# Patient Record
Sex: FEMALE | Race: WHITE | NOT HISPANIC OR LATINO | Employment: OTHER | URBAN - METROPOLITAN AREA
[De-identification: names, ages, dates, MRNs, and addresses within clinical notes are randomized per-mention and may not be internally consistent; named-entity substitution may affect disease eponyms.]

---

## 2017-02-23 ENCOUNTER — TELEPHONE (OUTPATIENT)
Dept: OPHTHALMOLOGY | Facility: CLINIC | Age: 62
End: 2017-02-23

## 2017-02-23 NOTE — TELEPHONE ENCOUNTER
----- Message from Roxanna Stacy sent at 2/23/2017  8:10 AM CST -----  Contact: Patient  Patient states she has conjunctivitis, and wants to know if she can use her contacts and if its time for her to make another appointment, please call her back at [phone]. Thank you

## 2017-03-21 ENCOUNTER — OFFICE VISIT (OUTPATIENT)
Dept: OPHTHALMOLOGY | Facility: CLINIC | Age: 62
End: 2017-03-21
Payer: COMMERCIAL

## 2017-03-21 DIAGNOSIS — H52.03 HYPEROPIA, BILATERAL: ICD-10-CM

## 2017-03-21 DIAGNOSIS — H10.89 GPC (GIANT PAPILLARY CONJUNCTIVITIS): Primary | ICD-10-CM

## 2017-03-21 DIAGNOSIS — Z46.0 ENCOUNTER FOR FITTING OR ADJUSTMENT OF SPECTACLES OR CONTACT LENSES: ICD-10-CM

## 2017-03-21 PROCEDURE — 99999 PR PBB SHADOW E&M-EST. PATIENT-LVL II: CPT | Mod: PBBFAC,,, | Performed by: OPTOMETRIST

## 2017-03-21 PROCEDURE — 92310 CONTACT LENS FITTING OU: CPT | Mod: S$GLB,,, | Performed by: OPTOMETRIST

## 2017-03-21 PROCEDURE — 92015 DETERMINE REFRACTIVE STATE: CPT | Mod: S$GLB,,, | Performed by: OPTOMETRIST

## 2017-03-21 PROCEDURE — 92014 COMPRE OPH EXAM EST PT 1/>: CPT | Mod: S$GLB,,, | Performed by: OPTOMETRIST

## 2017-03-21 RX ORDER — CETIRIZINE HYDROCHLORIDE 10 MG/1
10 TABLET ORAL
COMMUNITY
End: 2023-04-04

## 2017-03-21 RX ORDER — FLUOROMETHOLONE 1 MG/ML
1 SUSPENSION/ DROPS OPHTHALMIC 2 TIMES DAILY PRN
Qty: 10 ML | Refills: 3 | Status: SHIPPED | OUTPATIENT
Start: 2017-03-21 | End: 2017-03-22

## 2017-03-21 NOTE — PROGRESS NOTES
HPI     No visual complaints. Last eye exam 07/11/2014. Update glasses and   contact lenses RX.  No dilation today. Last dilation 02/21/2011. Discuss   fee to update contact lenses.       Last edited by Geneva Damico on 3/21/2017  8:19 AM.         Assessment /Plan     For exam results, see Encounter Report.    GPC (giant papillary conjunctivitis)  -     fluorometholone 0.1% (FML) 0.1 % DrpS; Place 1 drop into both eyes 2 (two) times daily as needed.  Dispense: 10 mL; Refill: 3    Encounter for fitting or adjustment of spectacles or contact lenses    Hyperopia, bilateral      Temporary switch to AV Oasys 1 Day OU  FML bid OU x 10 days then try Air Optix again to see if discomfort returns. May need to extend daily disposables for several months.    Discussed CL charges.  Dispense Final Rx for glasses  Note changes CL Rx  RTC 1 year

## 2017-03-22 RX ORDER — PREDNISOLONE SODIUM PHOSPHATE 10 MG/ML
1 SOLUTION/ DROPS OPHTHALMIC 2 TIMES DAILY
Qty: 10 ML | Refills: 0 | Status: SHIPPED | OUTPATIENT
Start: 2017-03-22 | End: 2017-04-01

## 2017-05-23 DIAGNOSIS — M79.671 RIGHT FOOT PAIN: Primary | ICD-10-CM

## 2017-05-24 ENCOUNTER — HOSPITAL ENCOUNTER (OUTPATIENT)
Dept: RADIOLOGY | Facility: HOSPITAL | Age: 62
Discharge: HOME | End: 2017-05-24
Attending: PODIATRIST
Payer: COMMERCIAL

## 2017-05-24 ENCOUNTER — OFFICE VISIT (OUTPATIENT)
Dept: PODIATRY | Facility: CLINIC | Age: 62
End: 2017-05-24
Payer: COMMERCIAL

## 2017-05-24 VITALS
HEIGHT: 60 IN | DIASTOLIC BLOOD PRESSURE: 70 MMHG | BODY MASS INDEX: 25.79 KG/M2 | HEART RATE: 69 BPM | WEIGHT: 131.38 LBS | SYSTOLIC BLOOD PRESSURE: 106 MMHG

## 2017-05-24 DIAGNOSIS — M19.079 ARTHRITIS, MIDFOOT: ICD-10-CM

## 2017-05-24 DIAGNOSIS — M79.671 RIGHT FOOT PAIN: ICD-10-CM

## 2017-05-24 DIAGNOSIS — S96.911A RIGHT FOOT STRAIN, INITIAL ENCOUNTER: Primary | ICD-10-CM

## 2017-05-24 PROCEDURE — 73630 X-RAY EXAM OF FOOT: CPT | Mod: TC,PO,RT

## 2017-05-24 PROCEDURE — 99214 OFFICE O/P EST MOD 30 MIN: CPT | Mod: S$GLB,,, | Performed by: PODIATRIST

## 2017-05-24 PROCEDURE — 99999 PR PBB SHADOW E&M-EST. PATIENT-LVL III: CPT | Mod: PBBFAC,,, | Performed by: PODIATRIST

## 2017-05-24 PROCEDURE — 1160F RVW MEDS BY RX/DR IN RCRD: CPT | Mod: S$GLB,,, | Performed by: PODIATRIST

## 2017-05-24 PROCEDURE — 73630 X-RAY EXAM OF FOOT: CPT | Mod: 26,RT,, | Performed by: RADIOLOGY

## 2017-05-24 RX ORDER — MELOXICAM 7.5 MG/1
TABLET ORAL
Qty: 30 TABLET | Refills: 0 | Status: SHIPPED | OUTPATIENT
Start: 2017-05-24 | End: 2017-08-24 | Stop reason: ALTCHOICE

## 2017-05-24 NOTE — PROGRESS NOTES
[facility] -   PODIATRIC MEDICINE AND SURGERY  PROGRESS NOTE  5/24/2017    PODIATRY NOTE  PCP: Dr. Humberto Colon MD    CHIEF COMPLAINT   Chief Complaint   Patient presents with    Foot Pain     right top of foot pain radiating to ankle worse when walking, xrays prior,        HPI  Jaylyn May is a 61 y.o. female who has a past medical history of Abnormal Pap smear and Supraventricular tachycardia.     Jaylyn presents to clinic today complaining of right foot pain .    Patient describes pain as:   Location: dorsum of right foot across midfoot   Quality: throbbing, sharp   Severity:6/10  Duration: 1 month   Modifying Factors (Aggravating): daily activities   Modifying Factors (Alleviating): no treatment    Pt states her pain has diminished over past week. Pain sudden onset over past 4 weeks. No trauma. No changes in shoewear. Pain is dull pain diffusely across midfoot which radiates proximally.     Patient denies other pedal complaints at this time.      PMH  Past Medical History:   Diagnosis Date    Abnormal Pap smear     over 15 years     Supraventricular tachycardia        PROBLEM LIST  Patient Active Problem List    Diagnosis Date Noted    Hyperlipidemia 09/20/2016    CFS (chronic fatigue syndrome) 09/16/2016    Anxiety state 05/25/2015    OP (osteoporosis) 05/25/2015    Paroxysmal supraventricular tachycardia 03/10/2015    Seasonal allergies 09/19/2013    Migraine without status migrainosus, not intractable 09/19/2013       MEDS  Current Outpatient Prescriptions on File Prior to Visit   Medication Sig Dispense Refill    aspirin (ECOTRIN) 81 MG EC tablet Take 81 mg by mouth once daily.      cetirizine (ZYRTEC) 10 MG tablet Take 10 mg by mouth.      GARLIC EXTRACT ORAL Take 1 capsule by mouth once daily.      lorazepam (ATIVAN) 1 MG tablet Take 0.5 mg by mouth every 6 (six) hours as needed for Anxiety.       melatonin 10 mg Tab Take 20 mg by mouth every evening.      metoprolol  succinate (TOPROL-XL) 25 MG 24 hr tablet Take 0.5 tablets (12.5 mg total) by mouth once daily. 45 tablet 3    MULTIVIT,THER IRON,CA,FA & MIN (MULTIVITAMIN) Tab Take 1 tablet by mouth once daily.      simvastatin (ZOCOR) 20 MG tablet Take 1 tablet by mouth once daily.      venlafaxine (EFFEXOR-XR) 75 MG 24 hr capsule Take 75 mg by mouth 2 (two) times daily.       pseudoephedrine (SUDAFED) 30 MG tablet Take 30 mg by mouth every 4 (four) hours as needed for Congestion.      rizatriptan (MAXALT) 10 MG tablet Take 10 mg by mouth as needed for Migraine.      [DISCONTINUED] tramadol (ULTRAM) 50 mg tablet Take 1 tablet (50 mg total) by mouth every 6 (six) hours as needed for Pain. 20 tablet 0     No current facility-administered medications on file prior to visit.        Medication List with Changes/Refills   New Medications    MELOXICAM (MOBIC) 7.5 MG TABLET    Take 1 pill by mouth daily for 14 days and then only take as needed for pain   Current Medications    ASPIRIN (ECOTRIN) 81 MG EC TABLET    Take 81 mg by mouth once daily.    CETIRIZINE (ZYRTEC) 10 MG TABLET    Take 10 mg by mouth.    GARLIC EXTRACT ORAL    Take 1 capsule by mouth once daily.    LORAZEPAM (ATIVAN) 1 MG TABLET    Take 0.5 mg by mouth every 6 (six) hours as needed for Anxiety.     MELATONIN 10 MG TAB    Take 20 mg by mouth every evening.    METOPROLOL SUCCINATE (TOPROL-XL) 25 MG 24 HR TABLET    Take 0.5 tablets (12.5 mg total) by mouth once daily.    MULTIVIT,THER IRON,CA,FA & MIN (MULTIVITAMIN) TAB    Take 1 tablet by mouth once daily.    PSEUDOEPHEDRINE (SUDAFED) 30 MG TABLET    Take 30 mg by mouth every 4 (four) hours as needed for Congestion.    RIZATRIPTAN (MAXALT) 10 MG TABLET    Take 10 mg by mouth as needed for Migraine.    SIMVASTATIN (ZOCOR) 20 MG TABLET    Take 1 tablet by mouth once daily.    VENLAFAXINE (EFFEXOR-XR) 75 MG 24 HR CAPSULE    Take 75 mg by mouth 2 (two) times daily.    Discontinued Medications    TRAMADOL (ULTRAM) 50 MG  TABLET    Take 1 tablet (50 mg total) by mouth every 6 (six) hours as needed for Pain.       PSH     Past Surgical History:   Procedure Laterality Date     SECTION      MANDIBLE FRACTURE SURGERY          ALL  Review of patient's allergies indicates:   Allergen Reactions    Penicillins      rash       SOC     Social History   Substance Use Topics    Smoking status: Never Smoker    Smokeless tobacco: Never Used    Alcohol use 4.2 oz/week     7 Glasses of wine per week         FAMILY HX    Family History   Problem Relation Age of Onset    Hypertension Mother     Cataracts Mother     Blindness Maternal Aunt     Hypertension Maternal Aunt     Cataracts Maternal Aunt     Cancer Maternal Grandmother     Leukemia Paternal Aunt             REVIEW OF SYSTEMS  General: Denies any fever or chills  Chest: Denies shortness of breath, wheezing, coughing, or sputum production  Heart: Denies chest pain, cold extremities, orthopenia, or reduced exercise tolerance  As noted above and per history of current illness above, otherwise negative in the remainder of the 14 systems.     PHYSICAL EXAM  Vitals:    17 0801   BP: 106/70   Pulse: 69   Weight: 59.6 kg (131 lb 6.3 oz)   Height: 5' (1.524 m)   PainSc:   6   PainLoc: Foot       General: This patient is well-developed, well-nourished and appears stated age, well-oriented to person, place and time, and cooperative and pleasant on today's visit      LOWER EXTREMITY  Vascular exam:   · Dorsalis pedis and posterior tibial pulses palpable 2/4 bilaterally.   · Capillary refill time immediate to the toes.   · Feet are warm to the touch. Skin temperature warm to warm from proximally to distally   · There are no varicosities, telangiectasias noted to bilateral foot and ankle regions.   · There are no ecchymoses noted to bilateral foot and ankle regions.   · There is no gross lower extremity edema.    Dermatologic exam:   · Skin moist with healthy texture and  turgor.  · There are no open ulcerations, lacerations, or fissures to bilateral foot and ankle regions. There are no signs of infection as there is no erythema, no proximal-extending lymphangiitis, no fluctuance, or crepitus noted on palpation to bilateral foot and ankle regions.   · There is no interdigital maceration.   · There are no hyperkeratotic lesions noted to feet. Nails are well-trimmed.    Neurologic exam:  · Epicritic sensation is intact as the patient is able to sense light touch to bilateral foot and ankle regions.   · Achilles and patellar deep tendon reflexes intact  · Babinski reflex absent  · There is no point tenderness with tuning fork across metatarsals 1-5 right    Musculoskeletal/Orthopedic exam:   · No symptomatic structural abnormalities noted  · Muscle strength AT/EHL/EDL/PT: 5/5; Achilles/Gastroc/Soleus: 5/5; PB/PL: 5/5 Muscle tone is normal.  · Ankle joint ROM  B/L supple DF/PF, non-crepitus  · STJ ROM supple inv/ev, non crepitus   · MTPJ b/l supple DF/PF, non crepitus    IMAGING   Reviewed by me and I agree with radiologist findings, 3 views of foot/ankle, reveal:  Results for orders placed during the hospital encounter of 06/10/14   X-Ray Foot Complete Bilateral    Narrative Findings: There is early degenerative change at both first MTP joints.  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 No periarticular erosions.  No acute fracture or dislocation.    Impression  As above.      Electronically signed by: NAGI GUAMAN MD  Date:     06/10/14  Time:    14:36            Results for orders placed during the hospital encounter of 06/10/14   X-Ray Foot Complete Bilateral    Narrative Findings: There is early degenerative change at both first MTP joints.  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 No periarticular erosions.  No acute fracture or dislocation.    Impression  As above.      Electronically signed by: NAGI GUAMAN  MD  Date:     06/10/14  Time:    14:36          ASSESSMENT  Right foot strain, initial encounter    Arthritis, midfoot    Other orders  -     meloxicam (MOBIC) 7.5 MG tablet; Take 1 pill by mouth daily for 14 days and then only take as needed for pain  Dispense: 30 tablet; Refill: 0        PLAN    1. Patient was educated about clinical and imaging findings, and verbalizes understanding of above.  2. Treatment plan: clinically foot symptoms have improved; pt should continue in corrective inserts and new balance tennis shoes; Rx mobic/PRICE therapy as needed if symptoms return   3. RTC  for follow up/evaluation as scheduled       Future Appointments  Date Time Provider Department Center   6/23/2017 3:00 PM Car Danielle MD [facility]       Report Electronically Signed By:  Geraldine Welch DPM   Podiatric Medicine & Surgery  [facility]  5/24/2017

## 2017-06-06 ENCOUNTER — CLINICAL SUPPORT (OUTPATIENT)
Dept: INTERNAL MEDICINE | Facility: CLINIC | Age: 62
End: 2017-06-06
Payer: COMMERCIAL

## 2017-06-06 DIAGNOSIS — Z23 NEED FOR ZOSTER VACCINE: Primary | ICD-10-CM

## 2017-06-06 PROCEDURE — 90471 IMMUNIZATION ADMIN: CPT | Mod: S$GLB,,, | Performed by: FAMILY MEDICINE

## 2017-06-06 PROCEDURE — 99999 PR PBB SHADOW E&M-EST. PATIENT-LVL II: CPT | Mod: PBBFAC,,,

## 2017-06-06 PROCEDURE — 90736 HZV VACCINE LIVE SUBQ: CPT | Mod: S$GLB,,, | Performed by: FAMILY MEDICINE

## 2017-07-06 ENCOUNTER — OFFICE VISIT (OUTPATIENT)
Dept: CARDIOLOGY | Facility: CLINIC | Age: 62
End: 2017-07-06
Payer: COMMERCIAL

## 2017-07-06 VITALS
HEIGHT: 60 IN | HEART RATE: 72 BPM | BODY MASS INDEX: 26.1 KG/M2 | DIASTOLIC BLOOD PRESSURE: 64 MMHG | SYSTOLIC BLOOD PRESSURE: 102 MMHG | WEIGHT: 132.94 LBS

## 2017-07-06 DIAGNOSIS — I47.10 PAROXYSMAL SUPRAVENTRICULAR TACHYCARDIA: Primary | ICD-10-CM

## 2017-07-06 DIAGNOSIS — E78.00 PURE HYPERCHOLESTEROLEMIA: ICD-10-CM

## 2017-07-06 PROCEDURE — 99214 OFFICE O/P EST MOD 30 MIN: CPT | Mod: S$GLB,,, | Performed by: NUCLEAR MEDICINE

## 2017-07-06 PROCEDURE — 99999 PR PBB SHADOW E&M-EST. PATIENT-LVL III: CPT | Mod: PBBFAC,,, | Performed by: NUCLEAR MEDICINE

## 2017-07-06 RX ORDER — DIPHENHYDRAMINE HCL 50 MG
50 CAPSULE ORAL
COMMUNITY
End: 2018-07-12

## 2017-07-06 NOTE — PROGRESS NOTES
Subjective:   Patient ID:  Jaylyn May is a 61 y.o. female who presents for follow-up of Tachycardia and Palpitations      HPI 1- PALPITATIONS- PSVT   2- DYSLIPIDEMIA  DOING WELL. HAS HAD ONLY  2-3 EPISODES, OF PALPITATIONS SINCE LAST VISIT. LASTING LESS THAN ONE MINUTE, RELATED TO EMOTIONAL STRESS. NO DURING PHYSICAL ACTIVITIES. CONTROLLED WITH VAGAL MANEUVERS.  NO HX OF NEAR SYNCOPE OR SYNCOPE  NO HX OF ANY TYPE OF CHEST DISCOMFORT. NO UNUSUAL CASTELLON. NO ORTHOPNEA OR PND  NO FOCAL CNS SYMPTOMS OR SIGNS TO SUGGEST TIA OR STROKE  NO EDEMA. NO INTERMITTENT CLAUDICATION  CARD MED WELL TOLERATED    Review of Systems   Constitution: Negative for chills, fever, weakness, night sweats, weight gain and weight loss.   HENT: Negative for headaches and nosebleeds.    Eyes: Negative for blurred vision, double vision and visual disturbance.   Cardiovascular: Negative for chest pain, dyspnea on exertion, irregular heartbeat, leg swelling, orthopnea, palpitations, paroxysmal nocturnal dyspnea and syncope.   Respiratory: Negative for cough, hemoptysis and wheezing.    Endocrine: Negative for polydipsia and polyuria.   Hematologic/Lymphatic: Does not bruise/bleed easily.   Skin: Negative for rash.   Musculoskeletal: Negative for joint pain, joint swelling, muscle weakness and myalgias.   Gastrointestinal: Negative for abdominal pain, hematemesis, jaundice and melena.   Genitourinary: Negative for dysuria, hematuria and nocturia.   Neurological: Negative for dizziness, focal weakness and sensory change.   Psychiatric/Behavioral: Negative for depression. The patient does not have insomnia and is not nervous/anxious.      Family History   Problem Relation Age of Onset    Hypertension Mother     Cataracts Mother     Blindness Maternal Aunt     Hypertension Maternal Aunt     Cataracts Maternal Aunt     Cancer Maternal Grandmother     Leukemia Paternal Aunt      Past Medical History:   Diagnosis Date    Abnormal Pap smear      over 15 years     Supraventricular tachycardia      Current Outpatient Prescriptions on File Prior to Visit   Medication Sig Dispense Refill    aspirin (ECOTRIN) 81 MG EC tablet Take 81 mg by mouth once daily.      GARLIC EXTRACT ORAL Take 1 capsule by mouth once daily.      melatonin 10 mg Tab Take 20 mg by mouth every evening.      metoprolol succinate (TOPROL-XL) 25 MG 24 hr tablet Take 0.5 tablets (12.5 mg total) by mouth once daily. 45 tablet 3    MULTIVIT,THER IRON,CA,FA & MIN (MULTIVITAMIN) Tab Take 1 tablet by mouth once daily.      simvastatin (ZOCOR) 20 MG tablet Take 1 tablet by mouth once daily.      venlafaxine (EFFEXOR-XR) 75 MG 24 hr capsule Take 75 mg by mouth 2 (two) times daily.       cetirizine (ZYRTEC) 10 MG tablet Take 10 mg by mouth.      lorazepam (ATIVAN) 1 MG tablet Take 0.5 mg by mouth every 6 (six) hours as needed for Anxiety.       meloxicam (MOBIC) 7.5 MG tablet Take 1 pill by mouth daily for 14 days and then only take as needed for pain 30 tablet 0    pseudoephedrine (SUDAFED) 30 MG tablet Take 30 mg by mouth every 4 (four) hours as needed for Congestion.      rizatriptan (MAXALT) 10 MG tablet Take 10 mg by mouth as needed for Migraine.       No current facility-administered medications on file prior to visit.      Review of patient's allergies indicates:   Allergen Reactions    Penicillins      rash       Objective:     Physical Exam   Constitutional: She is oriented to person, place, and time. She appears well-developed. No distress.   HENT:   Head: Normocephalic.   Eyes: Conjunctivae are normal. Pupils are equal, round, and reactive to light. No scleral icterus.   Neck: Normal range of motion. Neck supple. Normal carotid pulses, no hepatojugular reflux and no JVD present. Carotid bruit is not present. No edema present. No thyroid mass and no thyromegaly present.   Cardiovascular: Normal rate, regular rhythm, S1 normal, S2 normal, normal heart sounds and intact distal  pulses.  PMI is not displaced.  Exam reveals no gallop and no friction rub.    No murmur heard.  Pulses:       Carotid pulses are 2+ on the right side, and 2+ on the left side.       Radial pulses are 2+ on the right side, and 2+ on the left side.        Femoral pulses are 2+ on the right side, and 2+ on the left side.       Popliteal pulses are 2+ on the right side, and 2+ on the left side.        Dorsalis pedis pulses are 2+ on the right side, and 2+ on the left side.        Posterior tibial pulses are 2+ on the right side, and 2+ on the left side.   Pulmonary/Chest: Effort normal and breath sounds normal. She has no wheezes. She has no rales. She exhibits no tenderness.   Abdominal: Soft. Bowel sounds are normal. She exhibits no pulsatile midline mass and no mass. There is no hepatosplenomegaly. There is no tenderness.   Musculoskeletal: Normal range of motion. She exhibits no edema or tenderness.        Cervical back: Normal.        Thoracic back: Normal.        Lumbar back: Normal.   Lymphadenopathy:     She has no cervical adenopathy.     She has no axillary adenopathy.        Right: No supraclavicular adenopathy present.        Left: No supraclavicular adenopathy present.   Neurological: She is alert and oriented to person, place, and time. She has normal strength and normal reflexes. No sensory deficit. Gait normal.   Skin: Skin is warm. No rash noted. No cyanosis. No pallor. Nails show no clubbing.   Psychiatric: She has a normal mood and affect. Her speech is normal and behavior is normal. Cognition and memory are normal.       Assessment:     1. Paroxysmal supraventricular tachycardia    2. Pure hypercholesterolemia      CARD ARRHYTHMIAS WELL CONTROLLED  NO CLINICAL EVIDENCE OF ACTIVE MYOCARDIAL ISCHEMIA. NO ADHF  CNS STATUS STABLE  BP STABLE  CARD MED WELL TOLERATED  Plan:     Paroxysmal supraventricular tachycardia    Pure hypercholesterolemia    1- CONTINUE PRESENT CARD MANAGEMENT    2- RETURN IN ONE  YEAR

## 2017-07-24 ENCOUNTER — NURSE TRIAGE (OUTPATIENT)
Dept: ADMINISTRATIVE | Facility: CLINIC | Age: 62
End: 2017-07-24

## 2017-07-25 NOTE — TELEPHONE ENCOUNTER
Reason for Disposition   [1] Heart beating very rapidly (e.g., > 140 / minute) AND [2] not present now  (Exception: during exercise)    Answer Assessment - Initial Assessment Questions  Pt reports hx of SVT's. Tonight had sudden onset where she stated she could hear her heart and racing at 200. She was told she could take an extra 1/2 of a 25 mg metoprolol xl which she did. About 20 min later she decided to go to ER when sx's resolved. HR now < 100 and reported she feels fine. She is concerned about how long she should wait before going to ER if it occurs again and if there is anything else needed this pm.    Protocols used: ST HEART RATE AND HEARTBEAT [id number]

## 2017-07-25 NOTE — TELEPHONE ENCOUNTER
Caller: pt (Today,  9:29 AM)             She's calling in regards to a missed call, pt states she is doing fine today the issues from last night has been resolved, and thanks for calling,  [phone] ([phone])

## 2017-08-24 ENCOUNTER — OFFICE VISIT (OUTPATIENT)
Dept: INTERNAL MEDICINE | Facility: CLINIC | Age: 62
End: 2017-08-24
Payer: COMMERCIAL

## 2017-08-24 VITALS
BODY MASS INDEX: 25.84 KG/M2 | SYSTOLIC BLOOD PRESSURE: 116 MMHG | HEART RATE: 68 BPM | DIASTOLIC BLOOD PRESSURE: 64 MMHG | RESPIRATION RATE: 16 BRPM | OXYGEN SATURATION: 97 % | TEMPERATURE: 98 F | HEIGHT: 60 IN | WEIGHT: 131.63 LBS

## 2017-08-24 DIAGNOSIS — R05.3 COUGH, PERSISTENT: ICD-10-CM

## 2017-08-24 PROCEDURE — 3008F BODY MASS INDEX DOCD: CPT | Mod: S$GLB,,, | Performed by: PHYSICIAN ASSISTANT

## 2017-08-24 PROCEDURE — 99999 PR PBB SHADOW E&M-EST. PATIENT-LVL IV: CPT | Mod: PBBFAC,,, | Performed by: PHYSICIAN ASSISTANT

## 2017-08-24 PROCEDURE — 96372 THER/PROPH/DIAG INJ SC/IM: CPT | Mod: S$GLB,,, | Performed by: PHYSICIAN ASSISTANT

## 2017-08-24 PROCEDURE — 99213 OFFICE O/P EST LOW 20 MIN: CPT | Mod: 25,S$GLB,, | Performed by: PHYSICIAN ASSISTANT

## 2017-08-24 RX ORDER — BENZONATATE 200 MG/1
CAPSULE ORAL
Qty: 30 CAPSULE | Refills: 1 | Status: SHIPPED | OUTPATIENT
Start: 2017-08-24 | End: 2018-07-12

## 2017-08-24 RX ORDER — METHYLPREDNISOLONE ACETATE 80 MG/ML
80 INJECTION, SUSPENSION INTRA-ARTICULAR; INTRALESIONAL; INTRAMUSCULAR; SOFT TISSUE ONCE
Status: COMPLETED | OUTPATIENT
Start: 2017-08-24 | End: 2017-08-24

## 2017-08-24 RX ADMIN — METHYLPREDNISOLONE ACETATE 80 MG: 80 INJECTION, SUSPENSION INTRA-ARTICULAR; INTRALESIONAL; INTRAMUSCULAR; SOFT TISSUE at 04:08

## 2017-08-24 NOTE — PROGRESS NOTES
Subjective:       Patient ID: Jaylyn May is a 61 y.o.W/ female.    Chief Complaint: Cough    HPI         She comes in today by herself and has the above problem.  She started with a dry hacking cough about 5 days ago and it seems to be getting progressively worse.  It almost sounds a little on the croupy side like a seal barking.  She really has not been taking any OTC medicines.  She did take a few little cough drops.  She's had no problem with nose congestion, rhinorrhea, earache, fever, chills, rigors, sweats, diaphoresis, dyspnea of any modality, night sweats, CP, pleurisy or near syncope.  Her appetite is remain normal.    Review of Systems    Otherwise negative concerning the ENT, RESPIRATORY, PULMONARY, and CV system review.    Objective:      Physical Exam    ENT: All essentially WNL.  There is no redness inside the ears or the nose.  The pharynx and soft palate are normal in color without any swelling or exudates.  She has no halitosis.  Submandibular glands are nontender and there is no lymphadenopathy present.  CHEST: Clear BS anterior to posterior.  There is no wheeze, rhonchi, or rales.  She did have a rather disturbing dry sounding [place] cough here in the office that was somewhat hard to control.    Assessment:       1. Cough, persistent        Plan:     1.  She needs to go on Mucinex  mg every 12 hours to help loosen and promote drainage from her chest.  She also needs to take in 4 ounces of liquid at least 4 times a day to help the Mucinex do its job better.  2.  Also gave her Tessalon Perles to take daily at bedtime to suppress cough is necessary.  3.  A Depo-Medrol 80 mg IM injection was offered and she accepted that today.  She really does not need an antibiotic at this time unless he starts developing.  The mucus or fever.  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

## 2017-09-01 ENCOUNTER — TELEPHONE (OUTPATIENT)
Dept: INTERNAL MEDICINE | Facility: CLINIC | Age: 62
End: 2017-09-01

## 2017-09-01 NOTE — TELEPHONE ENCOUNTER
Pt c/o persistent productive cough, HA, fatigue, and chest discomfort from coughing. Pt denies fever, denies sore throat. Pt is requesting different prescription for cough syrup, stating syrup with codeine is NOT addressing symptoms.

## 2017-09-01 NOTE — TELEPHONE ENCOUNTER
Per Martin [place], [place] - pt has exhibited productive cough x 2 wks, exhibited failure on tessalon pearls and Cheratussin, recommends Tussionex syrup for effective cough suppression.

## 2017-10-23 DIAGNOSIS — I47.10 PSVT (PAROXYSMAL SUPRAVENTRICULAR TACHYCARDIA): ICD-10-CM

## 2017-10-23 RX ORDER — METOPROLOL SUCCINATE 25 MG/1
12.5 TABLET, EXTENDED RELEASE ORAL DAILY
Qty: 45 TABLET | Refills: 3 | Status: SHIPPED | OUTPATIENT
Start: 2017-10-23 | End: 2018-10-07 | Stop reason: SDUPTHER

## 2018-03-02 ENCOUNTER — HOSPITAL ENCOUNTER (OUTPATIENT)
Dept: RADIOLOGY | Facility: HOSPITAL | Age: 63
Discharge: HOME | End: 2018-03-02
Attending: FAMILY MEDICINE
Payer: COMMERCIAL

## 2018-03-02 VITALS — BODY MASS INDEX: 25.72 KG/M2 | HEIGHT: 60 IN | WEIGHT: 131 LBS

## 2018-03-02 DIAGNOSIS — Z12.31 VISIT FOR SCREENING MAMMOGRAM: ICD-10-CM

## 2018-03-02 PROCEDURE — 77063 BREAST TOMOSYNTHESIS BI: CPT | Mod: 26,,, | Performed by: RADIOLOGY

## 2018-03-02 PROCEDURE — 77067 SCR MAMMO BI INCL CAD: CPT | Mod: TC,PO

## 2018-03-02 PROCEDURE — 77067 SCR MAMMO BI INCL CAD: CPT | Mod: 26,,, | Performed by: RADIOLOGY

## 2018-03-06 ENCOUNTER — APPOINTMENT (OUTPATIENT)
Dept: RADIOLOGY | Facility: CLINIC | Age: 63
End: 2018-03-06
Attending: FAMILY MEDICINE
Payer: COMMERCIAL

## 2018-03-06 DIAGNOSIS — Z13.820 ENCOUNTER FOR SCREENING FOR OSTEOPOROSIS: ICD-10-CM

## 2018-03-06 DIAGNOSIS — Z12.31 VISIT FOR SCREENING MAMMOGRAM: ICD-10-CM

## 2018-03-06 PROCEDURE — 77080 DXA BONE DENSITY AXIAL: CPT | Mod: 26,,, | Performed by: RADIOLOGY

## 2018-03-06 PROCEDURE — 77080 DXA BONE DENSITY AXIAL: CPT | Mod: TC,PO

## 2018-03-07 ENCOUNTER — OFFICE VISIT (OUTPATIENT)
Dept: OBSTETRICS AND GYNECOLOGY | Facility: CLINIC | Age: 63
End: 2018-03-07
Payer: COMMERCIAL

## 2018-03-07 VITALS
DIASTOLIC BLOOD PRESSURE: 60 MMHG | SYSTOLIC BLOOD PRESSURE: 113 MMHG | HEIGHT: 60 IN | BODY MASS INDEX: 25.99 KG/M2 | WEIGHT: 132.38 LBS

## 2018-03-07 DIAGNOSIS — Z01.419 ENCOUNTER FOR GYNECOLOGICAL EXAMINATION WITHOUT ABNORMAL FINDING: Primary | ICD-10-CM

## 2018-03-07 PROCEDURE — 99999 PR PBB SHADOW E&M-EST. PATIENT-LVL IV: CPT | Mod: PBBFAC,,, | Performed by: NURSE PRACTITIONER

## 2018-03-07 PROCEDURE — 99386 PREV VISIT NEW AGE 40-64: CPT | Mod: SA,S$GLB,, | Performed by: NURSE PRACTITIONER

## 2018-03-07 PROCEDURE — 88175 CYTOPATH C/V AUTO FLUID REDO: CPT

## 2018-03-07 RX ORDER — IBANDRONATE SODIUM 150 MG/1
150 TABLET, FILM COATED ORAL
COMMUNITY
Start: 2013-08-07 | End: 2018-03-07

## 2018-03-07 NOTE — PROGRESS NOTES
CC: Well woman exam    Jaylyn May is a 62 y.o. female  presents for well woman exam.  LMP: No LMP recorded. Patient is postmenopausal..  No issues, problems, or complaints.      Past Medical History:   Diagnosis Date    Abnormal Pap smear     over 15 years     Supraventricular tachycardia      Past Surgical History:   Procedure Laterality Date     SECTION      MANDIBLE FRACTURE SURGERY       Social History     Social History    Marital status:      Spouse name: N/A    Number of children: N/A    Years of education: N/A     Occupational History    Not on file.     Social History Main Topics    Smoking status: Never Smoker    Smokeless tobacco: Never Used    Alcohol use 4.2 oz/week     7 Glasses of wine per week      Comment: socally     Drug use: No    Sexual activity: Yes     Partners: Male     Birth control/ protection: None     Other Topics Concern    Not on file     Social History Narrative    No narrative on file     Family History   Problem Relation Age of Onset    Hypertension Mother     Cataracts Mother     Blindness Maternal Aunt     Hypertension Maternal Aunt     Cataracts Maternal Aunt     Breast cancer Sister     Cancer Maternal Grandmother     Breast cancer Maternal Grandmother     Leukemia Paternal Aunt      OB History      Para Term  AB Living    2         2    SAB TAB Ectopic Multiple Live Births                       BP (!) 113/59   Ht 5' (1.524 m)   Wt 60.1 kg (132 lb 6.2 oz)   BMI 25.85 kg/m²       ROS:  GENERAL: Denies weight gain or weight loss. Feeling well overall.   SKIN: Denies rash or lesions.   HEAD: Denies head injury or headache.   NODES: Denies enlarged lymph nodes.   CHEST: Denies chest pain or shortness of breath.   CARDIOVASCULAR: Denies palpitations or left sided chest pain.   ABDOMEN: No abdominal pain, constipation, diarrhea, nausea, vomiting or rectal bleeding.   URINARY: No frequency, dysuria, hematuria, or burning  on urination.  REPRODUCTIVE: See HPI.   BREASTS: The patient performs breast self-examination and denies pain, lumps, or nipple discharge.   HEMATOLOGIC: No easy bruisability or excessive bleeding.   MUSCULOSKELETAL: Denies joint pain or swelling.   NEUROLOGIC: Denies syncope or weakness.   PSYCHIATRIC: Denies depression, anxiety or mood swings.    PHYSICAL EXAM:  APPEARANCE: Well nourished, well developed, in no acute distress.  AFFECT: WNL, alert and oriented x 3  SKIN: No acne or hirsutism  NECK: Neck symmetric without masses or thyromegaly  NODES: No inguinal, cervical, axillary, or femoral lymph node enlargement  CHEST: Good respiratory effect  ABDOMEN: Soft.  No tenderness or masses.  No hepatosplenomegaly.  No hernias.  BREASTS: Symmetrical, no skin changes or visible lesions.  No palpable masses, nipple discharge bilaterally.  PELVIC: Normal external genitalia without lesions.  Normal hair distribution.  Adequate perineal body, normal urethral meatus.  Vagina atrophic without lesions or discharge.  Cervix pink but stenotic, without lesions, discharge or tenderness.  No significant cystocele or rectocele.  Bimanual exam shows uterus to be normal size, regular, mobile and nontender.  Adnexa without masses or tenderness.    EXTREMITIES: No edema.  Physical Exam    1. Encounter for gynecological examination without abnormal finding  Liquid-based pap smear, screening    AND PLAN:    Patient was counseled today on A.C.S. Pap guidelines and recommendations for yearly pelvic exams, mammograms and monthly self breast exams; to see her PCP for other health maintenance.

## 2018-06-25 ENCOUNTER — TELEPHONE (OUTPATIENT)
Dept: PEDIATRICS | Facility: CLINIC | Age: 63
End: 2018-06-25

## 2018-07-03 ENCOUNTER — OFFICE VISIT (OUTPATIENT)
Dept: ENDOCRINOLOGY | Facility: CLINIC | Age: 63
End: 2018-07-03
Payer: COMMERCIAL

## 2018-07-03 ENCOUNTER — TELEPHONE (OUTPATIENT)
Dept: ENDOCRINOLOGY | Facility: CLINIC | Age: 63
End: 2018-07-03

## 2018-07-03 VITALS
SYSTOLIC BLOOD PRESSURE: 114 MMHG | HEIGHT: 60 IN | BODY MASS INDEX: 25.97 KG/M2 | WEIGHT: 132.25 LBS | HEART RATE: 76 BPM | DIASTOLIC BLOOD PRESSURE: 62 MMHG | TEMPERATURE: 98 F

## 2018-07-03 DIAGNOSIS — D35.2 PITUITARY MACROADENOMA WITH EXTRASELLAR EXTENSION: ICD-10-CM

## 2018-07-03 DIAGNOSIS — R93.0 MASS IN REGION OF SELLA TURCICA PRESENT ON MAGNETIC RESONANCE IMAGING: Primary | ICD-10-CM

## 2018-07-03 PROCEDURE — 99999 PR PBB SHADOW E&M-EST. PATIENT-LVL III: CPT | Mod: PBBFAC,,, | Performed by: INTERNAL MEDICINE

## 2018-07-03 PROCEDURE — 99244 OFF/OP CNSLTJ NEW/EST MOD 40: CPT | Mod: S$GLB,,, | Performed by: INTERNAL MEDICINE

## 2018-07-03 NOTE — PROGRESS NOTES
""This note will be shared with the patient"Subjective:       Patient ID: Jaylyn May is a 62 y.o. female.  Patient is new to me    Records were reviewed      Chief Complaint: Abnormal Lab (mass in sella turcica, mildly elevated prolactin)      Consultation requested by Dr. MELINDA Canseco Jr.    HPI  Patient was referred for evaluation for mass in the sella turcica as well as elevated prolactin level.  She was seen in the emergency room on June 15th after onset of excruciating headache.  She had a CT scan of her head done at [facility] on Petra 15, 2018 that showed an abnormality in the pituitary area and this revealed a 7 mm hyperdense mass in the expected area of the pituitary and she was told to follow up with her PCP for an MRI which she did have at the same hospital on June 21, 2018 a sellar/suprasellar mass that may relate to a proteinaceous Rathke's cleft cyst or potentially pituitary macroadenoma.  Cystic craniopharyngioma is also possible and  it mentioned that the lesion demonstrated mild mass effect on the optic chiasm.  It was hyperintense on T1 weighted imaging.  And it measures 7.2 x 1.48 x 0.97 mm    Patient brought a CD that had both the CT and MRI which I did review the images as well as the report of both tests.  She had been referred to a neurosurgeon at the [facility] Dr. Collins who has referred her to Ophthalmology for visual field testing and who has not recommended surgery but observation at this time pending endocrine evaluation    Patient does report several year history of migraine headaches, she denies double vision or blurry vision    I have reviewed the past medical, family and social history    Review of Systems   Constitutional: Negative for appetite change, fatigue, fever and unexpected weight change.   HENT: Negative for sore throat and trouble swallowing.    Eyes: Negative for visual disturbance.   Respiratory: Negative for shortness of breath and wheezing.  "   Cardiovascular: Negative for chest pain, palpitations and leg swelling.   Gastrointestinal: Negative for diarrhea, nausea and vomiting.   Endocrine: Negative for cold intolerance, heat intolerance, polydipsia, polyphagia and polyuria.   Genitourinary: Negative for difficulty urinating, dysuria and menstrual problem.   Musculoskeletal: Negative for arthralgias and joint swelling.   Skin: Negative for rash.   Neurological: Positive for headaches. Negative for dizziness, weakness and numbness.   Psychiatric/Behavioral: Negative for confusion, dysphoric mood and sleep disturbance.       Objective:      Physical Exam   Constitutional: She is oriented to person, place, and time. She appears well-developed and well-nourished. No distress.   HENT:   Head: Normocephalic and atraumatic.   Right Ear: External ear normal.   Left Ear: External ear normal.   Nose: Nose normal.   Mouth/Throat: Oropharynx is clear and moist. No oropharyngeal exudate.   Eyes: Conjunctivae and EOM are normal. Pupils are equal, round, and reactive to light. No scleral icterus.   Neck: No JVD present. No tracheal deviation present. No thyromegaly present.   Cardiovascular: Normal rate, regular rhythm, normal heart sounds and intact distal pulses.  Exam reveals no gallop and no friction rub.    No murmur heard.  Pulmonary/Chest: Effort normal and breath sounds normal. No respiratory distress. She has no wheezes. She has no rales.   Abdominal: Soft. Bowel sounds are normal. She exhibits no distension and no mass. There is no tenderness. There is no rebound and no guarding. No hernia.   Musculoskeletal: She exhibits no edema or deformity.   Lymphadenopathy:     She has no cervical adenopathy.   Neurological: She is alert and oriented to person, place, and time. She has normal reflexes. No cranial nerve deficit.   Skin: Skin is warm. No rash noted. No erythema.   Psychiatric: She has a normal mood and affect. Her behavior is normal.   Vitals reviewed.         Lab Review:   No visits with results within 6 Month(s) from this visit.   Latest known visit with results is:   Clinical Support on 03/10/2015   Component Date Value    EF 03/10/2015 60     Diastolic Dysfunction 03/10/2015 No     Aortic Valve Regurgitati* 03/10/2015 MILD     Est. PA Systolic Pressure 03/10/2015 27.21     Tricuspid Valve Regurgit* 03/10/2015 TRIVIAL TO MILD        June 27, 2018 prolactin 24.8 (H)  Assessment:     1. Mass in region of sella turcica present on magnetic resonance imaging  ACTH    Insulin-like growth factor    Alpha-Subunit Pituitary Tumor Marker    Growth hormone    T4, free    T3, free    TSH    Follicle stimulating hormone    Luteinizing hormone    Cortisol, 8AM    Cortisol, urine, free    Creatinine, urine, timed 24 Hours    Basic metabolic panel    CANCELED: TSH   2. Pituitary macroadenoma with extrasellar extension  ACTH    Insulin-like growth factor    Alpha-Subunit Pituitary Tumor Marker    Growth hormone    T4, free    T3, free    TSH    Follicle stimulating hormone    Luteinizing hormone    Cortisol, 8AM    Cortisol, urine, free    Creatinine, urine, timed 24 Hours    Basic metabolic panel    CANCELED: TSH        Patient has already seen the neurosurgeon at [facility] and has been referred to Ophthalmology for visual field testing and I will check pituitary hormonal panel with fasting blood work as written below and screen for Cushing's also with 24 urine cortisol and if these tests are  normal as neurosurgeon has stated to patient, observation would be warranted as the prolactin since it was only mildly elevated likely is from stalk compression  Plan:   Mass in region of sella turcica present on magnetic resonance imaging  -     ACTH; Future; Expected date: 07/03/2018  -     Insulin-like growth factor; Future; Expected date: 07/03/2018  -     Alpha-Subunit Pituitary Tumor Marker; Future; Expected date: 07/03/2018  -     Growth hormone; Future; Expected date:  07/03/2018  -     T4, free; Future; Expected date: 07/03/2018  -     T3, free; Future; Expected date: 07/03/2018  -     TSH; Future; Expected date: 07/03/2018  -     Follicle stimulating hormone; Future; Expected date: 07/03/2018  -     Luteinizing hormone; Future; Expected date: 07/03/2018  -     Cancel: TSH; Future; Expected date: 07/03/2018  -     Cortisol, 8AM; Future; Expected date: 07/03/2018  -     Cortisol, urine, free; Future  -     Creatinine, urine, timed 24 Hours; Future  -     Basic metabolic panel; Future; Expected date: 07/03/2018    Pituitary macroadenoma with extrasellar extension  -     ACTH; Future; Expected date: 07/03/2018  -     Insulin-like growth factor; Future; Expected date: 07/03/2018  -     Alpha-Subunit Pituitary Tumor Marker; Future; Expected date: 07/03/2018  -     Growth hormone; Future; Expected date: 07/03/2018  -     T4, free; Future; Expected date: 07/03/2018  -     T3, free; Future; Expected date: 07/03/2018  -     TSH; Future; Expected date: 07/03/2018  -     Follicle stimulating hormone; Future; Expected date: 07/03/2018  -     Luteinizing hormone; Future; Expected date: 07/03/2018  -     Cancel: TSH; Future; Expected date: 07/03/2018  -     Cortisol, 8AM; Future; Expected date: 07/03/2018  -     Cortisol, urine, free; Future  -     Creatinine, urine, timed 24 Hours; Future  -     Basic metabolic panel; Future; Expected date: 07/03/2018          No Follow-up on file.

## 2018-07-03 NOTE — LETTER
July 9, 2018      MELINDA Canseco Jr., MD  [address]  [place] [address]           [facility] - Endocrinology  [address]  [address]  [place] [address]  Phone: [phone]  Fax: [phone]          Patient: Jaylyn May   MR Number: [id number]   YOB: 1955   Date of Visit: 7/3/2018       Dear Dr. MELINDA Canseco Jr.:    Thank you for referring Jaylyn May to me for evaluation. Attached you will find relevant portions of my assessment and plan of care.    If you have questions, please do not hesitate to call me. I look forward to following Jaylyn May along with you.    Sincerely,    China Pat  CC:  No Recipients    If you would like to receive this communication electronically, please contact [email or web address] or [phone] to request more information on [facility] access.    For providers and/or their staff who would like to refer a patient to [facility], please contact us through our one-stop-shop provider referral line, [facility], at [phone].    If you feel you have received this communication in error or would no longer like to receive these types of communications, please e-mail [email or web address]

## 2018-07-03 NOTE — TELEPHONE ENCOUNTER
----- Message from Denia Wisdom sent at 7/3/2018 10:21 AM CDT -----  Contact: pt   Pt states that she need to talk to nurse regarding leaving her MRI and doctors notes at home cause she didn't think she would get a appt today.   [phone]

## 2018-07-09 ENCOUNTER — LAB VISIT (OUTPATIENT)
Dept: LAB | Facility: HOSPITAL | Age: 63
End: 2018-07-09
Attending: FAMILY MEDICINE
Payer: COMMERCIAL

## 2018-07-09 DIAGNOSIS — D35.2 PITUITARY MACROADENOMA WITH EXTRASELLAR EXTENSION: ICD-10-CM

## 2018-07-09 DIAGNOSIS — R93.0 MASS IN REGION OF SELLA TURCICA PRESENT ON MAGNETIC RESONANCE IMAGING: ICD-10-CM

## 2018-07-09 LAB
ANION GAP SERPL CALC-SCNC: 10 MMOL/L
BUN SERPL-MCNC: 14 MG/DL
CALCIUM SERPL-MCNC: 10 MG/DL
CHLORIDE SERPL-SCNC: 105 MMOL/L
CO2 SERPL-SCNC: 26 MMOL/L
CORTIS SERPL-MCNC: 11.4 UG/DL
CREAT SERPL-MCNC: 0.8 MG/DL
EST. GFR  (AFRICAN AMERICAN): >60 ML/MIN/1.73 M^2
EST. GFR  (NON AFRICAN AMERICAN): >60 ML/MIN/1.73 M^2
FSH SERPL-ACNC: 57.7 MIU/ML
GLUCOSE SERPL-MCNC: 63 MG/DL
LH SERPL-ACNC: 26.3 MIU/ML
POTASSIUM SERPL-SCNC: 4.3 MMOL/L
SODIUM SERPL-SCNC: 141 MMOL/L
T3FREE SERPL-MCNC: 2.9 PG/ML
T4 FREE SERPL-MCNC: 1.02 NG/DL
TSH SERPL DL<=0.005 MIU/L-ACNC: 1.5 UIU/ML

## 2018-07-09 PROCEDURE — 84443 ASSAY THYROID STIM HORMONE: CPT

## 2018-07-09 PROCEDURE — 82533 TOTAL CORTISOL: CPT

## 2018-07-09 PROCEDURE — 84481 FREE ASSAY (FT-3): CPT

## 2018-07-09 PROCEDURE — 84439 ASSAY OF FREE THYROXINE: CPT

## 2018-07-09 PROCEDURE — 83520 IMMUNOASSAY QUANT NOS NONAB: CPT

## 2018-07-09 PROCEDURE — 83003 ASSAY GROWTH HORMONE (HGH): CPT

## 2018-07-09 PROCEDURE — 82024 ASSAY OF ACTH: CPT

## 2018-07-09 PROCEDURE — 84305 ASSAY OF SOMATOMEDIN: CPT

## 2018-07-09 PROCEDURE — 83002 ASSAY OF GONADOTROPIN (LH): CPT

## 2018-07-09 PROCEDURE — 36415 COLL VENOUS BLD VENIPUNCTURE: CPT | Mod: PO

## 2018-07-09 PROCEDURE — 83001 ASSAY OF GONADOTROPIN (FSH): CPT

## 2018-07-09 PROCEDURE — 80048 BASIC METABOLIC PNL TOTAL CA: CPT

## 2018-07-10 LAB
ACTH PLAS-MCNC: 27 PG/ML
GH SERPL-MCNC: 6.8 NG/ML

## 2018-07-11 LAB — A-PGH SER-MCNC: 0.6 NG/ML

## 2018-07-12 ENCOUNTER — OFFICE VISIT (OUTPATIENT)
Dept: OPHTHALMOLOGY | Facility: CLINIC | Age: 63
End: 2018-07-12
Payer: COMMERCIAL

## 2018-07-12 DIAGNOSIS — H52.4 HYPEROPIA WITH PRESBYOPIA OF BOTH EYES: ICD-10-CM

## 2018-07-12 DIAGNOSIS — H52.03 HYPEROPIA WITH PRESBYOPIA OF BOTH EYES: ICD-10-CM

## 2018-07-12 DIAGNOSIS — D49.7 PITUITARY TUMOR: Primary | ICD-10-CM

## 2018-07-12 DIAGNOSIS — Z46.0 CONTACT LENS/GLASSES FITTING: Primary | ICD-10-CM

## 2018-07-12 PROCEDURE — 99999 PR PBB SHADOW E&M-EST. PATIENT-LVL II: CPT | Mod: PBBFAC,,, | Performed by: OPHTHALMOLOGY

## 2018-07-12 PROCEDURE — 99499 UNLISTED E&M SERVICE: CPT | Mod: S$GLB,,, | Performed by: OPTOMETRIST

## 2018-07-12 PROCEDURE — 92014 COMPRE OPH EXAM EST PT 1/>: CPT | Mod: S$GLB,,, | Performed by: OPHTHALMOLOGY

## 2018-07-12 PROCEDURE — 92083 EXTENDED VISUAL FIELD XM: CPT | Mod: S$GLB,,, | Performed by: OPHTHALMOLOGY

## 2018-07-12 NOTE — PROGRESS NOTES
HPI     Today I am updating contact lens prescription and refraction before Ms. May sees Dr. Paredes.  She was recently diagnosed with a small mass on   her pituitary and is here for dilation and VF.  H/O giant papillary conjunctivitis.  Patient returned to monthly lenses,   but giant papillary conjunctivitis symptoms continued on the right eye.    She discontinued that lens, which is her distance eye.  She is not averse   to returning to daily disposable lenses.    Last edited by Darby Martinez, OD on 7/12/2018  8:40 AM. (History)        ROS     Negative for: Psychiatric    Last edited by Darby Martinez, OD on 7/12/2018  8:40 AM. (History)        Assessment /Plan     For exam results, see Encounter Report.    Contact lens/glasses fitting    Hyperopia with presbyopia of both eyes      Stable refractive error.  Updated contact lens prescription in AV 1 Day.  Patient will continue with monovision..

## 2018-07-12 NOTE — LETTER
[facility] - Ophthalmology  [address]  [place] [address]  Phone: [phone]  Fax: [phone]   July 12, 2018    Sami Wilks MD  [address]  [address]  [facility]  [place] [address]    Patient: Jaylyn May   MR Number: [id number]   YOB: 1955   Date of Visit: 7/12/2018       Dear Dr. Wilks :    Thank you for referring Jaylyn May to me for evaluation. Here is my assessment and plan of care:     /   :  1. Pituitary tumor   Recently diagnosed as incidental finding on neuroimaging and has seen Dr Juarez (endo) and Dr Barakat (NeuroSx) and OK to observe at this point. There is no evidence of chiasmal compression by optic nerve evaluation and VF testing. RTC in 6 months to rpt NVF or prn.           RTC 6 months repeat HVF,GOCT          If you have questions, please do not hesitate to call me. I look forward to following Ms. Jaylyn May along with you.    Sincerely,        Guy Paredes MD       CC  Rose Juarez MD

## 2018-07-12 NOTE — PROGRESS NOTES
SUBJECTIVE:   Jaylyn May is a 62 y.o. female   Uncorrected distance visual acuity was 20/50 in the right eye and not recorded in the left eye. Corrected distance visual acuity was not recorded in the right eye and Near in the left eye. Uncorrected near visual acuity was Distance in the right eye and not recorded in the left eye. Corrected near visual acuity was not recorded in the right eye and J1 in the left eye.   No chief complaint on file.       HPI:  HPI       Patient states she was diagnosed with a pituitary tumor pressing on her   optic nerve approx. 3 weeks ago , patient has no visual symptoms , Dr. SONAM Martinez did a scl update today.      Pitiatary Tumor   SCL WEAR OS -READING SLC     Last edited by TRUONG Stock on 7/12/2018  9:09 AM. (History)        Assessment /Plan :  1. Pituitary tumor   Recently diagnosed as incidental finding on neuroimaging and has seen Dr Juarez (endo) and Dr Barakat (NeuroSx) and OK to observe at this point. There is no evidence of chiasmal compression but optic nerve evaluation and VF testing. RTC in 6 months to rpt NVF or prn.           RTC 6 months repeat HVF,GOCT

## 2018-07-13 ENCOUNTER — TELEPHONE (OUTPATIENT)
Dept: ENDOCRINOLOGY | Facility: CLINIC | Age: 63
End: 2018-07-13

## 2018-07-13 LAB
IGF-I SERPL-MCNC: 104 NG/ML (ref 35–201)
IGF-I Z-SCORE SERPL: 0.25 SD

## 2018-07-13 NOTE — TELEPHONE ENCOUNTER
----- Message from Brittaney Luciano sent at 7/13/2018  7:49 AM CDT -----  Contact: pt  States she calling regarding her lab results. States if you get her voice mail, it is fine to leave a message. Please call pt at [phone]. Thank you

## 2018-07-13 NOTE — TELEPHONE ENCOUNTER
Phoned patient back and patient was wanting her results of her recent blood work she did in the clinic this week. I explained that the office is closed every Friday but I will send out a message to the provider. Patient thanked me and the call ended well.

## 2018-07-13 NOTE — TELEPHONE ENCOUNTER
----- Message from Roxanna Rosales sent at 7/13/2018  2:14 PM CDT -----  Contact: Patient  Patient is returning a call, please jovana her back at [phone]. Thank you

## 2018-07-18 ENCOUNTER — TELEPHONE (OUTPATIENT)
Dept: INTERNAL MEDICINE | Facility: CLINIC | Age: 63
End: 2018-07-18

## 2018-07-18 ENCOUNTER — TELEPHONE (OUTPATIENT)
Dept: ENDOCRINOLOGY | Facility: CLINIC | Age: 63
End: 2018-07-18

## 2018-07-18 DIAGNOSIS — I47.10 PAROXYSMAL SVT (SUPRAVENTRICULAR TACHYCARDIA): Primary | ICD-10-CM

## 2018-07-18 NOTE — TELEPHONE ENCOUNTER
Please notify patient that all of her labs were normal regarding her pituitary, her blood sugar was low today she had her blood work though she was fasting that morning.  Please ask her if she had any symptoms such as lightheadedness or dizziness.  I recommend that she purchases a meter such as a [employer] meter which is the cheapest with its strips and monitor her sugars and if she has low sugars can evaluate further.    Otherwise can follow back with me in 6 months since it appears also that her she did not have any visual field defects on her German visual field exam

## 2018-07-18 NOTE — TELEPHONE ENCOUNTER
Pt would like to be called regarding the results of endo appt with Dr. Juarez. I advised pt I will give the message to staff to advise of results. She verbalized understanding.

## 2018-07-19 ENCOUNTER — TELEPHONE (OUTPATIENT)
Dept: ENDOCRINOLOGY | Facility: CLINIC | Age: 63
End: 2018-07-19

## 2018-07-19 NOTE — TELEPHONE ENCOUNTER
Pt advised the following, per MD orders: all of her labs were normal regarding her pituitary, her blood sugar was low today she had her blood work though she was fasting that morning.  Please ask her if she had any symptoms such as lightheadedness or dizziness.  I recommend that she purchases a meter such as a [employer] meter which is the cheapest with its strips and monitor her sugars and if she has low sugars can evaluate further.     Otherwise can follow back with me in 6 months since it appears also that her she did not have any visual field defects on her German visual field exam. Pt verbalized understanding. Call ended pleasantly.

## 2018-07-26 ENCOUNTER — OFFICE VISIT (OUTPATIENT)
Dept: CARDIOLOGY | Facility: CLINIC | Age: 63
End: 2018-07-26
Payer: COMMERCIAL

## 2018-07-26 ENCOUNTER — CLINICAL SUPPORT (OUTPATIENT)
Dept: CARDIOLOGY | Facility: CLINIC | Age: 63
End: 2018-07-26
Payer: COMMERCIAL

## 2018-07-26 VITALS
DIASTOLIC BLOOD PRESSURE: 62 MMHG | HEIGHT: 60 IN | WEIGHT: 130 LBS | HEART RATE: 76 BPM | SYSTOLIC BLOOD PRESSURE: 114 MMHG | BODY MASS INDEX: 25.52 KG/M2

## 2018-07-26 DIAGNOSIS — I47.10 PAROXYSMAL SUPRAVENTRICULAR TACHYCARDIA: Primary | ICD-10-CM

## 2018-07-26 DIAGNOSIS — I47.10 PAROXYSMAL SVT (SUPRAVENTRICULAR TACHYCARDIA): ICD-10-CM

## 2018-07-26 PROCEDURE — 99999 PR PBB SHADOW E&M-EST. PATIENT-LVL III: CPT | Mod: PBBFAC,,, | Performed by: NUCLEAR MEDICINE

## 2018-07-26 PROCEDURE — 99214 OFFICE O/P EST MOD 30 MIN: CPT | Mod: S$GLB,,, | Performed by: NUCLEAR MEDICINE

## 2018-07-26 PROCEDURE — 3008F BODY MASS INDEX DOCD: CPT | Mod: CPTII,S$GLB,, | Performed by: NUCLEAR MEDICINE

## 2018-07-26 PROCEDURE — 93000 ELECTROCARDIOGRAM COMPLETE: CPT | Mod: S$GLB,,, | Performed by: INTERNAL MEDICINE

## 2018-07-26 NOTE — PROGRESS NOTES
Subjective:   Patient ID:  Jaylyn May is a 62 y.o. female who presents for follow-up of Palpitations (PSVT)      HPI 1-PALPITATIONS- PSVT  NO RECURRENT PALPITATIONS. NO RECENT HOSPITALIZATIONS OR ED VISITS FOR EXACERBATION OF CARD ARRHYTHMIAS. OR CHEST PAIN  NO NEAR SYNCOPE OR SYNCOPE  NO UNUSUAL CASTELLON.NO ORTHOPNEA OR PND  NO EDEMA. NO CALVE TENDERNESS  NO FOCAL CNS SYMPTOMS OR SIGNS - RECENTLY DX WITH SMALL PITUITARY TUMOR.  BBS WELL TOLERATED    Review of Systems   Constitution: Negative for chills, fever, weakness, night sweats, weight gain and weight loss.   HENT: Negative for nosebleeds.    Eyes: Negative for blurred vision, double vision and visual disturbance.   Cardiovascular: Negative for chest pain, dyspnea on exertion, irregular heartbeat, leg swelling, orthopnea, palpitations, paroxysmal nocturnal dyspnea and syncope.   Respiratory: Negative for cough, hemoptysis and wheezing.    Endocrine: Negative for polydipsia and polyuria.   Hematologic/Lymphatic: Does not bruise/bleed easily.   Skin: Negative for rash.   Musculoskeletal: Negative for joint pain, joint swelling, muscle weakness and myalgias.   Gastrointestinal: Negative for abdominal pain, hematemesis, jaundice and melena.   Genitourinary: Negative for dysuria, hematuria and nocturia.   Neurological: Negative for dizziness, focal weakness, headaches and sensory change.   Psychiatric/Behavioral: Negative for depression. The patient does not have insomnia and is not nervous/anxious.      Family History   Problem Relation Age of Onset    Hypertension Mother     Cataracts Mother     Blindness Maternal Aunt     Hypertension Maternal Aunt     Cataracts Maternal Aunt     Breast cancer Sister     Cancer Maternal Grandmother     Breast cancer Maternal Grandmother     Leukemia Paternal Aunt      Past Medical History:   Diagnosis Date    Abnormal Pap smear     over 15 years     Pituitary tumor 06/2018    Supraventricular tachycardia       Current Outpatient Prescriptions on File Prior to Visit   Medication Sig Dispense Refill    aspirin (ECOTRIN) 81 MG EC tablet Take 81 mg by mouth once daily.      cetirizine (ZYRTEC) 10 MG tablet Take 10 mg by mouth as needed.       GARLIC EXTRACT ORAL Take 1 capsule by mouth once daily.      ibandronate (BONIVA) 150 mg tablet take one tablet by mouth every 30 days as directed 3 tablet 3    LORazepam (ATIVAN) 1 MG tablet Take one tablet by mouth every 6 (six) hours as needed. 30 tablet 2    melatonin 10 mg Tab Take 20 mg by mouth every evening.      metoprolol succinate (TOPROL-XL) 25 MG 24 hr tablet Take 0.5 tablets (12.5 mg total) by mouth once daily. 45 tablet 3    rizatriptan (MAXALT) 10 MG tablet Take 10 mg by mouth as needed for Migraine.      simvastatin (ZOCOR) 20 MG tablet Take 1 tablet by mouth once daily.      venlafaxine (EFFEXOR-XR) 75 MG 24 hr capsule Take 75 mg by mouth 2 (two) times daily.       [DISCONTINUED] MULTIVIT,THER IRON,CA,FA & MIN (MULTIVITAMIN) Tab Take 1 tablet by mouth once daily.       No current facility-administered medications on file prior to visit.      Review of patient's allergies indicates:   Allergen Reactions    Penicillins      rash       Objective:     Physical Exam   Constitutional: She is oriented to person, place, and time. She appears well-developed. No distress.   HENT:   Head: Normocephalic.   Eyes: Conjunctivae are normal. Pupils are equal, round, and reactive to light. No scleral icterus.   Neck: Normal range of motion. Neck supple. Normal carotid pulses, no hepatojugular reflux and no JVD present. Carotid bruit is not present. No edema present. No thyroid mass and no thyromegaly present.   Cardiovascular: Normal rate, regular rhythm, S1 normal, S2 normal, normal heart sounds and intact distal pulses.  PMI is not displaced.  Exam reveals no gallop and no friction rub.    No murmur heard.  Pulses:       Carotid pulses are 2+ on the right side, and 2+  on the left side.       Radial pulses are 2+ on the right side, and 2+ on the left side.        Femoral pulses are 2+ on the right side, and 2+ on the left side.       Popliteal pulses are 2+ on the right side, and 2+ on the left side.        Dorsalis pedis pulses are 2+ on the right side, and 2+ on the left side.        Posterior tibial pulses are 2+ on the right side, and 2+ on the left side.   Pulmonary/Chest: Effort normal and breath sounds normal. She has no wheezes. She has no rales. She exhibits no tenderness.   Abdominal: Soft. Bowel sounds are normal. She exhibits no pulsatile midline mass and no mass. There is no hepatosplenomegaly. There is no tenderness.   Musculoskeletal: Normal range of motion. She exhibits no edema or tenderness.        Cervical back: Normal.        Thoracic back: Normal.        Lumbar back: Normal.   Lymphadenopathy:     She has no cervical adenopathy.     She has no axillary adenopathy.        Right: No supraclavicular adenopathy present.        Left: No supraclavicular adenopathy present.   Neurological: She is alert and oriented to person, place, and time. She has normal strength and normal reflexes. No sensory deficit. Gait normal.   Skin: Skin is warm. No rash noted. No cyanosis. No pallor. Nails show no clubbing.   Psychiatric: She has a normal mood and affect. Her speech is normal and behavior is normal. Cognition and memory are normal.       Assessment:     1. Paroxysmal supraventricular tachycardia      STABLE CV STATUS  CARD ARRHYTHMIAS CONTROLLED- ECG TODAY- SR, CHRONIC NS ST T CHANGES  Plan:     Paroxysmal supraventricular tachycardia      1- CONTINUE PRESENT CARD MANAGEMENT    2- RETURN IN ONE YEAR

## 2018-10-07 DIAGNOSIS — I47.10 PSVT (PAROXYSMAL SUPRAVENTRICULAR TACHYCARDIA): ICD-10-CM

## 2018-10-07 RX ORDER — METOPROLOL SUCCINATE 25 MG/1
TABLET, EXTENDED RELEASE ORAL
Qty: 45 TABLET | Refills: 3 | Status: SHIPPED | OUTPATIENT
Start: 2018-10-07 | End: 2019-09-28 | Stop reason: SDUPTHER

## 2018-11-13 ENCOUNTER — IMMUNIZATION (OUTPATIENT)
Dept: INTERNAL MEDICINE | Facility: CLINIC | Age: 63
End: 2018-11-13
Payer: COMMERCIAL

## 2018-11-13 PROCEDURE — 90686 IIV4 VACC NO PRSV 0.5 ML IM: CPT | Mod: S$GLB,,, | Performed by: INTERNAL MEDICINE

## 2018-11-13 PROCEDURE — 90471 IMMUNIZATION ADMIN: CPT | Mod: S$GLB,,, | Performed by: INTERNAL MEDICINE

## 2019-01-28 ENCOUNTER — OFFICE VISIT (OUTPATIENT)
Dept: OPHTHALMOLOGY | Facility: CLINIC | Age: 64
End: 2019-01-28
Payer: COMMERCIAL

## 2019-01-28 DIAGNOSIS — D49.7 PITUITARY TUMOR: Primary | ICD-10-CM

## 2019-01-28 PROCEDURE — 92083 EXTENDED VISUAL FIELD XM: CPT | Mod: S$GLB,,, | Performed by: OPHTHALMOLOGY

## 2019-01-28 PROCEDURE — 92133 CPTRZD OPH DX IMG PST SGM ON: CPT | Mod: S$GLB,,, | Performed by: OPHTHALMOLOGY

## 2019-01-28 PROCEDURE — 99999 PR PBB SHADOW E&M-EST. PATIENT-LVL II: CPT | Mod: PBBFAC,,, | Performed by: OPHTHALMOLOGY

## 2019-01-28 PROCEDURE — 92133 POSTERIOR SEGMENT OCT OPTIC NERVE(OCULAR COHERENCE TOMOGRAPHY) - OU - BOTH EYES: ICD-10-PCS | Mod: S$GLB,,, | Performed by: OPHTHALMOLOGY

## 2019-01-28 PROCEDURE — 92083 HUMPHREY VISUAL FIELD - OU - BOTH EYES: ICD-10-PCS | Mod: S$GLB,,, | Performed by: OPHTHALMOLOGY

## 2019-01-28 PROCEDURE — 92012 INTRM OPH EXAM EST PATIENT: CPT | Mod: S$GLB,,, | Performed by: OPHTHALMOLOGY

## 2019-01-28 PROCEDURE — 92012 PR EYE EXAM, EST PATIENT,INTERMED: ICD-10-PCS | Mod: S$GLB,,, | Performed by: OPHTHALMOLOGY

## 2019-01-28 PROCEDURE — 99999 PR PBB SHADOW E&M-EST. PATIENT-LVL II: ICD-10-PCS | Mod: PBBFAC,,, | Performed by: OPHTHALMOLOGY

## 2019-01-28 NOTE — PROGRESS NOTES
SUBJECTIVE:   Jaylyn May is a 63 y.o. female   Uncorrected distance visual acuity was 20/30 in the right eye and 20/30 -1 in the left eye.   Chief Complaint   Patient presents with    Follow-up     HVF gOCT, pituitary tumor follow up        HPI:  HPI     Follow-up      Additional comments: HVF gOCT, pituitary tumor follow up              Comments     1. Pituitary Tumor (incidental finding on neuroimaging 6/18)  Dr Wilks- Neurosurgery -OK to observe  Dr Juarez- Endo  2. SCL WEAR OS -READING SLC          Last edited by Arpita Edmondson MA on 1/28/2019  9:07 AM. (History)        Assessment /Plan :  1. Pituitary tumor   Had repeat neuroimaging in December with no interval growth. Her VF testing today is stable as well and no ocular findings to suggest progression.       RTC in 6 months for dilation and neuro VF

## 2019-01-28 NOTE — LETTER
[facility] - Ophthalmology  [address]  [place] [address]  Phone: [phone]  Fax: [phone]   January 28, 2019    Sami Wilks MD  [address]  [address]  [place] [address]    Patient: Jaylyn May   MR Number: [id number]   YOB: 1955   Date of Visit: 1/28/2019       Dear Dr. Wilks:    Thank you for referring Jaylyn May to me for evaluation. Here is my assessment and plan of care:    Assessment:   /    Plan:   :  1. Pituitary tumor   Had repeat neuroimaging in December with no interval growth. Her VF testing today is stable as well and no ocular findings to suggest progression.       RTC in 6 months for dilation and neuro VF              Below you will find my full exam findings. If you have questions, please do not hesitate to call me. I look forward to following Ms. Jaylyn May along with you.    Sincerely,        Guy Paredes MD       CC  Rose Juarez MD             Base Eye Exam     Visual Acuity (Snellen - Linear)       Right Left    Dist sc 20/30 20/30 -1          Tonometry (Applanation, 9:23 AM)       Right Left    Pressure 11 11          Neuro/Psych     Oriented x3:  Yes    Mood/Affect:  Normal            Slit Lamp and Fundus Exam     External Exam       Right Left    External Normal Normal          Slit Lamp Exam       Right Left    Lids/Lashes Normal Normal    Conjunctiva/Sclera White and quiet White and quiet    Cornea Clear Clear    Anterior Chamber moderate shallowing moderate shallowing    Iris Round and reactive Round and reactive    Lens Clear Clear    Vitreous Normal Normal          Fundus Exam       Right Left    Disc no pallor or edema no pallor or edema    C/D Ratio Vertical 0.3 0.25    Macula Normal Normal    Vessels Normal Normal

## 2019-02-06 ENCOUNTER — OFFICE VISIT (OUTPATIENT)
Dept: ENDOCRINOLOGY | Facility: CLINIC | Age: 64
End: 2019-02-06
Payer: COMMERCIAL

## 2019-02-06 ENCOUNTER — LAB VISIT (OUTPATIENT)
Dept: LAB | Facility: HOSPITAL | Age: 64
End: 2019-02-06
Attending: INTERNAL MEDICINE
Payer: COMMERCIAL

## 2019-02-06 VITALS
HEIGHT: 60 IN | BODY MASS INDEX: 25.58 KG/M2 | SYSTOLIC BLOOD PRESSURE: 110 MMHG | DIASTOLIC BLOOD PRESSURE: 66 MMHG | HEART RATE: 90 BPM | TEMPERATURE: 98 F | WEIGHT: 130.31 LBS

## 2019-02-06 DIAGNOSIS — R93.0 MASS IN REGION OF SELLA TURCICA PRESENT ON MAGNETIC RESONANCE IMAGING: Primary | ICD-10-CM

## 2019-02-06 DIAGNOSIS — R93.0 MASS IN REGION OF SELLA TURCICA PRESENT ON MAGNETIC RESONANCE IMAGING: ICD-10-CM

## 2019-02-06 LAB
ANION GAP SERPL CALC-SCNC: 7 MMOL/L
BUN SERPL-MCNC: 12 MG/DL
CALCIUM SERPL-MCNC: 10 MG/DL
CHLORIDE SERPL-SCNC: 103 MMOL/L
CO2 SERPL-SCNC: 29 MMOL/L
CREAT SERPL-MCNC: 0.7 MG/DL
EST. GFR  (AFRICAN AMERICAN): >60 ML/MIN/1.73 M^2
EST. GFR  (NON AFRICAN AMERICAN): >60 ML/MIN/1.73 M^2
GLUCOSE SERPL-MCNC: 97 MG/DL
POTASSIUM SERPL-SCNC: 4.6 MMOL/L
PROLACTIN SERPL IA-MCNC: 7.5 NG/ML
SODIUM SERPL-SCNC: 139 MMOL/L
T3FREE SERPL-MCNC: 2.4 PG/ML
T4 FREE SERPL-MCNC: 0.96 NG/DL
TSH SERPL DL<=0.005 MIU/L-ACNC: 1.22 UIU/ML

## 2019-02-06 PROCEDURE — 84439 ASSAY OF FREE THYROXINE: CPT

## 2019-02-06 PROCEDURE — 99999 PR PBB SHADOW E&M-EST. PATIENT-LVL III: CPT | Mod: PBBFAC,,, | Performed by: INTERNAL MEDICINE

## 2019-02-06 PROCEDURE — 3008F PR BODY MASS INDEX (BMI) DOCUMENTED: ICD-10-PCS | Mod: CPTII,S$GLB,, | Performed by: INTERNAL MEDICINE

## 2019-02-06 PROCEDURE — 84481 FREE ASSAY (FT-3): CPT

## 2019-02-06 PROCEDURE — 99213 PR OFFICE/OUTPT VISIT, EST, LEVL III, 20-29 MIN: ICD-10-PCS | Mod: S$GLB,,, | Performed by: INTERNAL MEDICINE

## 2019-02-06 PROCEDURE — 36415 COLL VENOUS BLD VENIPUNCTURE: CPT

## 2019-02-06 PROCEDURE — 3008F BODY MASS INDEX DOCD: CPT | Mod: CPTII,S$GLB,, | Performed by: INTERNAL MEDICINE

## 2019-02-06 PROCEDURE — 84443 ASSAY THYROID STIM HORMONE: CPT

## 2019-02-06 PROCEDURE — 99999 PR PBB SHADOW E&M-EST. PATIENT-LVL III: ICD-10-PCS | Mod: PBBFAC,,, | Performed by: INTERNAL MEDICINE

## 2019-02-06 PROCEDURE — 80048 BASIC METABOLIC PNL TOTAL CA: CPT

## 2019-02-06 PROCEDURE — 84146 ASSAY OF PROLACTIN: CPT

## 2019-02-06 PROCEDURE — 99213 OFFICE O/P EST LOW 20 MIN: CPT | Mod: S$GLB,,, | Performed by: INTERNAL MEDICINE

## 2019-02-06 NOTE — PROGRESS NOTES
Patient ID: Jaylyn May is a 63 y.o. female.  Patient is here for follow up        Chief Complaint: Hyperprolactinemia      HPI   Consultation requested by Dr. MELINDA Canseco Jr.    HPI  Patient was referred for evaluation for mass in the sella turcica as well as elevated prolactin level.  She was seen in the emergency room on June 15th after onset of excruciating headache.  She had a CT scan of her head done at [facility] on Petra 15, 2018 that showed an abnormality in the pituitary area and this revealed a 7 mm hyperdense mass in the expected area of the pituitary and she was told to follow up with her PCP for an MRI which she did have at the same hospital on June 21, 2018 a sellar/suprasellar mass that may relate to a proteinaceous Rathke's cleft cyst or potentially pituitary macroadenoma.  Cystic craniopharyngioma is also possible and  it mentioned that the lesion demonstrated mild mass effect on the optic chiasm.  It was hyperintense on T1 weighted imaging.  And it measures 7.2 x 1.48 x 0.97 mm    Patient brought a CD that had both the CT and MRI which I did review the images as well as the report of both tests.  She had been referred to a neurosurgeon at the neuro- medical Dr. Collins who  referred her to Ophthalmology for visual field testing and who has not recommended surgery but observation      Patient does report several year history of migraine headaches which have not worsened, she denies double vision or blurry vision    She recently had a no other visual field test [facility] Dr. Paredes which was normal and no evidence bitemporal hemianopsia    I have reviewed the past medical, family and social history    Review of Systems   Constitutional: Negative for appetite change, fatigue, fever and unexpected weight change.   HENT: Negative for sore throat and trouble swallowing.    Eyes: Negative for visual disturbance.   Respiratory: Negative for shortness of breath and wheezing.     Cardiovascular: Negative for chest pain, palpitations and leg swelling.   Gastrointestinal: Negative for diarrhea, nausea and vomiting.   Endocrine: Negative for cold intolerance, heat intolerance, polydipsia, polyphagia and polyuria.   Genitourinary: Negative for difficulty urinating, dysuria and menstrual problem.   Musculoskeletal: Negative for arthralgias and joint swelling.   Skin: Negative for rash.   Neurological: Negative for dizziness, weakness, numbness and headaches.   Psychiatric/Behavioral: Negative for confusion, dysphoric mood and sleep disturbance.       Objective:      Physical Exam   Constitutional: She appears well-developed and well-nourished. No distress.   HENT:   Head: Normocephalic and atraumatic.   Eyes: Conjunctivae and EOM are normal. Pupils are equal, round, and reactive to light. No scleral icterus.   Neck: No JVD present. No tracheal deviation present. No thyromegaly present.   Cardiovascular: Normal rate, regular rhythm, normal heart sounds and intact distal pulses. Exam reveals no gallop and no friction rub.   No murmur heard.  Pulmonary/Chest: Effort normal and breath sounds normal. No stridor. No respiratory distress. She has no wheezes. She has no rales. She exhibits no tenderness.   Musculoskeletal: She exhibits no edema or deformity.   Lymphadenopathy:     She has no cervical adenopathy.   Neurological: She is alert.   Skin: Skin is warm and dry. She is not diaphoretic.   Psychiatric: She has a normal mood and affect. Her behavior is normal.   Vitals reviewed.        Lab Review:   No visits with results within 6 Month(s) from this visit.   Latest known visit with results is:   Lab Visit on 07/09/2018   Component Date Value    Cortisol, 24H Ur 07/09/2018 12     Cortisol Clt Tm 07/09/2018 24     Urine Volume Cortisol 07/09/2018 2125     Urine Volume 07/09/2018 2125     Urine Collection Duration 07/09/2018 24     Urine, Creatinine 07/09/2018 38.0     Creatinine, Timed Urine  07/09/2018 33.6*    Creatinine, Ur (mg/spec) 07/09/2018 807.5        Assessment:     1. Mass in region of sella turcica present on magnetic resonance imaging  Prolactin    T4, free    T3, free    TSH    Basic metabolic panel    patient has had no growth per report from her neurosurgery in however she had an MRA just to ensure that there was no aneurysm.  She has a follow-up with her neurosurgeon next week.  Currently asymptomatic.  The mild elevation of prolactin is likely from stalk compression but I will repeat labs below to ensure stability    I will request records from her neurosurgeon  Plan:   Mass in region of sella turcica present on magnetic resonance imaging  -     Prolactin; Future; Expected date: 02/06/2019  -     T4, free; Future; Expected date: 02/06/2019  -     T3, free; Future; Expected date: 02/06/2019  -     TSH; Future; Expected date: 02/06/2019  -     Basic metabolic panel; Future; Expected date: 02/06/2019          Follow-up in about 6 months (around 8/6/2019).    Labs prior to appointment? not applicable     Disclaimer:  This note may have been partially prepared using voice recognition software and  it may have not been extensively proofed, as such there could be errors within the text such as sound alike errors.

## 2019-02-07 ENCOUNTER — TELEPHONE (OUTPATIENT)
Dept: ENDOCRINOLOGY | Facility: CLINIC | Age: 64
End: 2019-02-07

## 2019-02-07 NOTE — TELEPHONE ENCOUNTER
Spoke with pt, gave lab results and reminded pt to call the help line to assist with signing up on the pt portal

## 2019-02-07 NOTE — TELEPHONE ENCOUNTER
----- Message from Rose Juarez MD sent at 2/7/2019  8:32 AM CST -----  Please let patient know her results actually came back and all of them were normal including her prolactin so I will see her in 6 months

## 2019-07-19 ENCOUNTER — TELEPHONE (OUTPATIENT)
Dept: OPHTHALMOLOGY | Facility: CLINIC | Age: 64
End: 2019-07-19

## 2019-07-19 NOTE — TELEPHONE ENCOUNTER
----- Message from Denia Wisdom sent at 7/19/2019 10:35 AM CDT -----  Contact: pt   Call pt in regards to getting her contact Rx fax to another location to get contacts that her insurance will cover.   [phone]

## 2019-07-19 NOTE — TELEPHONE ENCOUNTER
Called pt and rescheduled her contact lens appt with [facility] to Monday so she can renew her contact lens Rx.

## 2019-07-22 ENCOUNTER — OFFICE VISIT (OUTPATIENT)
Dept: OPHTHALMOLOGY | Facility: CLINIC | Age: 64
End: 2019-07-22
Payer: COMMERCIAL

## 2019-07-22 DIAGNOSIS — Z46.0 CONTACT LENS/GLASSES FITTING: Primary | ICD-10-CM

## 2019-07-22 PROCEDURE — 99999 PR PBB SHADOW E&M-EST. PATIENT-LVL II: CPT | Mod: PBBFAC,,, | Performed by: OPTOMETRIST

## 2019-07-22 PROCEDURE — 92310 CONTACT LENS FITTING OU: CPT | Mod: CSM,,, | Performed by: OPTOMETRIST

## 2019-07-22 PROCEDURE — 99499 UNLISTED E&M SERVICE: CPT | Mod: S$GLB,,, | Performed by: OPTOMETRIST

## 2019-07-22 PROCEDURE — 92310 PR CONTACT LENS FITTING (LOW COMPLEXITY): ICD-10-PCS | Mod: CSM,,, | Performed by: OPTOMETRIST

## 2019-07-22 PROCEDURE — 99499 NO LOS: ICD-10-PCS | Mod: S$GLB,,, | Performed by: OPTOMETRIST

## 2019-07-22 PROCEDURE — 99999 PR PBB SHADOW E&M-EST. PATIENT-LVL II: ICD-10-PCS | Mod: PBBFAC,,, | Performed by: OPTOMETRIST

## 2019-07-22 NOTE — PROGRESS NOTES
HPI     Last S[facility] exam 01/28/2019  Pituitary tumor   Screening for glaucoma  RE  Update CL Rx     Last edited by Jules Wisdom MA on 7/22/2019  2:29 PM. (History)            Assessment /Plan     For exam results, see Encounter Report.    Contact lens/glasses fitting      Good fit and VA with current daily SCL.  Rx given.

## 2019-08-07 DIAGNOSIS — I47.10 PSVT (PAROXYSMAL SUPRAVENTRICULAR TACHYCARDIA): Primary | ICD-10-CM

## 2019-08-08 ENCOUNTER — OFFICE VISIT (OUTPATIENT)
Dept: OPHTHALMOLOGY | Facility: CLINIC | Age: 64
End: 2019-08-08
Payer: COMMERCIAL

## 2019-08-08 ENCOUNTER — LAB VISIT (OUTPATIENT)
Dept: LAB | Facility: HOSPITAL | Age: 64
End: 2019-08-08
Attending: INTERNAL MEDICINE
Payer: COMMERCIAL

## 2019-08-08 ENCOUNTER — OFFICE VISIT (OUTPATIENT)
Dept: ENDOCRINOLOGY | Facility: CLINIC | Age: 64
End: 2019-08-08
Payer: COMMERCIAL

## 2019-08-08 ENCOUNTER — CLINICAL SUPPORT (OUTPATIENT)
Dept: CARDIOLOGY | Facility: CLINIC | Age: 64
End: 2019-08-08
Payer: COMMERCIAL

## 2019-08-08 ENCOUNTER — OFFICE VISIT (OUTPATIENT)
Dept: CARDIOLOGY | Facility: CLINIC | Age: 64
End: 2019-08-08
Payer: COMMERCIAL

## 2019-08-08 VITALS
BODY MASS INDEX: 25.5 KG/M2 | SYSTOLIC BLOOD PRESSURE: 120 MMHG | HEART RATE: 69 BPM | DIASTOLIC BLOOD PRESSURE: 60 MMHG | HEIGHT: 60 IN | WEIGHT: 129.88 LBS

## 2019-08-08 VITALS
HEIGHT: 60 IN | TEMPERATURE: 98 F | WEIGHT: 129.88 LBS | SYSTOLIC BLOOD PRESSURE: 120 MMHG | DIASTOLIC BLOOD PRESSURE: 60 MMHG | OXYGEN SATURATION: 98 % | HEART RATE: 69 BPM | BODY MASS INDEX: 25.5 KG/M2

## 2019-08-08 DIAGNOSIS — D49.7 PITUITARY TUMOR: Primary | ICD-10-CM

## 2019-08-08 DIAGNOSIS — I47.10 PSVT (PAROXYSMAL SUPRAVENTRICULAR TACHYCARDIA): ICD-10-CM

## 2019-08-08 DIAGNOSIS — D35.2 PITUITARY MACROADENOMA WITH EXTRASELLAR EXTENSION: ICD-10-CM

## 2019-08-08 DIAGNOSIS — H52.03 HYPEROPIA WITH PRESBYOPIA OF BOTH EYES: ICD-10-CM

## 2019-08-08 DIAGNOSIS — I47.10 PAROXYSMAL SUPRAVENTRICULAR TACHYCARDIA: Primary | ICD-10-CM

## 2019-08-08 DIAGNOSIS — D35.2 PITUITARY MACROADENOMA WITH EXTRASELLAR EXTENSION: Primary | ICD-10-CM

## 2019-08-08 DIAGNOSIS — E78.00 PURE HYPERCHOLESTEROLEMIA: ICD-10-CM

## 2019-08-08 DIAGNOSIS — H52.4 HYPEROPIA WITH PRESBYOPIA OF BOTH EYES: ICD-10-CM

## 2019-08-08 LAB
ANION GAP SERPL CALC-SCNC: 10 MMOL/L (ref 8–16)
BUN SERPL-MCNC: 11 MG/DL (ref 8–23)
CALCIUM SERPL-MCNC: 9.8 MG/DL (ref 8.7–10.5)
CHLORIDE SERPL-SCNC: 104 MMOL/L (ref 95–110)
CO2 SERPL-SCNC: 27 MMOL/L (ref 23–29)
CREAT SERPL-MCNC: 0.7 MG/DL (ref 0.5–1.4)
EST. GFR  (AFRICAN AMERICAN): >60 ML/MIN/1.73 M^2
EST. GFR  (NON AFRICAN AMERICAN): >60 ML/MIN/1.73 M^2
GLUCOSE SERPL-MCNC: 110 MG/DL (ref 70–110)
POTASSIUM SERPL-SCNC: 4.3 MMOL/L (ref 3.5–5.1)
PROLACTIN SERPL IA-MCNC: 10.4 NG/ML (ref 5.2–26.5)
SODIUM SERPL-SCNC: 141 MMOL/L (ref 136–145)
T4 FREE SERPL-MCNC: 0.87 NG/DL (ref 0.71–1.51)
TSH SERPL DL<=0.005 MIU/L-ACNC: 1.61 UIU/ML (ref 0.4–4)

## 2019-08-08 PROCEDURE — 99213 OFFICE O/P EST LOW 20 MIN: CPT | Mod: S$GLB,,, | Performed by: INTERNAL MEDICINE

## 2019-08-08 PROCEDURE — 93010 ELECTROCARDIOGRAM REPORT: CPT | Mod: S$GLB,,, | Performed by: INTERNAL MEDICINE

## 2019-08-08 PROCEDURE — 92014 COMPRE OPH EXAM EST PT 1/>: CPT | Mod: S$GLB,,, | Performed by: OPHTHALMOLOGY

## 2019-08-08 PROCEDURE — 92014 PR EYE EXAM, EST PATIENT,COMPREHESV: ICD-10-PCS | Mod: S$GLB,,, | Performed by: OPHTHALMOLOGY

## 2019-08-08 PROCEDURE — 36415 COLL VENOUS BLD VENIPUNCTURE: CPT

## 2019-08-08 PROCEDURE — 99999 PR PBB SHADOW E&M-EST. PATIENT-LVL I: CPT | Mod: PBBFAC,,, | Performed by: OPHTHALMOLOGY

## 2019-08-08 PROCEDURE — 92083 EXTENDED VISUAL FIELD XM: CPT | Mod: S$GLB,,, | Performed by: OPHTHALMOLOGY

## 2019-08-08 PROCEDURE — 99214 OFFICE O/P EST MOD 30 MIN: CPT | Mod: S$GLB,,, | Performed by: NUCLEAR MEDICINE

## 2019-08-08 PROCEDURE — 84443 ASSAY THYROID STIM HORMONE: CPT

## 2019-08-08 PROCEDURE — 3008F BODY MASS INDEX DOCD: CPT | Mod: CPTII,S$GLB,, | Performed by: INTERNAL MEDICINE

## 2019-08-08 PROCEDURE — 92083 HUMPHREY VISUAL FIELD - OU - BOTH EYES: ICD-10-PCS | Mod: S$GLB,,, | Performed by: OPHTHALMOLOGY

## 2019-08-08 PROCEDURE — 99999 PR PBB SHADOW E&M-EST. PATIENT-LVL III: CPT | Mod: PBBFAC,,, | Performed by: NUCLEAR MEDICINE

## 2019-08-08 PROCEDURE — 99213 PR OFFICE/OUTPT VISIT, EST, LEVL III, 20-29 MIN: ICD-10-PCS | Mod: S$GLB,,, | Performed by: INTERNAL MEDICINE

## 2019-08-08 PROCEDURE — 84439 ASSAY OF FREE THYROXINE: CPT

## 2019-08-08 PROCEDURE — 99999 PR PBB SHADOW E&M-EST. PATIENT-LVL I: ICD-10-PCS | Mod: PBBFAC,,, | Performed by: OPHTHALMOLOGY

## 2019-08-08 PROCEDURE — 3008F BODY MASS INDEX DOCD: CPT | Mod: CPTII,S$GLB,, | Performed by: NUCLEAR MEDICINE

## 2019-08-08 PROCEDURE — 99214 PR OFFICE/OUTPT VISIT, EST, LEVL IV, 30-39 MIN: ICD-10-PCS | Mod: S$GLB,,, | Performed by: NUCLEAR MEDICINE

## 2019-08-08 PROCEDURE — 93005 EKG 12-LEAD: ICD-10-PCS | Mod: S$GLB,,, | Performed by: NUCLEAR MEDICINE

## 2019-08-08 PROCEDURE — 3008F PR BODY MASS INDEX (BMI) DOCUMENTED: ICD-10-PCS | Mod: CPTII,S$GLB,, | Performed by: INTERNAL MEDICINE

## 2019-08-08 PROCEDURE — 3008F PR BODY MASS INDEX (BMI) DOCUMENTED: ICD-10-PCS | Mod: CPTII,S$GLB,, | Performed by: NUCLEAR MEDICINE

## 2019-08-08 PROCEDURE — 93005 ELECTROCARDIOGRAM TRACING: CPT | Mod: S$GLB,,, | Performed by: NUCLEAR MEDICINE

## 2019-08-08 PROCEDURE — 84146 ASSAY OF PROLACTIN: CPT

## 2019-08-08 PROCEDURE — 99999 PR PBB SHADOW E&M-EST. PATIENT-LVL III: CPT | Mod: PBBFAC,,, | Performed by: INTERNAL MEDICINE

## 2019-08-08 PROCEDURE — 93010 EKG 12-LEAD: ICD-10-PCS | Mod: S$GLB,,, | Performed by: INTERNAL MEDICINE

## 2019-08-08 PROCEDURE — 80048 BASIC METABOLIC PNL TOTAL CA: CPT

## 2019-08-08 PROCEDURE — 99999 PR PBB SHADOW E&M-EST. PATIENT-LVL III: ICD-10-PCS | Mod: PBBFAC,,, | Performed by: NUCLEAR MEDICINE

## 2019-08-08 PROCEDURE — 99999 PR PBB SHADOW E&M-EST. PATIENT-LVL III: ICD-10-PCS | Mod: PBBFAC,,, | Performed by: INTERNAL MEDICINE

## 2019-08-08 NOTE — PROGRESS NOTES
Patient ID: Jaylyn May is a 63 y.o. female.  Patient is here for follow up        Chief Complaint: Follow-up      HPI   Patient initially was referred for evaluation for mass in the sella turcica as well as elevated prolactin level.  She was seen in the emergency room on June 15th after onset of excruciating headache.  She had a CT scan of her head done at [facility] on Petra 15, 2018 that showed an abnormality in the pituitary area and this revealed a 7 mm hyperdense mass in the expected area of the pituitary and she was told to follow up with her PCP for an MRI which she did have at the same hospital on June 21, 2018 a sellar/suprasellar mass that may relate to a proteinaceous Rathke's cleft cyst or potentially pituitary macroadenoma.  Cystic craniopharyngioma is also possible and  it mentioned that the lesion demonstrated mild mass effect on the optic chiasm.  It was hyperintense on T1 weighted imaging.  And it measures 7.2 x 1.48 x 0.97 mm    Patient brought a CD that had both the CT and MRI which I did review the images as well as the report of both tests.  She had been referred to a neurosurgeon at the neuro- medical Dr. Collins who  referred her to Ophthalmology for visual field testing and who has not recommended surgery but observation      Patient does report several year history of migraine headaches which have not worsened, she denies double vision or blurry vision  Her pituitary hormones have been normal including 24 urine cortisol  Her subsequent visual field tests by [facility] ophthalmologist Dr. Paredes was normal and no evidence bitemporal hemianopsia, most recent 1 was normal as well    She is now retired from [employer] as a nurse    She feels well and no complaints and says her neurosurgeon says the growth his more a cyst and she will follow-up with him in a year  I have reviewed the past medical, family and social history    Review of Systems   Constitutional: Negative for appetite change,  fatigue, fever and unexpected weight change.   HENT: Negative for sore throat and trouble swallowing.    Eyes: Negative for visual disturbance.   Respiratory: Negative for shortness of breath and wheezing.    Cardiovascular: Negative for chest pain, palpitations and leg swelling.   Gastrointestinal: Negative for diarrhea, nausea and vomiting.   Endocrine: Negative for cold intolerance, heat intolerance, polydipsia, polyphagia and polyuria.   Genitourinary: Negative for difficulty urinating, dysuria and menstrual problem.   Musculoskeletal: Negative for arthralgias and joint swelling.   Skin: Negative for rash.   Neurological: Negative for dizziness, weakness, numbness and headaches.   Psychiatric/Behavioral: Negative for confusion, dysphoric mood and sleep disturbance.       Objective:      Physical Exam   Constitutional: She appears well-developed and well-nourished. No distress.   HENT:   Head: Normocephalic and atraumatic.   Eyes: Conjunctivae are normal.   Neck: No JVD present. No tracheal deviation present. No thyromegaly present.   Cardiovascular: Normal rate, regular rhythm, normal heart sounds and intact distal pulses. Exam reveals no gallop and no friction rub.   No murmur heard.  Pulmonary/Chest: Effort normal and breath sounds normal. No stridor. No respiratory distress. She has no wheezes. She has no rales. She exhibits no tenderness.   Musculoskeletal: She exhibits no edema or deformity.   Lymphadenopathy:     She has no cervical adenopathy.   Neurological: She is alert.   Skin: She is not diaphoretic.   Vitals reviewed.        Lab Review:   No visits with results within 6 Month(s) from this visit.   Latest known visit with results is:   Lab Visit on 02/06/2019   Component Date Value    Prolactin 02/06/2019 7.5     Free T4 02/06/2019 0.96     T3, Free 02/06/2019 2.4     TSH 02/06/2019 1.217     Sodium 02/06/2019 139     Potassium 02/06/2019 4.6     Chloride 02/06/2019 103     CO2 02/06/2019 29      Glucose 02/06/2019 97     BUN, Bld 02/06/2019 12     Creatinine 02/06/2019 0.7     Calcium 02/06/2019 10.0     Anion Gap 02/06/2019 7*    eGFR if African American 02/06/2019 >60.0     eGFR if non African Amer* 02/06/2019 >60.0        Assessment:     1. Pituitary macroadenoma with extrasellar extension  T4, free    TSH    Basic metabolic panel    Prolactin    stable and sounds like it may be more a cyst then adenoma and cysts often do not cause pituitary hormonal abnormalities, labs today and follow up in 1 year  Plan:   Pituitary macroadenoma with extrasellar extension  -     T4, free; Future; Expected date: 08/08/2019  -     TSH; Future; Expected date: 08/08/2019  -     Basic metabolic panel; Future; Expected date: 08/08/2019  -     Prolactin; Future; Expected date: 08/08/2019          No follow-ups on file.    Labs prior to appointment? not applicable     Disclaimer:  This note may have been partially prepared using voice recognition software and  it may have not been extensively proofed, as such there could be errors within the text such as sound alike errors.

## 2019-08-08 NOTE — PROGRESS NOTES
Subjective:   Patient ID:  Jaylyn May is a 63 y.o. female who presents for follow-up of Paroxysmal supraventricular tachycardia      HPI   NO RECURRENT PALPITATIONS.  NO RECENT HOSPITALIZATIONS FOR EXACERBATION OF ARRHYTHMIAS.OR ACS. OR ADHF,  NO CHEST DISCOMFORT  NO UNUSUAL CASTELLON.  NO ORTHOPNEA OR PND  NO NEAR SYNCOPE OR SYNCOPE  NO EDEMA. NO CALVE TENDERNESS  NO ABDOMINAL DISCOMFORT  NO FOCAL CNS SYMPTOMS OR SIGNS TO SUGGEST TIA OR STROKE  CARD MED GOOD COMPLIANCE. NO SIDE EFFECTS    Review of Systems   Constitution: Negative for chills, fever, night sweats, weight gain and weight loss.   HENT: Negative for nosebleeds.    Eyes: Negative for blurred vision, double vision and visual disturbance.   Cardiovascular: Negative for chest pain, dyspnea on exertion, irregular heartbeat, leg swelling, orthopnea, palpitations, paroxysmal nocturnal dyspnea and syncope.   Respiratory: Negative for cough, hemoptysis and wheezing.    Endocrine: Negative for polydipsia and polyuria.   Hematologic/Lymphatic: Does not bruise/bleed easily.   Skin: Negative for rash.   Musculoskeletal: Negative for joint pain, joint swelling, muscle weakness and myalgias.   Gastrointestinal: Negative for abdominal pain, hematemesis, jaundice and melena.   Genitourinary: Negative for dysuria, hematuria and nocturia.   Neurological: Negative for dizziness, focal weakness, headaches, sensory change and weakness.   Psychiatric/Behavioral: Negative for depression. The patient does not have insomnia and is not nervous/anxious.      Family History   Problem Relation Age of Onset    Hypertension Mother     Cataracts Mother     Blindness Maternal Aunt     Hypertension Maternal Aunt     Cataracts Maternal Aunt     Breast cancer Sister     Cancer Maternal Grandmother     Breast cancer Maternal Grandmother     Leukemia Paternal Aunt      Past Medical History:   Diagnosis Date    Abnormal Pap smear     over 15 years     Pituitary cyst      Supraventricular tachycardia      Social History     Socioeconomic History    Marital status:      Spouse name: Not on file    Number of children: Not on file    Years of education: Not on file    Highest education level: Not on file   Occupational History    Not on file   Social Needs    Financial resource strain: Not on file    Food insecurity:     Worry: Not on file     Inability: Not on file    Transportation needs:     Medical: Not on file     Non-medical: Not on file   Tobacco Use    Smoking status: Never Smoker    Smokeless tobacco: Never Used   Substance and Sexual Activity    Alcohol use: Yes     Alcohol/week: 4.2 oz     Types: 7 Glasses of wine per week     Comment: socally     Drug use: No    Sexual activity: Yes     Partners: Male     Birth control/protection: None   Lifestyle    Physical activity:     Days per week: Not on file     Minutes per session: Not on file    Stress: Not on file   Relationships    Social connections:     Talks on phone: Not on file     Gets together: Not on file     Attends [employer] service: Not on file     Active member of club or organization: Not on file     Attends meetings of clubs or organizations: Not on file     Relationship status: Not on file   Other Topics Concern    Not on file   Social History Narrative    Wears a seatbelt.     Current Outpatient Medications on File Prior to Visit   Medication Sig Dispense Refill    aspirin (ECOTRIN) 81 MG EC tablet Take 81 mg by mouth once daily.      BIOTIN ORAL Take by mouth.      cetirizine (ZYRTEC) 10 MG tablet Take 10 mg by mouth as needed.       diphenhydrAMINE-acetaminophen (TYLENOL PM)  mg Tab Take 1 tablet by mouth nightly.      GARLIC EXTRACT ORAL Take 1 capsule by mouth once daily.      LORazepam (ATIVAN) 1 MG tablet Take one tablet by mouth every 6 (six) hours as needed. 30 tablet 2    melatonin 10 mg Tab Take 20 mg by mouth every evening.      metoprolol succinate (TOPROL-XL) 25  MG 24 hr tablet TAKE 1/2 TABLET ONCE DAILY 45 tablet 3    multivitamin Liqd Take by mouth once daily.      rizatriptan (MAXALT) 10 MG tablet Take 10 mg by mouth as needed for Migraine.      simvastatin (ZOCOR) 20 MG tablet Take 1 tablet by mouth once daily.      venlafaxine (EFFEXOR-XR) 75 MG 24 hr capsule Take 75 mg by mouth 2 (two) times daily.       ibandronate (BONIVA) 150 mg tablet take one tablet by mouth every 30 days as directed 3 tablet 3     No current facility-administered medications on file prior to visit.      Review of patient's allergies indicates:   Allergen Reactions    Penicillins      rash       Objective:     Physical Exam   Constitutional: She is oriented to person, place, and time. She appears well-developed. No distress.   HENT:   Head: Normocephalic.   Eyes: Pupils are equal, round, and reactive to light. Conjunctivae are normal. No scleral icterus.   Neck: Normal range of motion. Neck supple. Normal carotid pulses, no hepatojugular reflux and no JVD present. Carotid bruit is not present. No edema present. No thyroid mass and no thyromegaly present.   Cardiovascular: Normal rate, regular rhythm, S1 normal, S2 normal, normal heart sounds and intact distal pulses. PMI is not displaced. Exam reveals no gallop and no friction rub.   No murmur heard.  Pulses:       Carotid pulses are 2+ on the right side, and 2+ on the left side.       Radial pulses are 2+ on the right side, and 2+ on the left side.        Femoral pulses are 2+ on the right side, and 2+ on the left side.       Popliteal pulses are 2+ on the right side, and 2+ on the left side.        Dorsalis pedis pulses are 2+ on the right side, and 2+ on the left side.        Posterior tibial pulses are 2+ on the right side, and 2+ on the left side.   Pulmonary/Chest: Effort normal and breath sounds normal. She has no wheezes. She has no rales. She exhibits no tenderness.   Abdominal: Soft. Bowel sounds are normal. She exhibits no  pulsatile midline mass and no mass. There is no hepatosplenomegaly. There is no tenderness.   Musculoskeletal: Normal range of motion. She exhibits no edema or tenderness.        Cervical back: Normal.        Thoracic back: Normal.        Lumbar back: Normal.   Lymphadenopathy:     She has no cervical adenopathy.     She has no axillary adenopathy.        Right: No supraclavicular adenopathy present.        Left: No supraclavicular adenopathy present.   Neurological: She is alert and oriented to person, place, and time. She has normal strength and normal reflexes. No sensory deficit. Gait normal.   Skin: Skin is warm. No rash noted. No cyanosis. No pallor. Nails show no clubbing.   Psychiatric: She has a normal mood and affect. Her speech is normal and behavior is normal. Cognition and memory are normal.       Assessment:     1. Paroxysmal supraventricular tachycardia    2. Pure hypercholesterolemia        Plan:     Paroxysmal supraventricular tachycardia  ECG TODAY- SR, 69 BMP. NORMAL  BBS WELL TOLERATED  NO CLINICAL EVIDENCE OF ADHF,OR ACTIVE MYOCARDIAL ISCHEMIA  CNS STATUS STABLE    Pure hypercholesterolemia  STATIN WELL TOLERATED    CONTINUE PRESENT CARD MANAGEMENT  RETURN IN 6 MONTHS

## 2019-08-08 NOTE — PROGRESS NOTES
SUBJECTIVE:   Jaylyn May is a 63 y.o. female   Corrected distance visual acuity was 20/25 -1 in the right eye and 20/80 -1 in the left eye. Comments: [facility].   No chief complaint on file.       HPI:  HPI     The patient states her eyes are doing well and she denies any ocular pain   or problems at this time. The patient states in february he changed her   Pituitary Tumor DX was changed to Pituitary Cyst and she was discharged   from neuro for 1 year.    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 Pituitary cyst (incidental finding on neuroimaging 6/18)  Appt 12/18 no change  Dr Sami Wilks- Neurosurgery -OK to observe  Dr Rose Juarez- Endo  2. SCL WEAR OS -READING [facility]    Last edited by Zeynep Puga on 8/8/2019  9:36 AM. (History)        Assessment /Plan :  1. Pituitary tumor / Pituitary Cyst - no ocular involvement on exam, neuro HVF was done today and normal    2. Hyperopia with presbyopia of both eyes      RTC 1 year with Neuro HVF

## 2019-08-08 NOTE — LETTER
[facility]  [address]  [place] [address]  Phone: [phone]  Fax: [phone]   August 8, 2019    Sami Wilks MD  [id number]  [address] [facility]  [place] [address]    Patient: Jaylyn May   MR Number: [id number]   YOB: 1955   Date of Visit: 8/8/2019       Dear Dr. Wilks:    Thank you for referring Jaylyn May to me for evaluation. Here is my assessment and plan of care:    Assessment:   /    Plan:   :  1. Pituitary tumor / Pituitary Cyst - no ocular involvement on exam, neuro HVF was done today and normal    2. Hyperopia with presbyopia of both eyes      RTC 1 year with Neuro HVF                   Below you will find my full exam findings. If you have questions, please do not hesitate to call me. I look forward to following Ms. Jaylyn May along with you.    Sincerely,        Guy Paredes MD       CC  Rose Juarez MD             Base Eye Exam     Visual Acuity (Snellen - Linear)       Right Left    Dist cc 20/25 -1 20/80 -1    Correction:  Contacts   [facility] SCL           Tonometry (Applanation, 9:44 AM)       Right Left    Pressure 11 10          Pupils       Pupils Dark Light Shape React APD    Right PERRL 4 3 Round Brisk None    Left PERRL 4 3 Round Brisk None          Extraocular Movement       Right Left     Full Full          Neuro/Psych     Oriented x3:  Yes    Mood/Affect:  Normal          Dilation     Both eyes:  1% Mydriacyl, 2.5% Phenylephrine @ 9:44 AM            Slit Lamp and Fundus Exam     External Exam       Right Left    External Normal Normal          Slit Lamp Exam       Right Left    Lids/Lashes Normal Normal    Conjunctiva/Sclera White and quiet White and quiet    Cornea Clear Clear    Anterior Chamber moderate shallowing moderate shallowing    Iris Round and reactive Round and reactive    Lens Clear Clear    Vitreous PVD PVD          Fundus Exam       Right Left    Disc no pallor or edema no pallor or edema     C/D Ratio Vertical 0.3 0.25    Macula Normal Normal    Vessels Normal Normal    Periphery Normal Normal

## 2019-08-09 ENCOUNTER — TELEPHONE (OUTPATIENT)
Dept: ENDOCRINOLOGY | Facility: CLINIC | Age: 64
End: 2019-08-09

## 2019-08-09 NOTE — TELEPHONE ENCOUNTER
----- Message from Rose Juarez MD sent at 8/8/2019  2:14 PM CDT -----  Please call patient has neurosurgeon at [facility] Wilks for most recent MRI and clinic note

## 2019-09-10 ENCOUNTER — TELEPHONE (OUTPATIENT)
Dept: OBSTETRICS AND GYNECOLOGY | Facility: CLINIC | Age: 64
End: 2019-09-10

## 2019-09-10 DIAGNOSIS — Z12.39 BREAST CANCER SCREENING: Primary | ICD-10-CM

## 2019-09-10 NOTE — TELEPHONE ENCOUNTER
----- Message from Haritha Salazar sent at 9/10/2019  4:17 PM CDT -----  Contact: [phone]  .Type:  Mammogram    Caller is requesting to schedule their annual mammogram appointment.  Order is not listed in EPIC.  Please enter order and contact patient to schedule.  Name of Caller:Jaylyn May  Where would they like the mammogram performed?[facility]   Would the patient rather a call back or a response via [facility]? Call back   Best Call Back Number:.[phone]     Additional Information:

## 2019-09-11 ENCOUNTER — TELEPHONE (OUTPATIENT)
Dept: OBSTETRICS AND GYNECOLOGY | Facility: CLINIC | Age: 64
End: 2019-09-11

## 2019-09-11 NOTE — TELEPHONE ENCOUNTER
----- Message from Haritha Salazar sent at 9/10/2019  4:19 PM CDT -----  Contact: [phone]  Pt would like a call back to schedule an appt. Please call back at [phone].       Thank You,   Haritha Salazar

## 2019-09-11 NOTE — TELEPHONE ENCOUNTER
Returned call to patient.  She requested a wwe and mammo appt.  Appts scheduled 09/19/19 at 11:20 for mammo and 12:30 for wwe.  She confirmed appts and location.  Mammo instructions given and she verbalized understanding.

## 2019-09-19 ENCOUNTER — OFFICE VISIT (OUTPATIENT)
Dept: OBSTETRICS AND GYNECOLOGY | Facility: CLINIC | Age: 64
End: 2019-09-19
Payer: COMMERCIAL

## 2019-09-19 ENCOUNTER — HOSPITAL ENCOUNTER (OUTPATIENT)
Dept: RADIOLOGY | Facility: HOSPITAL | Age: 64
Discharge: HOME | End: 2019-09-19
Attending: NURSE PRACTITIONER
Payer: COMMERCIAL

## 2019-09-19 VITALS
BODY MASS INDEX: 25.19 KG/M2 | HEIGHT: 60 IN | SYSTOLIC BLOOD PRESSURE: 106 MMHG | DIASTOLIC BLOOD PRESSURE: 62 MMHG | WEIGHT: 128.31 LBS

## 2019-09-19 DIAGNOSIS — Z01.419 ENCOUNTER FOR GYNECOLOGICAL EXAMINATION WITHOUT ABNORMAL FINDING: Primary | ICD-10-CM

## 2019-09-19 DIAGNOSIS — Z12.39 BREAST CANCER SCREENING: ICD-10-CM

## 2019-09-19 PROCEDURE — 99396 PR PREVENTIVE VISIT,EST,40-64: ICD-10-PCS | Mod: S$GLB,,, | Performed by: NURSE PRACTITIONER

## 2019-09-19 PROCEDURE — 77067 MAMMO DIGITAL SCREENING BILAT WITH TOMOSYNTHESIS_CAD: ICD-10-PCS | Mod: 26,,, | Performed by: RADIOLOGY

## 2019-09-19 PROCEDURE — 77063 MAMMO DIGITAL SCREENING BILAT WITH TOMOSYNTHESIS_CAD: ICD-10-PCS | Mod: 26,,, | Performed by: RADIOLOGY

## 2019-09-19 PROCEDURE — 77067 SCR MAMMO BI INCL CAD: CPT | Mod: 26,,, | Performed by: RADIOLOGY

## 2019-09-19 PROCEDURE — 99396 PREV VISIT EST AGE 40-64: CPT | Mod: S$GLB,,, | Performed by: NURSE PRACTITIONER

## 2019-09-19 PROCEDURE — 77067 SCR MAMMO BI INCL CAD: CPT | Mod: TC

## 2019-09-19 PROCEDURE — 99999 PR PBB SHADOW E&M-EST. PATIENT-LVL III: ICD-10-PCS | Mod: PBBFAC,,, | Performed by: NURSE PRACTITIONER

## 2019-09-19 PROCEDURE — 99999 PR PBB SHADOW E&M-EST. PATIENT-LVL III: CPT | Mod: PBBFAC,,, | Performed by: NURSE PRACTITIONER

## 2019-09-19 PROCEDURE — 77063 BREAST TOMOSYNTHESIS BI: CPT | Mod: 26,,, | Performed by: RADIOLOGY

## 2019-09-19 NOTE — PROGRESS NOTES
CC: Well woman exam    Jaylyn May is a 63 y.o. female  presents for well woman exam.  LMP: No LMP recorded. Patient is postmenopausal..  No issues, problems, or complaints.    MMG done today.     Past Medical History:   Diagnosis Date    Abnormal Pap smear     over 15 years     Pituitary cyst     Supraventricular tachycardia      Past Surgical History:   Procedure Laterality Date     SECTION      MANDIBLE FRACTURE SURGERY       Social History     Socioeconomic History    Marital status:      Spouse name: Not on file    Number of children: Not on file    Years of education: Not on file    Highest education level: Not on file   Occupational History    Not on file   Social Needs    Financial resource strain: Not on file    Food insecurity:     Worry: Not on file     Inability: Not on file    Transportation needs:     Medical: Not on file     Non-medical: Not on file   Tobacco Use    Smoking status: Never Smoker    Smokeless tobacco: Never Used   Substance and Sexual Activity    Alcohol use: Yes     Alcohol/week: 4.2 oz     Types: 7 Glasses of wine per week     Comment: socally     Drug use: No    Sexual activity: Yes     Partners: Male     Birth control/protection: None   Lifestyle    Physical activity:     Days per week: Not on file     Minutes per session: Not on file    Stress: Not on file   Relationships    Social connections:     Talks on phone: Not on file     Gets together: Not on file     Attends [employer] service: Not on file     Active member of club or organization: Not on file     Attends meetings of clubs or organizations: Not on file     Relationship status: Not on file   Other Topics Concern    Not on file   Social History Narrative    Wears a seatbelt.     Family History   Problem Relation Age of Onset    Hypertension Mother     Cataracts Mother     Blindness Maternal Aunt     Hypertension Maternal Aunt     Cataracts Maternal Aunt     Breast cancer  Sister     Cancer Maternal Grandmother     Breast cancer Maternal Grandmother     Leukemia Paternal Aunt      OB History        2    Para   2    Term   2            AB        Living   2       SAB        TAB        Ectopic        Multiple        Live Births                     /62   Ht 5' (1.524 m)   Wt 58.2 kg (128 lb 4.9 oz)   BMI 25.06 kg/m²       ROS:  GENERAL: Denies weight gain or weight loss. Feeling well overall.   SKIN: Denies rash or lesions.   HEAD: Denies head injury or headache.   NODES: Denies enlarged lymph nodes.   CHEST: Denies chest pain or shortness of breath.   CARDIOVASCULAR: Denies palpitations or left sided chest pain.   ABDOMEN: No abdominal pain, constipation, diarrhea, nausea, vomiting or rectal bleeding.   URINARY: No frequency, dysuria, hematuria, or burning on urination.  REPRODUCTIVE: See HPI.   BREASTS: The patient performs breast self-examination and denies pain, lumps, or nipple discharge.   HEMATOLOGIC: No easy bruisability or excessive bleeding.   MUSCULOSKELETAL: Denies joint pain or swelling.   NEUROLOGIC: Denies syncope or weakness.   PSYCHIATRIC: Denies depression, anxiety or mood swings.    PHYSICAL EXAM:  APPEARANCE: Well nourished, well developed, in no acute distress.  AFFECT: WNL, alert and oriented x 3  SKIN: No acne or hirsutism  NECK: Neck symmetric without masses or thyromegaly  NODES: No inguinal, cervical, axillary, or femoral lymph node enlargement  CHEST: Good respiratory effect  ABDOMEN: Soft.  No tenderness or masses.  No hepatosplenomegaly.  No hernias.  BREASTS: Symmetrical, no skin changes or visible lesions.  No palpable masses, nipple discharge bilaterally.  PELVIC: Normal external genitalia without lesions.  Normal hair distribution.  Adequate perineal body, normal urethral meatus.  Vagina moist and well rugated without lesions or discharge.  Cervix pink, without lesions, discharge or tenderness.  No significant cystocele or  rectocele.  Bimanual exam shows uterus to be normal size, regular, mobile and nontender.  Adnexa without masses or tenderness.    EXTREMITIES: No edema.  Physical Exam    1. Encounter for gynecological examination without abnormal finding      AND PLAN:    Patient was counseled today on A.C.S. Pap guidelines and recommendations for yearly pelvic exams, mammograms and monthly self breast exams; to see her PCP for other health maintenance.

## 2019-09-26 ENCOUNTER — TELEPHONE (OUTPATIENT)
Dept: ENDOCRINOLOGY | Facility: CLINIC | Age: 64
End: 2019-09-26

## 2019-09-26 NOTE — TELEPHONE ENCOUNTER
----- Message from Rose Juarez MD sent at 9/26/2019  1:59 PM CDT -----  Since it is unclear if patient was ever called about her test results please call her to let her know her labs were normal.

## 2019-09-28 DIAGNOSIS — I47.10 PSVT (PAROXYSMAL SUPRAVENTRICULAR TACHYCARDIA): ICD-10-CM

## 2019-09-28 RX ORDER — METOPROLOL SUCCINATE 25 MG/1
TABLET, EXTENDED RELEASE ORAL
Qty: 45 TABLET | Refills: 3 | Status: SHIPPED | OUTPATIENT
Start: 2019-09-28

## 2019-10-18 ENCOUNTER — TELEPHONE (OUTPATIENT)
Dept: HEMATOLOGY/ONCOLOGY | Facility: CLINIC | Age: 64
End: 2019-10-18

## 2019-11-26 ENCOUNTER — OFFICE VISIT (OUTPATIENT)
Dept: OTOLARYNGOLOGY | Facility: CLINIC | Age: 64
End: 2019-11-26
Payer: COMMERCIAL

## 2019-11-26 ENCOUNTER — CLINICAL SUPPORT (OUTPATIENT)
Dept: AUDIOLOGY | Facility: CLINIC | Age: 64
End: 2019-11-26
Payer: COMMERCIAL

## 2019-11-26 VITALS
HEART RATE: 74 BPM | DIASTOLIC BLOOD PRESSURE: 71 MMHG | TEMPERATURE: 98 F | WEIGHT: 129.88 LBS | BODY MASS INDEX: 25.36 KG/M2 | SYSTOLIC BLOOD PRESSURE: 105 MMHG

## 2019-11-26 DIAGNOSIS — H90.5 HEARING LOSS, SENSORINEURAL, COMBINED TYPES: Primary | ICD-10-CM

## 2019-11-26 DIAGNOSIS — H90.3 SENSORINEURAL HEARING LOSS (SNHL) OF BOTH EARS: Primary | ICD-10-CM

## 2019-11-26 PROCEDURE — 99999 PR PBB SHADOW E&M-EST. PATIENT-LVL III: CPT | Mod: PBBFAC,,, | Performed by: ORTHOPAEDIC SURGERY

## 2019-11-26 PROCEDURE — 99204 OFFICE O/P NEW MOD 45 MIN: CPT | Mod: S$GLB,,, | Performed by: ORTHOPAEDIC SURGERY

## 2019-11-26 PROCEDURE — 92567 PR TYMPA2METRY: ICD-10-PCS | Mod: S$GLB,,, | Performed by: AUDIOLOGIST

## 2019-11-26 PROCEDURE — 3008F PR BODY MASS INDEX (BMI) DOCUMENTED: ICD-10-PCS | Mod: CPTII,S$GLB,, | Performed by: ORTHOPAEDIC SURGERY

## 2019-11-26 PROCEDURE — 99999 PR PBB SHADOW E&M-EST. PATIENT-LVL III: ICD-10-PCS | Mod: PBBFAC,,, | Performed by: ORTHOPAEDIC SURGERY

## 2019-11-26 PROCEDURE — 99204 PR OFFICE/OUTPT VISIT, NEW, LEVL IV, 45-59 MIN: ICD-10-PCS | Mod: S$GLB,,, | Performed by: ORTHOPAEDIC SURGERY

## 2019-11-26 PROCEDURE — 92557 COMPREHENSIVE HEARING TEST: CPT | Mod: S$GLB,,, | Performed by: AUDIOLOGIST

## 2019-11-26 PROCEDURE — 3008F BODY MASS INDEX DOCD: CPT | Mod: CPTII,S$GLB,, | Performed by: ORTHOPAEDIC SURGERY

## 2019-11-26 PROCEDURE — 92557 PR COMPREHENSIVE HEARING TEST: ICD-10-PCS | Mod: S$GLB,,, | Performed by: AUDIOLOGIST

## 2019-11-26 PROCEDURE — 92567 TYMPANOMETRY: CPT | Mod: S$GLB,,, | Performed by: AUDIOLOGIST

## 2019-11-26 NOTE — PROGRESS NOTES
Jaylyn May was seen 11/26/2019 for an audiological evaluation.  Patient complains of sudden decrease in hearing about 6 weeks ago. She reports that her left ear is the poorer ear, but that both are decreased. She denies any tinnitus or dizziness.      Results reveal a mild-to-moderately severe  sensorineural hearing loss 0688-3254 Hz for the right ear, and  mild-to-moderately severe sensorineural hearing loss 500-8000 Hz for the left ear.   Speech Reception Thresholds were  25 dBHL for the right ear and 20 dBHL for the left ear.   Word recognition scores were excellent for the right ear and excellent for the left ear.   Tympanograms were Type A, normal for the right ear and Type A, normal for the left ear.    Patient was counseled on the above findings.    Recommendations include:    1.  ENT followup  2.  Hearing aid consult (information was given to patient at today's visit)  3.  Wear hearing protective devices around loud noise  4.  Annual audiograms

## 2019-11-26 NOTE — PROGRESS NOTES
Subjective:       Patient ID: Jaylyn May is a 64 y.o. female.    Chief Complaint: Hearing Loss (Review audiogram)    Patient is a very pleasant 64 y.o. female here to see me today for the first time for evaluation of hearing loss.  She reports hearing loss that has been gradually progressing over the last six weeks.  She has noted any difference in hearing between the ears, with the right ear being the better hearing ear.  She has not noted any tinnitus in either ear.  She has not had any recent issues with ear pain or ear drainage.  She denies a family history of hearing loss, and has not had any previous otologic surgery.  She denies any history of significant loud noise exposure.  She denies issues with dizziness.      Review of Systems   Constitutional: Positive for fatigue. Negative for chills, fever and unexpected weight change.   HENT: Positive for hearing loss. Negative for congestion, dental problem, ear discharge, ear pain, facial swelling, nosebleeds, postnasal drip, rhinorrhea, sinus pressure, sneezing, sore throat, tinnitus, trouble swallowing and voice change.    Eyes: Negative for redness, itching and visual disturbance.   Respiratory: Negative for cough, choking, shortness of breath and wheezing.    Cardiovascular: Negative for chest pain and palpitations.   Gastrointestinal: Negative for abdominal pain.        No reflux.   Musculoskeletal: Negative for gait problem.   Skin: Negative for rash.   Neurological: Negative for dizziness, light-headedness and headaches.       Objective:      Physical Exam   Constitutional: She is oriented to person, place, and time. She appears well-developed and well-nourished. No distress.   HENT:   Head: Normocephalic and atraumatic.   Right Ear: Tympanic membrane, external ear and ear canal normal.   Left Ear: Tympanic membrane, external ear and ear canal normal.   Nose: Nose normal. No mucosal edema, rhinorrhea, nasal deformity or septal deviation. No epistaxis.  Right sinus exhibits no maxillary sinus tenderness and no frontal sinus tenderness. Left sinus exhibits no maxillary sinus tenderness and no frontal sinus tenderness.   Mouth/Throat: Uvula is midline, oropharynx is clear and moist and mucous membranes are normal. Mucous membranes are not pale and not dry. No dental caries. No oropharyngeal exudate or posterior oropharyngeal erythema.   Eyes: Pupils are equal, round, and reactive to light. Conjunctivae, EOM and lids are normal. Right eye exhibits no chemosis. Left eye exhibits no chemosis. Right conjunctiva is not injected. Left conjunctiva is not injected. No scleral icterus. Right eye exhibits normal extraocular motion and no nystagmus. Left eye exhibits normal extraocular motion and no nystagmus.   Neck: Trachea normal and phonation normal. No tracheal tenderness present. No tracheal deviation present. No thyroid mass and no thyromegaly present.   Cardiovascular: Intact distal pulses.   Pulmonary/Chest: Effort normal. No stridor. No respiratory distress.   Abdominal: She exhibits no distension.   Lymphadenopathy:        Head (right side): No submental, no submandibular, no preauricular, no posterior auricular and no occipital adenopathy present.        Head (left side): No submental, no submandibular, no preauricular, no posterior auricular and no occipital adenopathy present.     She has no cervical adenopathy.   Neurological: She is alert and oriented to person, place, and time. No cranial nerve deficit.   Skin: Skin is warm and dry. No rash noted. No erythema.   Psychiatric: She has a normal mood and affect. Her behavior is normal.       AUDIOLOGY:  Bilateral SNHL with type A tympanograms        Assessment:       1. Sensorineural hearing loss (SNHL) of both ears        Plan:       1.  SNHL:  We reviewed the patient's recent audiogram and hearing loss in detail.  We also discussed that she is a good candidate for hearing aids, if and when she the patient is  motivated.  She was given handouts with information and pricing of hearing aids, and will contact audiology when ready to proceed.  We also discussed the use hearing protection when exposed to loud noise, including lawn equipment.  She has a slight asymmetry with the right ear being worse than the left, but not enough to warrant MRI or other evaluation at this point.

## 2020-02-27 ENCOUNTER — LAB VISIT (OUTPATIENT)
Dept: LAB | Facility: HOSPITAL | Age: 65
End: 2020-02-27
Attending: FAMILY MEDICINE
Payer: COMMERCIAL

## 2020-02-27 ENCOUNTER — OFFICE VISIT (OUTPATIENT)
Dept: CARDIOLOGY | Facility: CLINIC | Age: 65
End: 2020-02-27
Payer: COMMERCIAL

## 2020-02-27 VITALS
WEIGHT: 132.06 LBS | HEART RATE: 65 BPM | SYSTOLIC BLOOD PRESSURE: 100 MMHG | BODY MASS INDEX: 25.93 KG/M2 | HEIGHT: 60 IN | OXYGEN SATURATION: 97 % | DIASTOLIC BLOOD PRESSURE: 62 MMHG

## 2020-02-27 DIAGNOSIS — I47.10 PAROXYSMAL SUPRAVENTRICULAR TACHYCARDIA: Primary | ICD-10-CM

## 2020-02-27 DIAGNOSIS — E78.00 PURE HYPERCHOLESTEROLEMIA: ICD-10-CM

## 2020-02-27 DIAGNOSIS — Z00.00 ROUTINE GENERAL MEDICAL EXAMINATION AT A HEALTH CARE FACILITY: ICD-10-CM

## 2020-02-27 LAB
BASOPHILS # BLD AUTO: 0.07 K/UL (ref 0–0.2)
BASOPHILS NFR BLD: 1 % (ref 0–1.9)
CHOLEST SERPL-MCNC: 192 MG/DL (ref 120–199)
CHOLEST/HDLC SERPL: 3.2 {RATIO} (ref 2–5)
DIFFERENTIAL METHOD: ABNORMAL
EOSINOPHIL # BLD AUTO: 0.4 K/UL (ref 0–0.5)
EOSINOPHIL NFR BLD: 5.2 % (ref 0–8)
ERYTHROCYTE [DISTWIDTH] IN BLOOD BY AUTOMATED COUNT: 12.8 % (ref 11.5–14.5)
HCT VFR BLD AUTO: 41.8 % (ref 37–48.5)
HDLC SERPL-MCNC: 60 MG/DL (ref 40–75)
HDLC SERPL: 31.3 % (ref 20–50)
HGB BLD-MCNC: 12.9 G/DL (ref 12–16)
IMM GRANULOCYTES # BLD AUTO: 0.02 K/UL (ref 0–0.04)
IMM GRANULOCYTES NFR BLD AUTO: 0.3 % (ref 0–0.5)
LDLC SERPL CALC-MCNC: 113.6 MG/DL (ref 63–159)
LYMPHOCYTES # BLD AUTO: 3.1 K/UL (ref 1–4.8)
LYMPHOCYTES NFR BLD: 42.3 % (ref 18–48)
MCH RBC QN AUTO: 29.3 PG (ref 27–31)
MCHC RBC AUTO-ENTMCNC: 30.9 G/DL (ref 32–36)
MCV RBC AUTO: 95 FL (ref 82–98)
MONOCYTES # BLD AUTO: 0.6 K/UL (ref 0.3–1)
MONOCYTES NFR BLD: 7.7 % (ref 4–15)
NEUTROPHILS # BLD AUTO: 3.2 K/UL (ref 1.8–7.7)
NEUTROPHILS NFR BLD: 43.5 % (ref 38–73)
NONHDLC SERPL-MCNC: 132 MG/DL
NRBC BLD-RTO: 0 /100 WBC
PLATELET # BLD AUTO: 219 K/UL (ref 150–350)
PMV BLD AUTO: 11.5 FL (ref 9.2–12.9)
RBC # BLD AUTO: 4.4 M/UL (ref 4–5.4)
TRIGL SERPL-MCNC: 92 MG/DL (ref 30–150)
WBC # BLD AUTO: 7.31 K/UL (ref 3.9–12.7)

## 2020-02-27 PROCEDURE — 99214 OFFICE O/P EST MOD 30 MIN: CPT | Mod: S$GLB,,, | Performed by: NUCLEAR MEDICINE

## 2020-02-27 PROCEDURE — 99999 PR PBB SHADOW E&M-EST. PATIENT-LVL III: CPT | Mod: PBBFAC,,, | Performed by: NUCLEAR MEDICINE

## 2020-02-27 PROCEDURE — 3008F PR BODY MASS INDEX (BMI) DOCUMENTED: ICD-10-PCS | Mod: CPTII,S$GLB,, | Performed by: NUCLEAR MEDICINE

## 2020-02-27 PROCEDURE — 80061 LIPID PANEL: CPT

## 2020-02-27 PROCEDURE — 99214 PR OFFICE/OUTPT VISIT, EST, LEVL IV, 30-39 MIN: ICD-10-PCS | Mod: S$GLB,,, | Performed by: NUCLEAR MEDICINE

## 2020-02-27 PROCEDURE — 3008F BODY MASS INDEX DOCD: CPT | Mod: CPTII,S$GLB,, | Performed by: NUCLEAR MEDICINE

## 2020-02-27 PROCEDURE — 99999 PR PBB SHADOW E&M-EST. PATIENT-LVL III: ICD-10-PCS | Mod: PBBFAC,,, | Performed by: NUCLEAR MEDICINE

## 2020-02-27 PROCEDURE — 85025 COMPLETE CBC W/AUTO DIFF WBC: CPT

## 2020-02-27 PROCEDURE — 36415 COLL VENOUS BLD VENIPUNCTURE: CPT

## 2020-08-13 ENCOUNTER — OFFICE VISIT (OUTPATIENT)
Dept: OPHTHALMOLOGY | Facility: CLINIC | Age: 65
End: 2020-08-13
Payer: COMMERCIAL

## 2020-08-13 DIAGNOSIS — H00.012 HORDEOLUM OF RIGHT LOWER EYELID, UNSPECIFIED HORDEOLUM TYPE: ICD-10-CM

## 2020-08-13 DIAGNOSIS — H01.00A BLEPHARITIS OF UPPER AND LOWER EYELIDS OF BOTH EYES, UNSPECIFIED TYPE: ICD-10-CM

## 2020-08-13 DIAGNOSIS — H01.00B BLEPHARITIS OF UPPER AND LOWER EYELIDS OF BOTH EYES, UNSPECIFIED TYPE: ICD-10-CM

## 2020-08-13 DIAGNOSIS — D49.7 PITUITARY TUMOR: Primary | ICD-10-CM

## 2020-08-13 PROCEDURE — 99999 PR PBB SHADOW E&M-EST. PATIENT-LVL II: CPT | Mod: PBBFAC,,, | Performed by: OPHTHALMOLOGY

## 2020-08-13 PROCEDURE — 92083 EXTENDED VISUAL FIELD XM: CPT | Mod: S$GLB,,, | Performed by: OPHTHALMOLOGY

## 2020-08-13 PROCEDURE — 99999 PR PBB SHADOW E&M-EST. PATIENT-LVL II: ICD-10-PCS | Mod: PBBFAC,,, | Performed by: OPHTHALMOLOGY

## 2020-08-13 PROCEDURE — 92014 COMPRE OPH EXAM EST PT 1/>: CPT | Mod: S$GLB,,, | Performed by: OPHTHALMOLOGY

## 2020-08-13 PROCEDURE — 92083 HUMPHREY VISUAL FIELD - OU - BOTH EYES: ICD-10-PCS | Mod: S$GLB,,, | Performed by: OPHTHALMOLOGY

## 2020-08-13 PROCEDURE — 92014 PR EYE EXAM, EST PATIENT,COMPREHESV: ICD-10-PCS | Mod: S$GLB,,, | Performed by: OPHTHALMOLOGY

## 2020-08-13 NOTE — PROGRESS NOTES
SUBJECTIVE  Jaylynshakila May is 64 y.o. female  Corrected distance visual acuity was 20/30 in the right eye and not recorded in the left eye. Corrected near visual acuity was not recorded in the right eye and J1 in the left eye.   Chief Complaint   Patient presents with    Eye Exam     yearly exam and neuro HVF           HPI     Eye Exam      Additional comments: yearly exam and neuro HVF               Comments     1. Pituitary Tumor (incidental finding on neuroimaging 6/18)  Appt 12/18 no change  Dr Sami Wilks- Neurosurgery -OK to observe  Dr Rose Juarez- Endo  2. SCL WEAR OS -READING SLC          Last edited by Arpita Edmondson MA on 8/13/2020  9:03 AM. (History)         Assessment /Plan :  1. Pituitary tumor Released by Dr.Scott Wilks diagnosis was changed to Pituitary Cyst will cont to follow at this time Normal Visual field today    2.      Hordeolum,Right lower lid educated pt on diagnosis recommend warm compresses with Lid Scrubs   3.      Blepharitis Findings and symptoms consistent with moderate blepharitis. The blepharitis instruction sheet was reviewed with the pt, recommending:Warm compresses, Gentle Lid Scrubs and [place] 3 Fish Oils [id number] mg po bid        RTC 1 year HVF

## 2020-08-23 ENCOUNTER — OFFICE VISIT (OUTPATIENT)
Dept: URGENT CARE | Facility: CLINIC | Age: 65
End: 2020-08-23
Payer: COMMERCIAL

## 2020-08-23 ENCOUNTER — HOSPITAL ENCOUNTER (OUTPATIENT)
Dept: RADIOLOGY | Facility: CLINIC | Age: 65
Discharge: HOME | End: 2020-08-23
Attending: PHYSICIAN ASSISTANT
Payer: COMMERCIAL

## 2020-08-23 VITALS
DIASTOLIC BLOOD PRESSURE: 57 MMHG | WEIGHT: 132.06 LBS | HEIGHT: 60 IN | BODY MASS INDEX: 25.93 KG/M2 | TEMPERATURE: 99 F | HEART RATE: 76 BPM | SYSTOLIC BLOOD PRESSURE: 108 MMHG | OXYGEN SATURATION: 96 %
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
## 2020-08-23 DIAGNOSIS — S52.501A CLOSED FRACTURE OF DISTAL END OF RIGHT RADIUS, UNSPECIFIED FRACTURE MORPHOLOGY, INITIAL ENCOUNTER: Primary | ICD-10-CM

## 2020-08-23 DIAGNOSIS — M25.531 RIGHT WRIST PAIN: ICD-10-CM

## 2020-08-23 PROCEDURE — 73110 X-RAY EXAM OF WRIST: CPT | Mod: RT,S$GLB,, | Performed by: RADIOLOGY

## 2020-08-23 PROCEDURE — 73110 XR WRIST COMPLETE 3 VIEWS RIGHT: ICD-10-PCS | Mod: RT,S$GLB,, | Performed by: RADIOLOGY

## 2020-08-23 PROCEDURE — 99214 OFFICE O/P EST MOD 30 MIN: CPT | Mod: S$GLB,,, | Performed by: PHYSICIAN ASSISTANT

## 2020-08-23 PROCEDURE — 99214 PR OFFICE/OUTPT VISIT, EST, LEVL IV, 30-39 MIN: ICD-10-PCS | Mod: S$GLB,,, | Performed by: PHYSICIAN ASSISTANT

## 2020-08-23 NOTE — PROGRESS NOTES
Subjective:       Patient ID: Jaylyn May is a 64 y.o. female.    Vitals:  height is 5' (1.524 m) and weight is 59.9 kg (132 lb 0.9 oz). Her temporal temperature is 98.7 °F (37.1 °C). Her blood pressure is 108/57 (abnormal) and her pulse is 76. Her oxygen saturation is 96%.     Chief Complaint: Wrist Pain    Pt presents with right wrist pain after falling on outstretched hand while rollerblading.  Incident occurred about 1 hr ago.  Patient states the pain is worse with wrist movement or turning motion.  Denies any swelling or wound.  Patient has wrapped her wrist, applied ice, and taken naproxen.  Denies weakness, skin color change, numbness/tingling.    Wrist Pain   The pain is present in the right wrist and right hand. This is a new problem. The current episode started today. The problem occurs constantly. The problem has been waxing and waning. The quality of the pain is described as sharp. The pain is at a severity of 8/10. The pain is moderate. Pertinent negatives include no limited range of motion. The symptoms are aggravated by activity. She has tried cold, rest and OTC pain meds for the symptoms. The treatment provided mild relief.       Constitution: Negative for fatigue.   HENT: Negative for facial swelling and facial trauma.    Neck: Negative for neck stiffness.   Cardiovascular: Negative for chest trauma.   Eyes: Negative for eye trauma, double vision and blurred vision.   Gastrointestinal: Negative for abdominal trauma, abdominal pain and rectal bleeding.   Genitourinary: Negative for hematuria, missed menses, genital trauma and pelvic pain.   Musculoskeletal: Positive for pain. Negative for trauma, joint swelling and abnormal ROM of joint.        Rt wrist    Skin: Negative for color change, wound, abrasion, laceration and erythema.   Neurological: Negative for dizziness, history of vertigo, light-headedness, coordination disturbances, altered mental status and loss of consciousness.    Hematologic/Lymphatic: Negative for history of bleeding disorder.   Psychiatric/Behavioral: Negative for altered mental status.       Objective:      Physical Exam   Constitutional: She is oriented to person, place, and time. She appears well-developed.   HENT:   Head: Normocephalic and atraumatic. Head is without abrasion, without contusion and without laceration.   Ears:   Right Ear: External ear normal.   Left Ear: External ear normal.   Nose: Nose normal.   Mouth/Throat: Oropharynx is clear and moist and mucous membranes are normal.   Eyes: Pupils are equal, round, and reactive to light. Conjunctivae, EOM and lids are normal.   Neck: Trachea normal, full passive range of motion without pain and phonation normal. Neck supple.   Cardiovascular: Normal rate, regular rhythm and normal heart sounds.   Pulmonary/Chest: Effort normal and breath sounds normal. No stridor. No respiratory distress.   Musculoskeletal: Normal range of motion.      Right elbow: Normal.     Right wrist: She exhibits bony tenderness (distal radius ). She exhibits normal range of motion (pain with pronation/supination).      Right hand: Normal. She exhibits normal capillary refill and no swelling. Normal sensation noted. Normal strength noted.   Neurological: She is alert and oriented to person, place, and time.   Skin: Skin is warm, dry, intact and no rash. Capillary refill takes less than 2 seconds. abrasion, burn, bruising, erythema and ecchymosisPsychiatric: Her speech is normal and behavior is normal. Judgment and thought content normal.   Nursing note and vitals reviewed.      Xr Wrist Complete 3 Views Right    Result Date: 8/23/2020  EXAMINATION: XR WRIST COMPLETE 3 VIEWS RIGHT CLINICAL HISTORY: Unspecified fall, initial encounter TECHNIQUE: PA, lateral, and oblique views of the right wrist were performed. COMPARISON: None FINDINGS: There is a nondisplaced fracture of the distal radial metaphysis in this osteopenic patient.  Soft  tissue edema is present about the wrist.  The remaining osseous structures, soft tissues and joint spaces are normal.     Nondisplaced distal radial metaphyseal fracture. Electronically signed by: Kandy Daly MD Date:    08/23/2020 Time:    15:20    Assessment:       1. Closed fracture of distal end of right radius, unspecified fracture morphology, initial encounter    2. Fall, initial encounter    3. Right wrist pain        Plan:       X-ray was unavailable at [place] urgent care location.  Patient had to be sent to the [place] urgent care to obtain wrist x-ray.  Attempted to contact patient several times regarding x-ray results.  Left VM on cell and home numbers for her to call back and discuss findings. Discussed with patient prior to leaving to get x-ray that if there was a fracture she would be referred to ortho and pain medication could be provided if needed.   Will continue to try and contact pt to ensure appropriate follow up.     ADDENDUM 16:02:  Spoke with patient and discussed x-ray findings.  Patient was given number to schedule follow-up appointment with Ortho.  Tramadol 50 mg was called into patient's pharmacy.    Closed fracture of distal end of right radius, unspecified fracture morphology, initial encounter  -     Ambulatory referral/consult to Orthopedics    Fall, initial encounter  -     XR WRIST COMPLETE 3 VIEWS RIGHT; Future; Expected date: 08/23/2020    Right wrist pain  -     XR WRIST COMPLETE 3 VIEWS RIGHT; Future; Expected date: 08/23/2020  -     WRIST BRACE FOR HOME USE

## 2020-08-23 NOTE — PATIENT INSTRUCTIONS
Wrist Sprain  A sprain is an injury to the ligaments or capsule that holds a joint together. There are no broken bones. Most sprains take about 3 to 6 weeks to heal. If it a severe sprain where the ligament is completely torn, it can take months to recover.     Most wrist sprains are treated with a splint, wrist brace, or elastic wrap for support. Severe sprains may require surgery.  Home care  · Keep your arm elevated to reduce pain and swelling. This is very important during the first 48 hours.  · Apply an ice pack over the injured area for 15 to 20 minutes every 3 to 6 hours. You should do this for the first 24 to 48 hours. You can make an ice pack by filling a plastic bag that seals at the top with ice cubes and then wrapping it with a thin towel. Continue to use ice packs for relief of pain and swelling as needed. As the ice melts, be careful to avoid getting your wrap, splint, or cast wet. After 48 hours, apply heat (warm shower or warm bath) for 15 to 20 minutes several times a day, or alternate ice and heat.   · You may use over-the-counter pain medicine to control pain, unless another pain medicine was prescribed. If you have chronic liver or kidney disease or ever had a stomach ulcer or GI bleeding, talk with your doctor before using these medicines.  · If you were given a splint or brace, wear it for the time advised by your doctor.  Follow-up care  Follow up with your healthcare provider as advised. Any X-rays you had today dont show any broken bones, breaks, or fractures. Sometimes fractures dont show up on the first X-ray. Bruises and sprains can sometimes hurt as much as a fracture. These injuries can take time to heal completely. If your symptoms dont improve or they get worse, talk with your doctor. You may need a repeat X-ray. If X-rays were taken, you will be told of any new findings that may affect your care.  When to seek medical advice  Call your healthcare provider right away if any of  these occur:  · Pain or swelling increases  · Fingers or hand becomes cold, blue, numb, or tingly  Date Last Reviewed: 11/20/2015  © 6755-5760 [employer]. [address] [place] [address], [place] [address]. All rights reserved. This information is not intended as a substitute for professional medical care. Always follow your healthcare professional's instructions.

## 2020-08-25 ENCOUNTER — OFFICE VISIT (OUTPATIENT)
Dept: ORTHOPEDICS | Facility: CLINIC | Age: 65
End: 2020-08-25
Payer: COMMERCIAL

## 2020-08-25 ENCOUNTER — TELEPHONE (OUTPATIENT)
Dept: URGENT CARE | Facility: CLINIC | Age: 65
End: 2020-08-25

## 2020-08-25 VITALS
HEIGHT: 60 IN | HEART RATE: 71 BPM | BODY MASS INDEX: 25.91 KG/M2 | SYSTOLIC BLOOD PRESSURE: 117 MMHG | WEIGHT: 132 LBS | DIASTOLIC BLOOD PRESSURE: 73 MMHG

## 2020-08-25 DIAGNOSIS — S52.501A CLOSED FRACTURE OF DISTAL END OF RIGHT RADIUS, UNSPECIFIED FRACTURE MORPHOLOGY, INITIAL ENCOUNTER: Primary | ICD-10-CM

## 2020-08-25 DIAGNOSIS — M25.531 RIGHT WRIST PAIN: Primary | ICD-10-CM

## 2020-08-25 PROCEDURE — 3008F BODY MASS INDEX DOCD: CPT | Mod: CPTII,S$GLB,, | Performed by: PHYSICIAN ASSISTANT

## 2020-08-25 PROCEDURE — 99999 PR PBB SHADOW E&M-EST. PATIENT-LVL III: CPT | Mod: PBBFAC,,, | Performed by: PHYSICIAN ASSISTANT

## 2020-08-25 PROCEDURE — 3008F PR BODY MASS INDEX (BMI) DOCUMENTED: ICD-10-PCS | Mod: CPTII,S$GLB,, | Performed by: PHYSICIAN ASSISTANT

## 2020-08-25 PROCEDURE — 99999 PR PBB SHADOW E&M-EST. PATIENT-LVL III: ICD-10-PCS | Mod: PBBFAC,,, | Performed by: PHYSICIAN ASSISTANT

## 2020-08-25 PROCEDURE — 99204 OFFICE O/P NEW MOD 45 MIN: CPT | Mod: S$GLB,,, | Performed by: PHYSICIAN ASSISTANT

## 2020-08-25 PROCEDURE — 99204 PR OFFICE/OUTPT VISIT, NEW, LEVL IV, 45-59 MIN: ICD-10-PCS | Mod: S$GLB,,, | Performed by: PHYSICIAN ASSISTANT

## 2020-08-25 NOTE — LETTER
August 25, 2020      Darlene Handy PA-C  [address] [place] [address]           [facility]  [address] [place] [id number]  Phone: [phone]  Fax: [phone]          Patient: Jaylyn May   MR Number: [id number]   YOB: 1955   Date of Visit: 8/25/2020       Dear Darlene Handy:    Thank you for referring Jaylyn May to me for evaluation. Attached you will find relevant portions of my assessment and plan of care.    If you have questions, please do not hesitate to call me. I look forward to following Jaylyn May along with you.    Sincerely,    Diana Carias PA-C    Enclosure  CC:  No Recipients    If you would like to receive this communication electronically, please contact [email or web address] or [phone] to request more information on [employer] Link access.    For providers and/or their staff who would like to refer a patient to [facility], please contact us through our one-stop-shop provider referral line, [facility], at [phone].    If you feel you have received this communication in error or would no longer like to receive these types of communications, please e-mail [email or web address]

## 2020-08-25 NOTE — PROGRESS NOTES
Subjective:      Patient ID: Jaylyn May is a 64 y.o. female.    Chief Complaint: Pain of the Right Wrist      HPI: Jaylyn May  is a 64 y.o. female who c/o Pain of the Right Wrist   for duration of 3 days.  She has been working with her 5-year-old grandson to teach him how the roller blade.  She was on roller skates and working with him in her carport.  She states that her feet slipped out from under her and she landed catching herself with her right upper extremity.  She experienced immediate onset of pain in the right wrist.  She was diagnosed with a wrist fracture, put into a carpal tunnel immobilizing brace, and advised to follow up with Orthopedics.  She denies associated numbness and tingling.  She complains of associated swelling and ecchymosis about the right wrist.  Pain level is moderate in severity.  It is controlled well with tramadol, immobilization.  Aggravating factors include movement and lifting.  She also worsened with supination pronation.  Quality is aching, sharp, intermittent.    Past Medical History:   Diagnosis Date    Abnormal Pap smear     over 15 years     Pituitary cyst     Supraventricular tachycardia      Past Surgical History:   Procedure Laterality Date     SECTION      MANDIBLE FRACTURE SURGERY       Family History   Problem Relation Age of Onset    Hypertension Mother     Cataracts Mother     Blindness Maternal Aunt     Hypertension Maternal Aunt     Cataracts Maternal Aunt     Breast cancer Sister     Cancer Maternal Grandmother     Breast cancer Maternal Grandmother     Leukemia Paternal Aunt      Social History     Socioeconomic History    Marital status:      Spouse name: Not on file    Number of children: Not on file    Years of education: Not on file    Highest education level: Not on file   Occupational History    Not on file   Social Needs    Financial resource strain: Not on file    Food insecurity     Worry: Not on file      Inability: Not on file    Transportation needs     Medical: Not on file     Non-medical: Not on file   Tobacco Use    Smoking status: Never Smoker    Smokeless tobacco: Never Used   Substance and Sexual Activity    Alcohol use: Yes     Alcohol/week: 7.0 standard drinks     Types: 7 Glasses of wine per week     Comment: socally     Drug use: No    Sexual activity: Yes     Partners: Male     Birth control/protection: None   Lifestyle    Physical activity     Days per week: Not on file     Minutes per session: Not on file    Stress: Not on file   Relationships    Social connections     Talks on phone: Not on file     Gets together: Not on file     Attends Sabianist service: Not on file     Active member of club or organization: Not on file     Attends meetings of clubs or organizations: Not on file     Relationship status: Not on file   Other Topics Concern    Not on file   Social History Narrative    Wears a seatbelt.     Medication List with Changes/Refills   Current Medications    ASPIRIN (ECOTRIN) 81 MG EC TABLET    Take 81 mg by mouth once daily.    BIOTIN ORAL    Take 1 tablet by mouth once daily.     CETIRIZINE (ZYRTEC) 10 MG TABLET    Take 10 mg by mouth as needed.     DIPHENHYDRAMINE-ACETAMINOPHEN (TYLENOL PM)  MG TAB    Take 1 tablet by mouth nightly.    GARLIC EXTRACT ORAL    Take 1 capsule by mouth once daily.    IBANDRONATE (BONIVA) 150 MG TABLET    take one tablet by mouth every 30 days as directed    LORAZEPAM (ATIVAN) 1 MG TABLET    Take one tablet by mouth every 6 (six) hours as needed.    MELATONIN 10 MG TAB    Take 20 mg by mouth every evening.    METOPROLOL SUCCINATE (TOPROL-XL) 25 MG 24 HR TABLET    TAKE 1/2 TABLET ONCE DAILY    RIZATRIPTAN (MAXALT) 10 MG TABLET    Take 10 mg by mouth as needed for Migraine.    SIMVASTATIN (ZOCOR) 20 MG TABLET    Take 1 tablet by mouth once daily.    VENLAFAXINE (EFFEXOR-XR) 75 MG 24 HR CAPSULE    Take 75 mg by mouth 2 (two) times daily.      Review  of patient's allergies indicates:   Allergen Reactions    Penicillins      rash       Review of Systems   Constitution: Negative for fever.   Cardiovascular: Negative for chest pain.   Respiratory: Negative for cough and shortness of breath.    Skin: Negative for rash.   Musculoskeletal: Positive for falls, joint pain, joint swelling and stiffness.   Gastrointestinal: Negative for heartburn.   Neurological: Negative for headaches and numbness.         Objective:        General    Nursing note and vitals reviewed.  Constitutional: She is oriented to person, place, and time. She appears well-developed and well-nourished.   HENT:   Head: Normocephalic and atraumatic.   Eyes: EOM are normal.   Cardiovascular: Normal rate and regular rhythm.    Pulmonary/Chest: Effort normal.   Abdominal: Soft.   Neurological: She is alert and oriented to person, place, and time.   Psychiatric: She has a normal mood and affect. Her behavior is normal.             Right Hand/Wrist Exam     Tests     Atrophy   Thenar:  negative  Hypothenar:  negative  Intrinsic:  negative  1st Dorsal Interosseous: negative    Other     Neuorologic Exam    Median Distribution: normal  Ulnar Distribution: normal  Radial Distribution: normal    Comments:  2+ radial pulse\  Compartments soft  Tender to palpation distal radius  Swelling and ecchymosis dorsally  Able to make a full fist and wiggle fingers  Sensation intact          Vascular Exam       Capillary Refill  Right Hand: normal capillary refill              Xray images and report were reviewed today.  I agree with the radiologist's interpretation.    XR WRIST COMPLETE 3 VIEWS RIGHT  Narrative: EXAMINATION:  XR WRIST COMPLETE 3 VIEWS RIGHT    CLINICAL HISTORY:  Unspecified fall, initial encounter    TECHNIQUE:  PA, lateral, and oblique views of the right wrist were performed.    COMPARISON:  None    FINDINGS:  There is a nondisplaced fracture of the distal radial metaphysis in this osteopenic patient.   Soft tissue edema is present about the wrist.  The remaining osseous structures, soft tissues and joint spaces are normal.  Impression: Nondisplaced distal radial metaphyseal fracture.    Electronically signed by: Kandy Daly MD  Date:    08/23/2020  Time:    15:20        Assessment:       Encounter Diagnosis   Name Primary?    Closed fracture of distal end of right radius, unspecified fracture morphology, initial encounter Yes          Plan:       Jaylyn was seen today for pain.    Diagnoses and all orders for this visit:    Closed fracture of distal end of right radius, unspecified fracture morphology, initial encounter        Jaylyn May is a new pt who comes in today for the above problems.  She has a nondisplaced extra-articular fracture with good volar tilt.  I would recommend non operative management in a exo splint.  We briefly discussed going into a short-arm cast for period of 2 weeks.  However she declined that stating she would prefer the exo splint which still provides excellent protection and allows her to adjust for swelling as needed.  I recommend she use it as a cast especially over the next 1-2 weeks.  After that, she would likely be able to remove it for hygiene purposes only.  I want to get her new x-ray in 2 weeks out of the splint to re-evaluate fracture alignment.  If there is any displacement, I may have her see Dr. Black for any changes in tx recommendations.  I think this is unlikely.  I have discussed with him and he agrees.  She may continue tramadol for now.  She understands that it is in addictive medication.  She will wean off of it as she is able.  She still has plenty of pills left.  If she needs a refill, I would be happy to refill that 1 time for her.  She may also take Tylenol in conjunction if needed.  She will avoid oral NSAIDs.  She verbalizes understanding and agrees.    Follow up in about 2 weeks (around 9/8/2020) for xray out of splint.          The patient  understands, chooses and consents to this plan and accepts all   the risks which include but are not limited to the risks mentioned above.     Disclaimer: This note was prepared using a voice recognition system and is likely to have sound alike errors within the text.

## 2020-08-28 RX ORDER — TRAMADOL HYDROCHLORIDE 50 MG/1
50 TABLET ORAL EVERY 8 HOURS PRN
Qty: 20 TABLET | Refills: 0 | Status: SHIPPED | OUTPATIENT
Start: 2020-08-28 | End: 2020-10-12

## 2020-08-28 NOTE — TELEPHONE ENCOUNTER
----- Message from Beth Daniel sent at 8/28/2020 10:43 AM CDT -----  Regarding: Refill  Contact: self#-[phone]  Would like to consult with the nurse,  patient would like to know if she can get a Rx refill on her Tramadol, patient would like to speak with the nurse concerning this, patient has some Question concerning her Cast that was put on,  please call back thanks elena   .

## 2020-08-28 NOTE — TELEPHONE ENCOUNTER
Patient informed of refill and verbalized understanding      Approved Prescriptions     traMADoL (ULTRAM) 50 mg tablet         Sig: Take 1 tablet (50 mg total) by mouth every 8 (eight) hours as needed for Pain.    Disp:  20 tablet    Refills:  0    Start: 8/28/2020    Class: Normal    Authorized by: Diana Carias PA-C        To be filled at: [id number] [address]

## 2020-08-28 NOTE — TELEPHONE ENCOUNTER
Spoke with patient and informed her that I would send her request to Diana Carias PA-C for approval. Patient verbalized understanding.

## 2020-09-10 ENCOUNTER — HOSPITAL ENCOUNTER (OUTPATIENT)
Dept: RADIOLOGY | Facility: HOSPITAL | Age: 65
Discharge: HOME | End: 2020-09-10
Attending: PHYSICIAN ASSISTANT
Payer: COMMERCIAL

## 2020-09-10 ENCOUNTER — OFFICE VISIT (OUTPATIENT)
Dept: ORTHOPEDICS | Facility: CLINIC | Age: 65
End: 2020-09-10
Payer: COMMERCIAL

## 2020-09-10 VITALS
WEIGHT: 132.06 LBS | HEIGHT: 60 IN | DIASTOLIC BLOOD PRESSURE: 62 MMHG | BODY MASS INDEX: 25.93 KG/M2 | SYSTOLIC BLOOD PRESSURE: 103 MMHG

## 2020-09-10 DIAGNOSIS — M25.531 RIGHT WRIST PAIN: Primary | ICD-10-CM

## 2020-09-10 DIAGNOSIS — S52.501A CLOSED FRACTURE OF DISTAL END OF RIGHT RADIUS, UNSPECIFIED FRACTURE MORPHOLOGY, INITIAL ENCOUNTER: Primary | ICD-10-CM

## 2020-09-10 DIAGNOSIS — M25.531 RIGHT WRIST PAIN: ICD-10-CM

## 2020-09-10 PROCEDURE — 3008F PR BODY MASS INDEX (BMI) DOCUMENTED: ICD-10-PCS | Mod: CPTII,S$GLB,, | Performed by: PHYSICIAN ASSISTANT

## 2020-09-10 PROCEDURE — 99999 PR PBB SHADOW E&M-EST. PATIENT-LVL III: ICD-10-PCS | Mod: PBBFAC,,, | Performed by: PHYSICIAN ASSISTANT

## 2020-09-10 PROCEDURE — 99213 OFFICE O/P EST LOW 20 MIN: CPT | Mod: S$GLB,,, | Performed by: PHYSICIAN ASSISTANT

## 2020-09-10 PROCEDURE — 73110 X-RAY EXAM OF WRIST: CPT | Mod: 26,RT,, | Performed by: RADIOLOGY

## 2020-09-10 PROCEDURE — 99999 PR PBB SHADOW E&M-EST. PATIENT-LVL III: CPT | Mod: PBBFAC,,, | Performed by: PHYSICIAN ASSISTANT

## 2020-09-10 PROCEDURE — 99213 PR OFFICE/OUTPT VISIT, EST, LEVL III, 20-29 MIN: ICD-10-PCS | Mod: S$GLB,,, | Performed by: PHYSICIAN ASSISTANT

## 2020-09-10 PROCEDURE — 3008F BODY MASS INDEX DOCD: CPT | Mod: CPTII,S$GLB,, | Performed by: PHYSICIAN ASSISTANT

## 2020-09-10 PROCEDURE — 73110 XR WRIST COMPLETE 3 VIEWS RIGHT: ICD-10-PCS | Mod: 26,RT,, | Performed by: RADIOLOGY

## 2020-09-10 PROCEDURE — 73110 X-RAY EXAM OF WRIST: CPT | Mod: TC,RT

## 2020-09-10 NOTE — PROGRESS NOTES
Patient ID: Jaylyn May is a 64 y.o. female.    Chief Complaint: Pain of the Right Wrist      HPI: Jaylyn May  is a 64 y.o. female who c/o Pain of the Right Wrist   for duration of about 2 and half weeks.  I have been treating her non operatively for right distal radius fracture.  At times, pain is 7/10.  She says pain is well controlled in the exo splint as long she does not try to do something she should be doing.  At those times increases to 7/10.  Quality is aching and sharp.  She still complaining of some associated swelling intermittently.  Improved with Tylenol and tramadol.  Also improved with immobilization and rest.  Aggravating factors include trying to use the wrist as well as nighttime.    Past Medical History:   Diagnosis Date    Abnormal Pap smear     over 15 years     Pituitary cyst     Supraventricular tachycardia      Past Surgical History:   Procedure Laterality Date     SECTION      MANDIBLE FRACTURE SURGERY       Family History   Problem Relation Age of Onset    Hypertension Mother     Cataracts Mother     Blindness Maternal Aunt     Hypertension Maternal Aunt     Cataracts Maternal Aunt     Breast cancer Sister     Cancer Maternal Grandmother     Breast cancer Maternal Grandmother     Leukemia Paternal Aunt      Social History     Socioeconomic History    Marital status:      Spouse name: Not on file    Number of children: Not on file    Years of education: Not on file    Highest education level: Not on file   Occupational History    Not on file   Social Needs    Financial resource strain: Not on file    Food insecurity     Worry: Not on file     Inability: Not on file    Transportation needs     Medical: Not on file     Non-medical: Not on file   Tobacco Use    Smoking status: Never Smoker    Smokeless tobacco: Never Used   Substance and Sexual Activity    Alcohol use: Yes     Alcohol/week: 7.0 standard drinks     Types: 7 Glasses of wine per  week     Comment: socally     Drug use: No    Sexual activity: Yes     Partners: Male     Birth control/protection: None   Lifestyle    Physical activity     Days per week: Not on file     Minutes per session: Not on file    Stress: Not on file   Relationships    Social connections     Talks on phone: Not on file     Gets together: Not on file     Attends Hinduism service: Not on file     Active member of club or organization: Not on file     Attends meetings of clubs or organizations: Not on file     Relationship status: Not on file   Other Topics Concern    Not on file   Social History Narrative    Wears a seatbelt.     Medication List with Changes/Refills   Current Medications    ASPIRIN (ECOTRIN) 81 MG EC TABLET    Take 81 mg by mouth once daily.    BIOTIN ORAL    Take 1 tablet by mouth once daily.     CETIRIZINE (ZYRTEC) 10 MG TABLET    Take 10 mg by mouth as needed.     DIPHENHYDRAMINE-ACETAMINOPHEN (TYLENOL PM)  MG TAB    Take 1 tablet by mouth nightly.    GARLIC EXTRACT ORAL    Take 1 capsule by mouth once daily.    IBANDRONATE (BONIVA) 150 MG TABLET    take one tablet by mouth every 30 days as directed    LORAZEPAM (ATIVAN) 1 MG TABLET    Take one tablet by mouth every 6 (six) hours as needed.    MELATONIN 10 MG TAB    Take 20 mg by mouth every evening.    METOPROLOL SUCCINATE (TOPROL-XL) 25 MG 24 HR TABLET    TAKE 1/2 TABLET ONCE DAILY    RIZATRIPTAN (MAXALT) 10 MG TABLET    Take 10 mg by mouth as needed for Migraine.    SIMVASTATIN (ZOCOR) 20 MG TABLET    Take 1 tablet by mouth once daily.    TRAMADOL (ULTRAM) 50 MG TABLET    Take 1 tablet (50 mg total) by mouth every 8 (eight) hours as needed for Pain.    VENLAFAXINE (EFFEXOR-XR) 75 MG 24 HR CAPSULE    Take 75 mg by mouth 2 (two) times daily.      Review of patient's allergies indicates:   Allergen Reactions    Penicillins      rash       Objective:        General    Nursing note and vitals reviewed.  Constitutional: She is oriented to  person, place, and time. She appears well-developed and well-nourished.   HENT:   Head: Normocephalic and atraumatic.   Eyes: EOM are normal.   Cardiovascular: Normal rate and regular rhythm.    Pulmonary/Chest: Effort normal.   Abdominal: Soft.   Neurological: She is alert and oriented to person, place, and time.   Psychiatric: She has a normal mood and affect. Her behavior is normal.             Right Hand/Wrist Exam     Tests     Atrophy   Thenar:  negative  Hypothenar:  negative  Intrinsic:  negative  1st Dorsal Interosseous: negative    Other     Neuorologic Exam    Median Distribution: normal  Ulnar Distribution: normal  Radial Distribution: normal    Comments:  2+ radial pulse\  Compartments soft  Tender to palpation distal radius  Swelling and ecchymosis dorsally  Able to make a full fist and wiggle fingers  Sensation intact  Some painfree PROM wrist            Vascular Exam       Capillary Refill  Right Hand: normal capillary refill      Xray Images and report were reviewed today.  I agree with the radiologist's interpretation.    X-Ray Wrist Complete Right  Narrative: EXAMINATION:  XR WRIST COMPLETE 3 VIEWS RIGHT    CLINICAL HISTORY:  Pain in right wrist    TECHNIQUE:  AP, lateral, and oblique views of the right wrist was performed.    COMPARISON:  08/23/2020    FINDINGS:  Previously described fracture deformity of the distal radial metaphysis is again demonstrated.  Fragment positioning appears unchanged.  Some slightly increased sclerosis noted along the fracture margins.  No new fractures or dislocations visualized.  Impression: As Above    Electronically signed by: Charles Becerra MD  Date:    09/10/2020  Time:    11:23        Assessment:       Encounter Diagnosis   Name Primary?    Closed fracture of distal end of right radius, unspecified fracture morphology, initial encounter Yes          Plan:       Jaylyn was seen today for pain.    Diagnoses and all orders for this visit:    Closed fracture of  distal end of right radius, unspecified fracture morphology, initial encounter        Jaylyn May is an established pt who comes in today for follow-up on the above.  Fracture alignment is stable.  Will continue non operative management and in exo splint.  We briefly discussed putting her into a cast today.  She would like to hold off on that for now.  Starting in about 2 weeks, she can take the splint off a couple of times a day just to work gentle range of motion.  I will see her back in the office in 4 weeks with new x-rays out of the splint.  If she has any difficulty before then, she should notify the office.  She verbalizes understanding and agrees.    Follow up in about 4 weeks (around 10/8/2020) for xray right wrist.    The patient understands, chooses and consents to this plan and accepts all   the risks which include but are not limited to the risks mentioned above.     Disclaimer: This note was prepared using a voice recognition system and is likely to have sound alike errors within the text.

## 2020-09-23 ENCOUNTER — TELEPHONE (OUTPATIENT)
Dept: ORTHOPEDICS | Facility: CLINIC | Age: 65
End: 2020-09-23

## 2020-09-23 NOTE — TELEPHONE ENCOUNTER
Spoke with patient and she states that the knot has gone away. Patient advised to call back to schedule an appointment if the knot reappears and she feels that she needs to be seen. Understanding verbalized.

## 2020-09-23 NOTE — TELEPHONE ENCOUNTER
----- Message from Domingo Carroll sent at 9/23/2020  9:38 AM CDT -----  Contact: self  Would like to consult with nurse regarding knot on right hand.  Pt said the know was not there previously and wanted to know if she needs to come in.  Please contact Jaylyn May @ [phone].  Thanks/As

## 2020-09-25 ENCOUNTER — TELEPHONE (OUTPATIENT)
Dept: OBSTETRICS AND GYNECOLOGY | Facility: CLINIC | Age: 65
End: 2020-09-25

## 2020-09-25 DIAGNOSIS — Z12.31 BREAST CANCER SCREENING BY MAMMOGRAM: Primary | ICD-10-CM

## 2020-09-25 NOTE — TELEPHONE ENCOUNTER
----- Message from Elvia Cr sent at 9/25/2020  2:39 PM CDT -----  Type:  Needs Medical Advice    Who Called:  Pt  Jaylyn  Symptoms (please be specific):  Pt is needing orders to schedule her Mammogram//she has made an appt for her annual on 10-12-20   How long has patient had these symptoms:    Pharmacy name and phone #:   Would the patient rather a call back or a response via [facility]?  Call back  Best Call Back Number:   [phone]  Additional Information:  Please call to schedule//thanks//[facility]

## 2020-10-01 ENCOUNTER — OFFICE VISIT (OUTPATIENT)
Dept: OPHTHALMOLOGY | Facility: CLINIC | Age: 65
End: 2020-10-01
Payer: COMMERCIAL

## 2020-10-01 DIAGNOSIS — H52.4 HYPEROPIA WITH PRESBYOPIA OF BOTH EYES: Primary | ICD-10-CM

## 2020-10-01 DIAGNOSIS — H52.03 HYPEROPIA WITH PRESBYOPIA OF BOTH EYES: Primary | ICD-10-CM

## 2020-10-01 PROCEDURE — 92310 CONTACT LENS FITTING OU: CPT | Mod: CSM,S$GLB,, | Performed by: OPTOMETRIST

## 2020-10-01 PROCEDURE — 99499 UNLISTED E&M SERVICE: CPT | Mod: S$GLB,,, | Performed by: OPTOMETRIST

## 2020-10-01 PROCEDURE — 92310 PR CONTACT LENS FITTING (NO CHANGE): ICD-10-PCS | Mod: CSM,S$GLB,, | Performed by: OPTOMETRIST

## 2020-10-01 PROCEDURE — 99999 PR PBB SHADOW E&M-EST. PATIENT-LVL III: ICD-10-PCS | Mod: PBBFAC,,, | Performed by: OPTOMETRIST

## 2020-10-01 PROCEDURE — 99499 NO LOS: ICD-10-PCS | Mod: S$GLB,,, | Performed by: OPTOMETRIST

## 2020-10-01 PROCEDURE — 99999 PR PBB SHADOW E&M-EST. PATIENT-LVL III: CPT | Mod: PBBFAC,,, | Performed by: OPTOMETRIST

## 2020-10-01 NOTE — PROGRESS NOTES
HPI     NP to DNL  Last seen by [facility] on 8/13/2020  Patient here today for CTL fit   Patient is currently out of CTL  Wear CTL in OS mainly    1. Pituitary cyst (incidental finding on neuroimaging 6/18)  Appt 12/18 8/20 no change  Dr Sami Wilks- Neurosurgery -OK to observe  Dr Rose Juarez- Endo  2. SCL WEAR OS -READING SLC    Last edited by Loni Denise, PCT on 10/1/2020  1:49 PM. (History)              Assessment /Plan     For exam results, see Encounter Report.    Hyperopia with presbyopia of both eyes      Contact Lens Prescription (10/1/2020)        Brand Sphere    Right Acuvue Oasys 1 Day +0.75    Left Acuvue Oasys 1 Day +2.25    Expiration Date: 10/2/2021    Replacement: Daily    Wearing Schedule: Daily wear          RTC 1 yr for cls update or PRN if any problems.   Discussed above and answered questions.

## 2020-10-12 ENCOUNTER — HOSPITAL ENCOUNTER (OUTPATIENT)
Dept: RADIOLOGY | Facility: HOSPITAL | Age: 65
Discharge: HOME | End: 2020-10-12
Attending: PHYSICIAN ASSISTANT
Payer: COMMERCIAL

## 2020-10-12 ENCOUNTER — OFFICE VISIT (OUTPATIENT)
Dept: OBSTETRICS AND GYNECOLOGY | Facility: CLINIC | Age: 65
End: 2020-10-12
Payer: COMMERCIAL

## 2020-10-12 ENCOUNTER — PATIENT MESSAGE (OUTPATIENT)
Dept: ORTHOPEDICS | Facility: CLINIC | Age: 65
End: 2020-10-12

## 2020-10-12 ENCOUNTER — OFFICE VISIT (OUTPATIENT)
Dept: ORTHOPEDICS | Facility: CLINIC | Age: 65
End: 2020-10-12
Payer: COMMERCIAL

## 2020-10-12 VITALS
HEIGHT: 60 IN | BODY MASS INDEX: 25.91 KG/M2 | SYSTOLIC BLOOD PRESSURE: 128 MMHG | WEIGHT: 132 LBS | HEART RATE: 72 BPM | DIASTOLIC BLOOD PRESSURE: 68 MMHG

## 2020-10-12 VITALS
HEIGHT: 60 IN | DIASTOLIC BLOOD PRESSURE: 76 MMHG | WEIGHT: 132.69 LBS | BODY MASS INDEX: 26.05 KG/M2 | SYSTOLIC BLOOD PRESSURE: 126 MMHG

## 2020-10-12 DIAGNOSIS — M79.642 PAIN OF LEFT HAND: Primary | ICD-10-CM

## 2020-10-12 DIAGNOSIS — M25.531 RIGHT WRIST PAIN: ICD-10-CM

## 2020-10-12 DIAGNOSIS — Z01.419 ENCOUNTER FOR GYNECOLOGICAL EXAMINATION WITHOUT ABNORMAL FINDING: Primary | ICD-10-CM

## 2020-10-12 DIAGNOSIS — S52.501D CLOSED FRACTURE OF DISTAL END OF RIGHT RADIUS WITH ROUTINE HEALING, UNSPECIFIED FRACTURE MORPHOLOGY, SUBSEQUENT ENCOUNTER: Primary | ICD-10-CM

## 2020-10-12 PROCEDURE — 88175 CYTOPATH C/V AUTO FLUID REDO: CPT

## 2020-10-12 PROCEDURE — 73110 X-RAY EXAM OF WRIST: CPT | Mod: TC,RT

## 2020-10-12 PROCEDURE — 3008F PR BODY MASS INDEX (BMI) DOCUMENTED: ICD-10-PCS | Mod: CPTII,S$GLB,, | Performed by: NURSE PRACTITIONER

## 2020-10-12 PROCEDURE — 99999 PR PBB SHADOW E&M-EST. PATIENT-LVL IV: ICD-10-PCS | Mod: PBBFAC,,, | Performed by: PHYSICIAN ASSISTANT

## 2020-10-12 PROCEDURE — 99999 PR PBB SHADOW E&M-EST. PATIENT-LVL III: ICD-10-PCS | Mod: PBBFAC,,, | Performed by: NURSE PRACTITIONER

## 2020-10-12 PROCEDURE — 3008F BODY MASS INDEX DOCD: CPT | Mod: CPTII,S$GLB,, | Performed by: NURSE PRACTITIONER

## 2020-10-12 PROCEDURE — 3008F BODY MASS INDEX DOCD: CPT | Mod: CPTII,S$GLB,, | Performed by: PHYSICIAN ASSISTANT

## 2020-10-12 PROCEDURE — 99396 PREV VISIT EST AGE 40-64: CPT | Mod: S$GLB,,, | Performed by: NURSE PRACTITIONER

## 2020-10-12 PROCEDURE — 3008F PR BODY MASS INDEX (BMI) DOCUMENTED: ICD-10-PCS | Mod: CPTII,S$GLB,, | Performed by: PHYSICIAN ASSISTANT

## 2020-10-12 PROCEDURE — 99999 PR PBB SHADOW E&M-EST. PATIENT-LVL III: CPT | Mod: PBBFAC,,, | Performed by: NURSE PRACTITIONER

## 2020-10-12 PROCEDURE — 99214 OFFICE O/P EST MOD 30 MIN: CPT | Mod: S$GLB,,, | Performed by: PHYSICIAN ASSISTANT

## 2020-10-12 PROCEDURE — 87624 HPV HI-RISK TYP POOLED RSLT: CPT

## 2020-10-12 PROCEDURE — 73110 XR WRIST COMPLETE 3 VIEWS RIGHT: ICD-10-PCS | Mod: 26,RT,, | Performed by: RADIOLOGY

## 2020-10-12 PROCEDURE — 99999 PR PBB SHADOW E&M-EST. PATIENT-LVL IV: CPT | Mod: PBBFAC,,, | Performed by: PHYSICIAN ASSISTANT

## 2020-10-12 PROCEDURE — 99396 PR PREVENTIVE VISIT,EST,40-64: ICD-10-PCS | Mod: S$GLB,,, | Performed by: NURSE PRACTITIONER

## 2020-10-12 PROCEDURE — 99214 PR OFFICE/OUTPT VISIT, EST, LEVL IV, 30-39 MIN: ICD-10-PCS | Mod: S$GLB,,, | Performed by: PHYSICIAN ASSISTANT

## 2020-10-12 PROCEDURE — 73110 X-RAY EXAM OF WRIST: CPT | Mod: 26,RT,, | Performed by: RADIOLOGY

## 2020-10-12 NOTE — PROGRESS NOTES
CC: Well woman exam    Jaylyn May is a 64 y.o. female  presents for well woman exam.  LMP: No LMP recorded. Patient is postmenopausal..  No issues, problems, or complaints.    MMG in November.     Past Medical History:   Diagnosis Date    Abnormal Pap smear     over 15 years     Pituitary cyst     Supraventricular tachycardia      Past Surgical History:   Procedure Laterality Date     SECTION      MANDIBLE FRACTURE SURGERY       Social History     Socioeconomic History    Marital status:      Spouse name: Not on file    Number of children: Not on file    Years of education: Not on file    Highest education level: Not on file   Occupational History    Not on file   Social Needs    Financial resource strain: Not on file    Food insecurity     Worry: Not on file     Inability: Not on file    Transportation needs     Medical: Not on file     Non-medical: Not on file   Tobacco Use    Smoking status: Never Smoker    Smokeless tobacco: Never Used   Substance and Sexual Activity    Alcohol use: Yes     Alcohol/week: 7.0 standard drinks     Types: 7 Glasses of wine per week     Comment: socally     Drug use: No    Sexual activity: Yes     Partners: Male     Birth control/protection: None, Post-menopausal   Lifestyle    Physical activity     Days per week: Not on file     Minutes per session: Not on file    Stress: Not on file   Relationships    Social connections     Talks on phone: Not on file     Gets together: Not on file     Attends Adventism service: Not on file     Active member of club or organization: Not on file     Attends meetings of clubs or organizations: Not on file     Relationship status: Not on file   Other Topics Concern    Not on file   Social History Narrative    Wears a seatbelt.     Family History   Problem Relation Age of Onset    Hypertension Mother     Cataracts Mother     Blindness Maternal Aunt     Hypertension Maternal Aunt     Cataracts  Maternal Aunt     Breast cancer Sister     Cancer Maternal Grandmother     Breast cancer Maternal Grandmother     Leukemia Paternal Aunt      OB History        2    Para   2    Term   2            AB        Living   2       SAB        TAB        Ectopic        Multiple        Live Births                     /76   Ht 5' (1.524 m)   Wt 60.2 kg (132 lb 11.5 oz)   BMI 25.92 kg/m²       ROS:  GENERAL: Denies weight gain or weight loss. Feeling well overall.   SKIN: Denies rash or lesions.   HEAD: Denies head injury or headache.   NODES: Denies enlarged lymph nodes.   CHEST: Denies chest pain or shortness of breath.   CARDIOVASCULAR: Denies palpitations or left sided chest pain.   ABDOMEN: No abdominal pain, constipation, diarrhea, nausea, vomiting or rectal bleeding.   URINARY: No frequency, dysuria, hematuria, or burning on urination.  REPRODUCTIVE: See HPI.   BREASTS: The patient performs breast self-examination and denies pain, lumps, or nipple discharge.   HEMATOLOGIC: No easy bruisability or excessive bleeding.   MUSCULOSKELETAL: Denies joint pain or swelling.   NEUROLOGIC: Denies syncope or weakness.   PSYCHIATRIC: Denies depression, anxiety or mood swings.    PHYSICAL EXAM:  APPEARANCE: Well nourished, well developed, in no acute distress.  AFFECT: WNL, alert and oriented x 3  SKIN: No acne or hirsutism  NECK: Neck symmetric without masses or thyromegaly  NODES: No inguinal, cervical, axillary, or femoral lymph node enlargement  CHEST: Good respiratory effect  ABDOMEN: Soft.  No tenderness or masses.  No hepatosplenomegaly.  No hernias.  BREASTS: Symmetrical, no skin changes or visible lesions.  No palpable masses, nipple discharge bilaterally.  PELVIC: Normal external genitalia without lesions.  Normal hair distribution.  Adequate perineal body, normal urethral meatus.  Vagina atrophic without lesions or discharge.  Cervix stenotic pink, without lesions, discharge or tenderness.  No  significant cystocele or rectocele.  Bimanual exam shows uterus to be normal size, regular, mobile and nontender.  Adnexa without masses or tenderness.    EXTREMITIES: No edema.  Physical Exam    1. Encounter for gynecological examination without abnormal finding  Liquid-Based Pap Smear, Screening    HPV High Risk Genotypes, PCR    AND PLAN:    Patient was counseled today on A.C.S. Pap guidelines and recommendations for yearly pelvic exams, mammograms and monthly self breast exams; to see her PCP for other health maintenance.

## 2020-10-12 NOTE — PATIENT INSTRUCTIONS

## 2020-10-12 NOTE — PROGRESS NOTES
Patient ID: Jaylyn May is a 64 y.o. female.    Chief Complaint: Pain of the Right Wrist      HPI: Jaylyn May  is a 64 y.o. female who c/o Pain of the Right Wrist   for duration of about 7 weeks.  I have been treating her non operatively for right distal radius fracture.  She comes today for routine follow-up.  Pain level is 4/10 in severity.  Quality is aching.  It is intermittent.  She complains of associated stiffness.  Improved with immobilization and rest.  Aggravating factors include wrist flexion and weight-bearing.  Incidentally, she has new complaint of pain in the left hand.  She feels better when she soaks in warm water.  It is worsened 1st thing in the morning when she wakes up.  She complains of associated stiffness.  It is more of a discomfort.  Mild to moderate in severity at times.  No associated numbness or tingling    Past Medical History:   Diagnosis Date    Abnormal Pap smear     over 15 years     Pituitary cyst     Supraventricular tachycardia      Past Surgical History:   Procedure Laterality Date     SECTION      MANDIBLE FRACTURE SURGERY       Family History   Problem Relation Age of Onset    Hypertension Mother     Cataracts Mother     Blindness Maternal Aunt     Hypertension Maternal Aunt     Cataracts Maternal Aunt     Breast cancer Sister     Cancer Maternal Grandmother     Breast cancer Maternal Grandmother     Leukemia Paternal Aunt      Social History     Socioeconomic History    Marital status:      Spouse name: Not on file    Number of children: Not on file    Years of education: Not on file    Highest education level: Not on file   Occupational History    Not on file   Social Needs    Financial resource strain: Not on file    Food insecurity     Worry: Not on file     Inability: Not on file    Transportation needs     Medical: Not on file     Non-medical: Not on file   Tobacco Use    Smoking status: Never Smoker    Smokeless  tobacco: Never Used   Substance and Sexual Activity    Alcohol use: Yes     Alcohol/week: 7.0 standard drinks     Types: 7 Glasses of wine per week     Comment: socally     Drug use: No    Sexual activity: Yes     Partners: Male     Birth control/protection: None, Post-menopausal   Lifestyle    Physical activity     Days per week: Not on file     Minutes per session: Not on file    Stress: Not on file   Relationships    Social connections     Talks on phone: Not on file     Gets together: Not on file     Attends Evangelical service: Not on file     Active member of club or organization: Not on file     Attends meetings of clubs or organizations: Not on file     Relationship status: Not on file   Other Topics Concern    Not on file   Social History Narrative    Wears a seatbelt.     Medication List with Changes/Refills   Current Medications    ASPIRIN (ECOTRIN) 81 MG EC TABLET    Take 81 mg by mouth once daily.    BIOTIN ORAL    Take 1 tablet by mouth once daily.     CETIRIZINE (ZYRTEC) 10 MG TABLET    Take 10 mg by mouth as needed.     DIPHENHYDRAMINE-ACETAMINOPHEN (TYLENOL PM)  MG TAB    Take 1 tablet by mouth nightly.    GARLIC EXTRACT ORAL    Take 1 capsule by mouth once daily.    IBANDRONATE (BONIVA) 150 MG TABLET    take one tablet by mouth every 30 days as directed    LORAZEPAM (ATIVAN) 1 MG TABLET    Take one tablet by mouth every 6 (six) hours as needed.    MELATONIN 10 MG TAB    Take 20 mg by mouth every evening.    METOPROLOL SUCCINATE (TOPROL-XL) 25 MG 24 HR TABLET    TAKE 1/2 TABLET ONCE DAILY    RIZATRIPTAN (MAXALT) 10 MG TABLET    Take 10 mg by mouth as needed for Migraine.    SIMVASTATIN (ZOCOR) 20 MG TABLET    Take 1 tablet by mouth once daily.    VENLAFAXINE (EFFEXOR-XR) 75 MG 24 HR CAPSULE    Take 75 mg by mouth 2 (two) times daily.      Review of patient's allergies indicates:   Allergen Reactions    Penicillins      rash       Objective:        General    Nursing note and vitals  reviewed.  Constitutional: She is oriented to person, place, and time. She appears well-developed and well-nourished.   HENT:   Head: Normocephalic and atraumatic.   Eyes: EOM are normal.   Cardiovascular: Normal rate and regular rhythm.    Pulmonary/Chest: Effort normal.   Abdominal: Soft.   Neurological: She is alert and oriented to person, place, and time.   Psychiatric: She has a normal mood and affect. Her behavior is normal.             Right Hand/Wrist Exam     Tests     Atrophy   Thenar:  negative  Hypothenar:  negative  Intrinsic:  negative  1st Dorsal Interosseous: negative    Other     Neuorologic Exam    Median Distribution: normal  Ulnar Distribution: normal  Radial Distribution: normal    Comments:  2+ radial pulse  Compartments soft  No TTP DR - much improved  Swelling resolved  Able to make a full fist and wiggle fingers  Sensation intact  Pain free A/PROM  Full pron/sup  Decreased flex/ext        Left Hand/Wrist Exam     Inspection   Scars: Wrist - absent Hand -  absent  Effusion: Wrist - absent Hand -  absent  Bruising: Wrist - absent Hand -  absent  Deformity: Wrist - absent Hand -  absent    Range of Motion     Wrist   Extension: normal   Flexion: normal   Pronation: normal   Supination: normal     Tests   Phalens Sign: negative  Tinel's sign (median nerve): negative  Finkelstein's test: negative    Atrophy  Thenar:  Negative  Hypothenar:  negative  Intrinsic: negative  1st Dorsal Interosseous:  negative    Other     Sensory Exam  Median Distribution: normal  Ulnar Distribution: normal  Radial Distribution: normal    Comments:  2+ radial pulse  Crepitus 1st CMC jt      Left Elbow Exam     Tests   Tinel's sign (cubital tunnel): negative        Muscle Strength   Right Upper Extremity   : 4/5   Left Upper Extremity  Wrist extension: 5/5   Wrist flexion: 5/5   :  4/5   Pinch Mechanism: 4/5    Vascular Exam       Capillary Refill  Right Hand: normal capillary refill  Left Hand: normal  capillary refill      Xray Images and report were reviewed today.  I agree with the radiologist's interpretation.    X-Ray Wrist Complete Right  Narrative: EXAMINATION:  XR WRIST COMPLETE 3 VIEWS RIGHT    CLINICAL HISTORY:  Pain in right wrist    TECHNIQUE:  PA, lateral, and oblique views of the right wrist were performed.    COMPARISON:  Right wrist radiographs September 10, 2020    FINDINGS:  Again noted is a healing fracture of the distal right radius with unchanged alignment of the fracture fragments.  Radiocarpal alignment is maintained.  No new fracture identified.  No acute soft tissue abnormality identified.  Impression: Healing fracture of the distal right radius.    Electronically signed by: Pepito Bell  Date:    10/12/2020  Time:    14:59        Assessment:       Encounter Diagnoses   Name Primary?    Closed fracture of distal end of right radius with routine healing, unspecified fracture morphology, subsequent encounter Yes    Right wrist pain           Plan:       Jaylyn was seen today for pain.    Diagnoses and all orders for this visit:    Closed fracture of distal end of right radius with routine healing, unspecified fracture morphology, subsequent encounter  -     Ambulatory referral/consult to Physical/Occupational Therapy; Future    Right wrist pain  -     Ambulatory referral/consult to Physical/Occupational Therapy; Future        Jaylyn TOMMY May is an established pt who comes in today for follow-up on the above.  She is doing much better.  I would recommend she discontinue her splint.  She is to still avoid heavy lifting.  I would have her start with occupational therapy to work on flexion and extension.  She may work on gentle strengthening.  She should continue to avoid heavy lifting.  I will see her back in the office in 6 weeks with new x-rays.  I will also have her get x-ray of the left hand at follow-up for further evaluation.  Based on clinical exam, I do not think there is any  arthritis of the base of the thumb contributing to her symptoms presently.  However, she is feeling pretty good today.  If x-rays do show arthritic changes, she may benefit from injections down the line.  She verbalizes understanding and agrees.    Follow up in about 6 weeks (around 11/23/2020) for xray right wrist and xray left hand (new prob per pt request).    The patient understands, chooses and consents to this plan and accepts all   the risks which include but are not limited to the risks mentioned above.     Disclaimer: This note was prepared using a voice recognition system and is likely to have sound alike errors within the text.

## 2020-10-27 LAB
HPV HR 12 DNA SPEC QL NAA+PROBE: NEGATIVE
HPV16 AG SPEC QL: NEGATIVE
HPV18 DNA SPEC QL NAA+PROBE: NEGATIVE

## 2020-11-07 LAB
FINAL PATHOLOGIC DIAGNOSIS: NORMAL
Lab: NORMAL

## 2020-11-09 ENCOUNTER — PATIENT MESSAGE (OUTPATIENT)
Dept: OBSTETRICS AND GYNECOLOGY | Facility: CLINIC | Age: 65
End: 2020-11-09

## 2020-11-30 ENCOUNTER — HOSPITAL ENCOUNTER (OUTPATIENT)
Dept: RADIOLOGY | Facility: HOSPITAL | Age: 65
Discharge: HOME | End: 2020-11-30
Attending: PHYSICIAN ASSISTANT
Payer: COMMERCIAL

## 2020-11-30 ENCOUNTER — OFFICE VISIT (OUTPATIENT)
Dept: ORTHOPEDICS | Facility: CLINIC | Age: 65
End: 2020-11-30
Payer: COMMERCIAL

## 2020-11-30 ENCOUNTER — HOSPITAL ENCOUNTER (OUTPATIENT)
Dept: RADIOLOGY | Facility: HOSPITAL | Age: 65
Discharge: HOME | End: 2020-11-30
Attending: NURSE PRACTITIONER
Payer: COMMERCIAL

## 2020-11-30 VITALS
BODY MASS INDEX: 25.73 KG/M2 | WEIGHT: 131.06 LBS | RESPIRATION RATE: 18 BRPM | HEART RATE: 73 BPM | HEIGHT: 60 IN | DIASTOLIC BLOOD PRESSURE: 61 MMHG | SYSTOLIC BLOOD PRESSURE: 102 MMHG

## 2020-11-30 VITALS — BODY MASS INDEX: 25.93 KG/M2 | WEIGHT: 132.06 LBS | HEIGHT: 60 IN

## 2020-11-30 DIAGNOSIS — M25.531 RIGHT WRIST PAIN: ICD-10-CM

## 2020-11-30 DIAGNOSIS — M79.642 PAIN OF LEFT HAND: ICD-10-CM

## 2020-11-30 DIAGNOSIS — Z12.31 BREAST CANCER SCREENING BY MAMMOGRAM: ICD-10-CM

## 2020-11-30 DIAGNOSIS — S52.501D CLOSED FRACTURE OF DISTAL END OF RIGHT RADIUS WITH ROUTINE HEALING, UNSPECIFIED FRACTURE MORPHOLOGY, SUBSEQUENT ENCOUNTER: Primary | ICD-10-CM

## 2020-11-30 PROCEDURE — 73110 XR WRIST COMPLETE 3 VIEWS RIGHT: ICD-10-PCS | Mod: 26,RT,, | Performed by: RADIOLOGY

## 2020-11-30 PROCEDURE — 77063 BREAST TOMOSYNTHESIS BI: CPT | Mod: 26,,, | Performed by: RADIOLOGY

## 2020-11-30 PROCEDURE — 1126F AMNT PAIN NOTED NONE PRSNT: CPT | Mod: S$GLB,,, | Performed by: PHYSICIAN ASSISTANT

## 2020-11-30 PROCEDURE — 77067 MAMMO DIGITAL SCREENING BILAT WITH TOMO: ICD-10-PCS | Mod: 26,,, | Performed by: RADIOLOGY

## 2020-11-30 PROCEDURE — 77067 SCR MAMMO BI INCL CAD: CPT | Mod: 26,,, | Performed by: RADIOLOGY

## 2020-11-30 PROCEDURE — 77063 MAMMO DIGITAL SCREENING BILAT WITH TOMO: ICD-10-PCS | Mod: 26,,, | Performed by: RADIOLOGY

## 2020-11-30 PROCEDURE — 3008F BODY MASS INDEX DOCD: CPT | Mod: CPTII,S$GLB,, | Performed by: PHYSICIAN ASSISTANT

## 2020-11-30 PROCEDURE — 99213 PR OFFICE/OUTPT VISIT, EST, LEVL III, 20-29 MIN: ICD-10-PCS | Mod: S$GLB,,, | Performed by: PHYSICIAN ASSISTANT

## 2020-11-30 PROCEDURE — 73110 X-RAY EXAM OF WRIST: CPT | Mod: TC,RT

## 2020-11-30 PROCEDURE — 99999 PR PBB SHADOW E&M-EST. PATIENT-LVL III: ICD-10-PCS | Mod: PBBFAC,,, | Performed by: PHYSICIAN ASSISTANT

## 2020-11-30 PROCEDURE — 99999 PR PBB SHADOW E&M-EST. PATIENT-LVL III: CPT | Mod: PBBFAC,,, | Performed by: PHYSICIAN ASSISTANT

## 2020-11-30 PROCEDURE — 99213 OFFICE O/P EST LOW 20 MIN: CPT | Mod: S$GLB,,, | Performed by: PHYSICIAN ASSISTANT

## 2020-11-30 PROCEDURE — 1126F PR PAIN SEVERITY QUANTIFIED, NO PAIN PRESENT: ICD-10-PCS | Mod: S$GLB,,, | Performed by: PHYSICIAN ASSISTANT

## 2020-11-30 PROCEDURE — 77067 SCR MAMMO BI INCL CAD: CPT | Mod: TC

## 2020-11-30 PROCEDURE — 73110 X-RAY EXAM OF WRIST: CPT | Mod: 26,RT,, | Performed by: RADIOLOGY

## 2020-11-30 PROCEDURE — 3008F PR BODY MASS INDEX (BMI) DOCUMENTED: ICD-10-PCS | Mod: CPTII,S$GLB,, | Performed by: PHYSICIAN ASSISTANT

## 2020-11-30 NOTE — PROGRESS NOTES
Patient ID: Jaylyn May is a 65 y.o. female.    Chief Complaint: Pain of the Right Wrist      HPI: Jaylyn May  is a 65 y.o. female who c/o Pain of the Right Wrist   for duration of   More than 3 months now.  I treated her for right distal radius fracture.  She comes in today for routine follow-up and repeat x-rays.  Pain level is 0/10.  Functionally she is doing just about everything she wants to.  She is still working with physical therapy and has 3 or 4 more sessions.  She would like to know if she needs to continue.  She tells me the therapist has advise continuing to work on strengthening.  Quality is none.  Alleviating factors include time and therapy.  Aggravating factors include nothing in particular.    Past Medical History:   Diagnosis Date    Abnormal Pap smear     over 15 years     Pituitary cyst     Supraventricular tachycardia      Past Surgical History:   Procedure Laterality Date     SECTION      MANDIBLE FRACTURE SURGERY       Family History   Problem Relation Age of Onset    Hypertension Mother     Cataracts Mother     Blindness Maternal Aunt     Hypertension Maternal Aunt     Cataracts Maternal Aunt     Breast cancer Sister     Cancer Maternal Grandmother     Breast cancer Maternal Grandmother     Leukemia Paternal Aunt      Social History     Socioeconomic History    Marital status:      Spouse name: Not on file    Number of children: Not on file    Years of education: Not on file    Highest education level: Not on file   Occupational History    Not on file   Social Needs    Financial resource strain: Not on file    Food insecurity     Worry: Not on file     Inability: Not on file    Transportation needs     Medical: Not on file     Non-medical: Not on file   Tobacco Use    Smoking status: Never Smoker    Smokeless tobacco: Never Used   Substance and Sexual Activity    Alcohol use: Yes     Alcohol/week: 7.0 standard drinks     Types: 7 Glasses of  wine per week     Comment: socally     Drug use: No    Sexual activity: Yes     Partners: Male     Birth control/protection: None, Post-menopausal   Lifestyle    Physical activity     Days per week: Not on file     Minutes per session: Not on file    Stress: Not on file   Relationships    Social connections     Talks on phone: Not on file     Gets together: Not on file     Attends Anabaptism service: Not on file     Active member of club or organization: Not on file     Attends meetings of clubs or organizations: Not on file     Relationship status: Not on file   Other Topics Concern    Not on file   Social History Narrative    Wears a seatbelt.     Medication List with Changes/Refills   Current Medications    ASPIRIN (ECOTRIN) 81 MG EC TABLET    Take 81 mg by mouth once daily.    BIOTIN ORAL    Take 1 tablet by mouth once daily.     CETIRIZINE (ZYRTEC) 10 MG TABLET    Take 10 mg by mouth as needed.     DIPHENHYDRAMINE-ACETAMINOPHEN (TYLENOL PM)  MG TAB    Take 1 tablet by mouth nightly.    GARLIC EXTRACT ORAL    Take 1 capsule by mouth once daily.    IBANDRONATE (BONIVA) 150 MG TABLET    take one tablet by mouth every 30 days as directed    LORAZEPAM (ATIVAN) 1 MG TABLET    Take one tablet by mouth every 6 (six) hours as needed.    MELATONIN 10 MG TAB    Take 20 mg by mouth every evening.    METOPROLOL SUCCINATE (TOPROL-XL) 25 MG 24 HR TABLET    TAKE 1/2 TABLET ONCE DAILY    RIZATRIPTAN (MAXALT) 10 MG TABLET    Take 10 mg by mouth as needed for Migraine.    SIMVASTATIN (ZOCOR) 20 MG TABLET    Take 1 tablet by mouth once daily.    VENLAFAXINE (EFFEXOR-XR) 75 MG 24 HR CAPSULE    Take 75 mg by mouth 2 (two) times daily.      Review of patient's allergies indicates:   Allergen Reactions    Penicillins      rash       Objective:        General    Nursing note and vitals reviewed.  Constitutional: She is oriented to person, place, and time. She appears well-developed and well-nourished.   HENT:   Head:  Normocephalic and atraumatic.   Eyes: EOM are normal.   Cardiovascular: Normal rate and regular rhythm.    Pulmonary/Chest: Effort normal.   Neurological: She is alert and oriented to person, place, and time.   Psychiatric: She has a normal mood and affect. Her behavior is normal.             Right Hand/Wrist Exam     Tests     Atrophy   Thenar:  negative  Hypothenar:  negative  Intrinsic:  negative  1st Dorsal Interosseous: negative    Other     Neuorologic Exam    Median Distribution: normal  Ulnar Distribution: normal  Radial Distribution: normal    Comments:  2+ radial pulse  Compartments soft  No TTP DR - much improved  Swelling resolved  Able to make a full fist and wiggle fingers  Sensation intact  Pain free A/PROM  Full pron/sup  Decreased flex/ext        Left Hand/Wrist Exam     Range of Motion     Wrist   Extension: normal   Flexion: normal   Pronation: normal   Supination: normal           Muscle Strength   Right Upper Extremity   : 4/5   Left Upper Extremity  Wrist extension: 5/5   Wrist flexion: 5/5   :  4/5     Vascular Exam       Capillary Refill  Right Hand: normal capillary refill  Left Hand: normal capillary refill      Xray Images and report were reviewed today.  I agree with the radiologist's interpretation.    X-Ray Wrist Complete Right  Narrative: EXAMINATION:  XR WRIST COMPLETE 3 VIEWS RIGHT    CLINICAL HISTORY:  Pain in right wrist    TECHNIQUE:  PA, lateral, and oblique views of the right wrist were performed.    COMPARISON:  10/12/2020    FINDINGS:  Healing fracture of the distal right radius noted with fracture fragments in unchanged position and alignment.  Stable mild widening at the scapholunate interval.  No new abnormality or detrimental change.  Impression: As above    Electronically signed by: Josesito Foreman MD  Date:    11/30/2020  Time:    14:09        Assessment:       Encounter Diagnosis   Name Primary?    Closed fracture of distal end of right radius with routine  healing, unspecified fracture morphology, subsequent encounter Yes          Plan:       Jaylyn was seen today for pain.    Diagnoses and all orders for this visit:    Closed fracture of distal end of right radius with routine healing, unspecified fracture morphology, subsequent encounter        Jaylyn May is an established pt who comes in today for   Routine follow-up on the above.  X-rays show good healing callus formation about the distal radius fracture.  She has no tenderness to palpation on exam.  She has improving strength.  As far as therapy is concerned, I think she will continue to get stronger through every day activities.  I would be okay with them progressing her to a home exercise program to continue strengthening on her own.  She is interested in doing that.  If she changes her mind and would like continued therapy, be happy to submit a new order.  She will let me know.  Otherwise she can follow up with me p.r.n..  She verbalized understanding and agreed      Follow up if symptoms worsen or fail to improve.    The patient understands, chooses and consents to this plan and accepts all   the risks which include but are not limited to the risks mentioned above.     Disclaimer: This note was prepared using a voice recognition system and is likely to have sound alike errors within the text.

## 2021-03-10 DIAGNOSIS — I47.10 PAROXYSMAL SUPRAVENTRICULAR TACHYCARDIA: Primary | ICD-10-CM

## 2021-03-30 DIAGNOSIS — Z76.89 ESTABLISHING CARE WITH NEW DOCTOR, ENCOUNTER FOR: Primary | ICD-10-CM

## 2021-04-22 ENCOUNTER — HOSPITAL ENCOUNTER (OUTPATIENT)
Dept: CARDIOLOGY | Facility: HOSPITAL | Age: 66
Discharge: HOME | End: 2021-04-22
Attending: INTERNAL MEDICINE
Payer: COMMERCIAL

## 2021-04-22 ENCOUNTER — OFFICE VISIT (OUTPATIENT)
Dept: CARDIOLOGY | Facility: CLINIC | Age: 66
End: 2021-04-22
Payer: COMMERCIAL

## 2021-04-22 VITALS
HEIGHT: 60 IN | OXYGEN SATURATION: 99 % | DIASTOLIC BLOOD PRESSURE: 74 MMHG | WEIGHT: 127.13 LBS | BODY MASS INDEX: 24.96 KG/M2 | SYSTOLIC BLOOD PRESSURE: 126 MMHG | HEART RATE: 82 BPM | RESPIRATION RATE: 16 BRPM

## 2021-04-22 DIAGNOSIS — I47.10 PAROXYSMAL SUPRAVENTRICULAR TACHYCARDIA: Primary | ICD-10-CM

## 2021-04-22 DIAGNOSIS — F41.1 ANXIETY STATE: ICD-10-CM

## 2021-04-22 DIAGNOSIS — G43.809 OTHER MIGRAINE WITHOUT STATUS MIGRAINOSUS, NOT INTRACTABLE: Chronic | ICD-10-CM

## 2021-04-22 DIAGNOSIS — Z76.89 ESTABLISHING CARE WITH NEW DOCTOR, ENCOUNTER FOR: ICD-10-CM

## 2021-04-22 DIAGNOSIS — E78.00 PURE HYPERCHOLESTEROLEMIA: ICD-10-CM

## 2021-04-22 PROCEDURE — 93010 ELECTROCARDIOGRAM REPORT: CPT | Mod: ,,, | Performed by: INTERNAL MEDICINE

## 2021-04-22 PROCEDURE — 99213 OFFICE O/P EST LOW 20 MIN: CPT | Mod: S$GLB,,, | Performed by: INTERNAL MEDICINE

## 2021-04-22 PROCEDURE — 99999 PR PBB SHADOW E&M-EST. PATIENT-LVL III: ICD-10-PCS | Mod: PBBFAC,,, | Performed by: INTERNAL MEDICINE

## 2021-04-22 PROCEDURE — 93010 EKG 12-LEAD: ICD-10-PCS | Mod: ,,, | Performed by: INTERNAL MEDICINE

## 2021-04-22 PROCEDURE — 99213 PR OFFICE/OUTPT VISIT, EST, LEVL III, 20-29 MIN: ICD-10-PCS | Mod: S$GLB,,, | Performed by: INTERNAL MEDICINE

## 2021-04-22 PROCEDURE — 1126F AMNT PAIN NOTED NONE PRSNT: CPT | Mod: S$GLB,,, | Performed by: INTERNAL MEDICINE

## 2021-04-22 PROCEDURE — 99999 PR PBB SHADOW E&M-EST. PATIENT-LVL III: CPT | Mod: PBBFAC,,, | Performed by: INTERNAL MEDICINE

## 2021-04-22 PROCEDURE — 3008F PR BODY MASS INDEX (BMI) DOCUMENTED: ICD-10-PCS | Mod: CPTII,S$GLB,, | Performed by: INTERNAL MEDICINE

## 2021-04-22 PROCEDURE — 1126F PR PAIN SEVERITY QUANTIFIED, NO PAIN PRESENT: ICD-10-PCS | Mod: S$GLB,,, | Performed by: INTERNAL MEDICINE

## 2021-04-22 PROCEDURE — 93005 ELECTROCARDIOGRAM TRACING: CPT

## 2021-04-22 PROCEDURE — 3008F BODY MASS INDEX DOCD: CPT | Mod: CPTII,S$GLB,, | Performed by: INTERNAL MEDICINE

## 2021-05-14 ENCOUNTER — HOSPITAL ENCOUNTER (OUTPATIENT)
Dept: CARDIOLOGY | Facility: HOSPITAL | Age: 66
Discharge: HOME | End: 2021-05-14
Attending: INTERNAL MEDICINE
Payer: COMMERCIAL

## 2021-05-14 VITALS — WEIGHT: 127 LBS | HEIGHT: 60 IN | BODY MASS INDEX: 24.94 KG/M2

## 2021-05-14 DIAGNOSIS — I47.10 PAROXYSMAL SUPRAVENTRICULAR TACHYCARDIA: ICD-10-CM

## 2021-05-14 LAB
AORTIC ROOT ANNULUS: 2.12 CM
AV INDEX (PROSTH): 0.84
AV MEAN GRADIENT: 5 MMHG
AV PEAK GRADIENT: 9 MMHG
AV VALVE AREA: 2.57 CM2
AV VELOCITY RATIO: 0.87
BSA FOR ECHO PROCEDURE: 1.56 M2
CV ECHO LV RWT: 0.38 CM
DOP CALC AO PEAK VEL: 1.51 M/S
DOP CALC AO VTI: 34.75 CM
DOP CALC LVOT AREA: 3 CM2
DOP CALC LVOT DIAMETER: 1.97 CM
DOP CALC LVOT PEAK VEL: 1.31 M/S
DOP CALC LVOT STROKE VOLUME: 89.32 CM3
DOP CALCLVOT PEAK VEL VTI: 29.32 CM
E WAVE DECELERATION TIME: 285.82 MSEC
E/A RATIO: 1.03
E/E' RATIO: 6.73 M/S
ECHO LV POSTERIOR WALL: 0.82 CM (ref 0.6–1.1)
EJECTION FRACTION: 60 %
FRACTIONAL SHORTENING: 38 % (ref 28–44)
INTERVENTRICULAR SEPTUM: 0.8 CM (ref 0.6–1.1)
IVRT: 88.49 MSEC
LA MAJOR: 3.8 CM
LA MINOR: 3.78 CM
LA WIDTH: 2.87 CM
LEFT ATRIUM SIZE: 2.44 CM
LEFT ATRIUM VOLUME INDEX: 14.6 ML/M2
LEFT ATRIUM VOLUME: 22.56 CM3
LEFT INTERNAL DIMENSION IN SYSTOLE: 2.68 CM (ref 2.1–4)
LEFT VENTRICLE DIASTOLIC VOLUME INDEX: 53.77 ML/M2
LEFT VENTRICLE DIASTOLIC VOLUME: 82.81 ML
LEFT VENTRICLE MASS INDEX: 69 G/M2
LEFT VENTRICLE SYSTOLIC VOLUME INDEX: 17.2 ML/M2
LEFT VENTRICLE SYSTOLIC VOLUME: 26.51 ML
LEFT VENTRICULAR INTERNAL DIMENSION IN DIASTOLE: 4.29 CM (ref 3.5–6)
LEFT VENTRICULAR MASS: 106.66 G
LV LATERAL E/E' RATIO: 5.69 M/S
LV SEPTAL E/E' RATIO: 8.22 M/S
MV A" WAVE DURATION": 9.99 MSEC
MV PEAK A VEL: 0.72 M/S
MV PEAK E VEL: 0.74 M/S
PISA TR MAX VEL: 2.75 M/S
PULM VEIN S/D RATIO: 1.59
PV PEAK D VEL: 0.37 M/S
PV PEAK S VEL: 0.59 M/S
PV PEAK VELOCITY: 0.73 CM/S
RA MAJOR: 4.11 CM
RA PRESSURE: 3 MMHG
RA WIDTH: 3.15 CM
RIGHT VENTRICULAR END-DIASTOLIC DIMENSION: 2.61 CM
SINUS: 2.29 CM
STJ: 2.37 CM
TDI LATERAL: 0.13 M/S
TDI SEPTAL: 0.09 M/S
TDI: 0.11 M/S
TR MAX PG: 30 MMHG
TRICUSPID ANNULAR PLANE SYSTOLIC EXCURSION: 2.1 CM
TV REST PULMONARY ARTERY PRESSURE: 33 MMHG

## 2021-05-14 PROCEDURE — 93306 ECHO (CUPID ONLY): ICD-10-PCS | Mod: 26,,, | Performed by: INTERNAL MEDICINE

## 2021-05-14 PROCEDURE — 93306 TTE W/DOPPLER COMPLETE: CPT | Mod: 26,,, | Performed by: INTERNAL MEDICINE

## 2021-05-14 PROCEDURE — 93306 TTE W/DOPPLER COMPLETE: CPT

## 2021-05-18 ENCOUNTER — PATIENT MESSAGE (OUTPATIENT)
Dept: CARDIOLOGY | Facility: CLINIC | Age: 66
End: 2021-05-18

## 2021-10-18 ENCOUNTER — PATIENT MESSAGE (OUTPATIENT)
Dept: OPHTHALMOLOGY | Facility: CLINIC | Age: 66
End: 2021-10-18
Payer: MEDICARE

## 2021-10-19 ENCOUNTER — APPOINTMENT (OUTPATIENT)
Dept: RADIOLOGY | Facility: HOSPITAL | Age: 66
End: 2021-10-19
Attending: FAMILY MEDICINE
Payer: MEDICARE

## 2021-10-19 DIAGNOSIS — Z13.820 SCREENING FOR OSTEOPOROSIS: ICD-10-CM

## 2021-10-19 PROCEDURE — 77080 DEXA BONE DENSITY SPINE HIP: ICD-10-PCS | Mod: 26,,, | Performed by: RADIOLOGY

## 2021-10-19 PROCEDURE — 77080 DXA BONE DENSITY AXIAL: CPT | Mod: 26,,, | Performed by: RADIOLOGY

## 2021-10-19 PROCEDURE — 77080 DXA BONE DENSITY AXIAL: CPT | Mod: TC

## 2021-12-14 ENCOUNTER — PATIENT MESSAGE (OUTPATIENT)
Dept: OBSTETRICS AND GYNECOLOGY | Facility: CLINIC | Age: 66
End: 2021-12-14
Payer: MEDICARE

## 2021-12-15 ENCOUNTER — PATIENT MESSAGE (OUTPATIENT)
Dept: OBSTETRICS AND GYNECOLOGY | Facility: CLINIC | Age: 66
End: 2021-12-15
Payer: MEDICARE

## 2021-12-15 DIAGNOSIS — Z12.31 ENCOUNTER FOR SCREENING MAMMOGRAM FOR BREAST CANCER: Primary | ICD-10-CM

## 2022-01-24 ENCOUNTER — HOSPITAL ENCOUNTER (OUTPATIENT)
Dept: RADIOLOGY | Facility: HOSPITAL | Age: 67
Discharge: HOME | End: 2022-01-24
Attending: NURSE PRACTITIONER
Payer: MEDICARE

## 2022-01-24 DIAGNOSIS — Z12.31 ENCOUNTER FOR SCREENING MAMMOGRAM FOR BREAST CANCER: ICD-10-CM

## 2022-01-24 PROCEDURE — 77067 SCR MAMMO BI INCL CAD: CPT | Mod: 26,,, | Performed by: RADIOLOGY

## 2022-01-24 PROCEDURE — 77063 MAMMO DIGITAL SCREENING BILAT WITH TOMO: ICD-10-PCS | Mod: 26,,, | Performed by: RADIOLOGY

## 2022-01-24 PROCEDURE — 77063 BREAST TOMOSYNTHESIS BI: CPT | Mod: TC

## 2022-01-24 PROCEDURE — 77067 SCR MAMMO BI INCL CAD: CPT | Mod: TC

## 2022-01-24 PROCEDURE — 77067 MAMMO DIGITAL SCREENING BILAT WITH TOMO: ICD-10-PCS | Mod: 26,,, | Performed by: RADIOLOGY

## 2022-01-24 PROCEDURE — 77063 BREAST TOMOSYNTHESIS BI: CPT | Mod: 26,,, | Performed by: RADIOLOGY

## 2022-01-27 ENCOUNTER — TELEPHONE (OUTPATIENT)
Dept: OBSTETRICS AND GYNECOLOGY | Facility: CLINIC | Age: 67
End: 2022-01-27
Payer: MEDICARE

## 2022-01-27 NOTE — TELEPHONE ENCOUNTER
Called patient and I could not find documentation showing why anyone called her.  Patient has annual scheduled 2/15 and mammo done.

## 2022-01-27 NOTE — TELEPHONE ENCOUNTER
----- Message from Ramya Colunga sent at 1/27/2022  9:34 AM CST -----  .Type:  Patient Returning Call    Who Called:james   Who Left Message for Patient:   Does the patient know what this is regarding?:   Would the patient rather a call back or a response via [employer]?  Call back   Best Call Back Number: [phone]        Additional Information:      Thanks

## 2022-01-27 NOTE — TELEPHONE ENCOUNTER
----- Message from Ramya Colunga sent at 1/27/2022  9:34 AM CST -----  .Type:  Patient Returning Call    Who Called:james   Who Left Message for Patient:   Does the patient know what this is regarding?:   Would the patient rather a call back or a response via [employer]?  Call back   Best Call Back Number: [phone]        Additional Information:      Thanks

## 2022-02-13 ENCOUNTER — PATIENT MESSAGE (OUTPATIENT)
Dept: OBSTETRICS AND GYNECOLOGY | Facility: CLINIC | Age: 67
End: 2022-02-13
Payer: MEDICARE

## 2022-02-14 ENCOUNTER — PATIENT MESSAGE (OUTPATIENT)
Dept: OBSTETRICS AND GYNECOLOGY | Facility: CLINIC | Age: 67
End: 2022-02-14
Payer: MEDICARE

## 2022-04-26 ENCOUNTER — OFFICE VISIT (OUTPATIENT)
Dept: OBSTETRICS AND GYNECOLOGY | Facility: CLINIC | Age: 67
End: 2022-04-26
Payer: MEDICARE

## 2022-04-26 VITALS
SYSTOLIC BLOOD PRESSURE: 110 MMHG | HEIGHT: 60 IN | BODY MASS INDEX: 24.76 KG/M2 | DIASTOLIC BLOOD PRESSURE: 64 MMHG | WEIGHT: 126.13 LBS

## 2022-04-26 DIAGNOSIS — Z01.419 ENCOUNTER FOR GYNECOLOGICAL EXAMINATION WITHOUT ABNORMAL FINDING: Primary | ICD-10-CM

## 2022-04-26 PROCEDURE — 3074F PR MOST RECENT SYSTOLIC BLOOD PRESSURE < 130 MM HG: ICD-10-PCS | Mod: CPTII,S$GLB,, | Performed by: NURSE PRACTITIONER

## 2022-04-26 PROCEDURE — 3288F FALL RISK ASSESSMENT DOCD: CPT | Mod: CPTII,S$GLB,, | Performed by: NURSE PRACTITIONER

## 2022-04-26 PROCEDURE — 1159F PR MEDICATION LIST DOCUMENTED IN MEDICAL RECORD: ICD-10-PCS | Mod: CPTII,S$GLB,, | Performed by: NURSE PRACTITIONER

## 2022-04-26 PROCEDURE — 1101F PR PT FALLS ASSESS DOC 0-1 FALLS W/OUT INJ PAST YR: ICD-10-PCS | Mod: CPTII,S$GLB,, | Performed by: NURSE PRACTITIONER

## 2022-04-26 PROCEDURE — 1160F RVW MEDS BY RX/DR IN RCRD: CPT | Mod: CPTII,S$GLB,, | Performed by: NURSE PRACTITIONER

## 2022-04-26 PROCEDURE — 1101F PT FALLS ASSESS-DOCD LE1/YR: CPT | Mod: CPTII,S$GLB,, | Performed by: NURSE PRACTITIONER

## 2022-04-26 PROCEDURE — 3288F PR FALLS RISK ASSESSMENT DOCUMENTED: ICD-10-PCS | Mod: CPTII,S$GLB,, | Performed by: NURSE PRACTITIONER

## 2022-04-26 PROCEDURE — 3078F DIAST BP <80 MM HG: CPT | Mod: CPTII,S$GLB,, | Performed by: NURSE PRACTITIONER

## 2022-04-26 PROCEDURE — 1126F AMNT PAIN NOTED NONE PRSNT: CPT | Mod: CPTII,S$GLB,, | Performed by: NURSE PRACTITIONER

## 2022-04-26 PROCEDURE — 3008F PR BODY MASS INDEX (BMI) DOCUMENTED: ICD-10-PCS | Mod: CPTII,S$GLB,, | Performed by: NURSE PRACTITIONER

## 2022-04-26 PROCEDURE — 99999 PR PBB SHADOW E&M-EST. PATIENT-LVL IV: ICD-10-PCS | Mod: PBBFAC,,, | Performed by: NURSE PRACTITIONER

## 2022-04-26 PROCEDURE — 3008F BODY MASS INDEX DOCD: CPT | Mod: CPTII,S$GLB,, | Performed by: NURSE PRACTITIONER

## 2022-04-26 PROCEDURE — 1160F PR REVIEW ALL MEDS BY PRESCRIBER/CLIN PHARMACIST DOCUMENTED: ICD-10-PCS | Mod: CPTII,S$GLB,, | Performed by: NURSE PRACTITIONER

## 2022-04-26 PROCEDURE — 99999 PR PBB SHADOW E&M-EST. PATIENT-LVL IV: CPT | Mod: PBBFAC,,, | Performed by: NURSE PRACTITIONER

## 2022-04-26 PROCEDURE — 1126F PR PAIN SEVERITY QUANTIFIED, NO PAIN PRESENT: ICD-10-PCS | Mod: CPTII,S$GLB,, | Performed by: NURSE PRACTITIONER

## 2022-04-26 PROCEDURE — 87624 HPV HI-RISK TYP POOLED RSLT: CPT | Performed by: NURSE PRACTITIONER

## 2022-04-26 PROCEDURE — 88175 CYTOPATH C/V AUTO FLUID REDO: CPT | Performed by: NURSE PRACTITIONER

## 2022-04-26 PROCEDURE — 1159F MED LIST DOCD IN RCRD: CPT | Mod: CPTII,S$GLB,, | Performed by: NURSE PRACTITIONER

## 2022-04-26 PROCEDURE — 3074F SYST BP LT 130 MM HG: CPT | Mod: CPTII,S$GLB,, | Performed by: NURSE PRACTITIONER

## 2022-04-26 PROCEDURE — 3078F PR MOST RECENT DIASTOLIC BLOOD PRESSURE < 80 MM HG: ICD-10-PCS | Mod: CPTII,S$GLB,, | Performed by: NURSE PRACTITIONER

## 2022-04-26 RX ORDER — VENLAFAXINE HYDROCHLORIDE 150 MG/1
150 CAPSULE, EXTENDED RELEASE ORAL DAILY
COMMUNITY
Start: 2022-03-15

## 2022-04-26 NOTE — PROGRESS NOTES
CC: Well woman exam    Jaylyn May is a 66 y.o. female  presents for well woman exam.  LMP: No LMP recorded. Patient is postmenopausal..  No issues, problems, or complaints.      Past Medical History:   Diagnosis Date    Abnormal Pap smear     over 15 years     Pituitary cyst     Supraventricular tachycardia      Past Surgical History:   Procedure Laterality Date     SECTION      MANDIBLE FRACTURE SURGERY       Social History     Socioeconomic History    Marital status:    Tobacco Use    Smoking status: Never Smoker    Smokeless tobacco: Never Used   Substance and Sexual Activity    Alcohol use: Yes     Alcohol/week: 7.0 standard drinks     Types: 7 Glasses of wine per week     Comment: socally     Drug use: No    Sexual activity: Yes     Partners: Male     Birth control/protection: None, Post-menopausal   Social History Narrative    Wears a seatbelt.     Family History   Problem Relation Age of Onset    Hypertension Mother     Cataracts Mother     Blindness Maternal Aunt     Hypertension Maternal Aunt     Cataracts Maternal Aunt     Breast cancer Sister     Cancer Maternal Grandmother     Breast cancer Maternal Grandmother     Leukemia Paternal Aunt      OB History        2    Para   2    Term   2            AB        Living   2       SAB        IAB        Ectopic        Multiple        Live Births   2                 /65   Ht 5' (1.524 m)   Wt 57.2 kg (126 lb 1.7 oz)   BMI 24.63 kg/m²       ROS:  GENERAL: Denies weight gain or weight loss. Feeling well overall.   SKIN: Denies rash or lesions.   HEAD: Denies head injury or headache.   NODES: Denies enlarged lymph nodes.   CHEST: Denies chest pain or shortness of breath.   CARDIOVASCULAR: Denies palpitations or left sided chest pain.   ABDOMEN: No abdominal pain, constipation, diarrhea, nausea, vomiting or rectal bleeding.   URINARY: No frequency, dysuria, hematuria, or burning on  urination.  REPRODUCTIVE: See HPI.   BREASTS: The patient performs breast self-examination and denies pain, lumps, or nipple discharge.   HEMATOLOGIC: No easy bruisability or excessive bleeding.   MUSCULOSKELETAL: Denies joint pain or swelling.   NEUROLOGIC: Denies syncope or weakness.   PSYCHIATRIC: Denies depression, anxiety or mood swings.    PHYSICAL EXAM:  APPEARANCE: Well nourished, well developed, in no acute distress.  AFFECT: WNL, alert and oriented x 3  SKIN: No acne or hirsutism  NECK: Neck symmetric without masses or thyromegaly  NODES: No inguinal, cervical, axillary, or femoral lymph node enlargement  CHEST: Good respiratory effect  ABDOMEN: Soft.  No tenderness or masses.  No hepatosplenomegaly.  No hernias.  BREASTS: Symmetrical, no skin changes or visible lesions.  No palpable masses, nipple discharge bilaterally.  PELVIC: Normal external genitalia without lesions.  Normal hair distribution.  Adequate perineal body, normal urethral meatus.  Vagina atrophic without lesions or discharge.  Cervix pink, without lesions, discharge or tenderness.  No significant cystocele or rectocele.  Bimanual exam shows uterus to be normal size, regular, mobile and nontender.  Adnexa without masses or tenderness.    EXTREMITIES: No edema.  Physical Exam    1. Encounter for gynecological examination without abnormal finding  Liquid-Based Pap Smear, Screening    HPV High Risk Genotypes, PCR    AND PLAN:    Patient was counseled today on A.C.S. Pap guidelines and recommendations for yearly pelvic exams, mammograms and monthly self breast exams; to see her PCP for other health maintenance.

## 2022-05-05 ENCOUNTER — PATIENT MESSAGE (OUTPATIENT)
Dept: OBSTETRICS AND GYNECOLOGY | Facility: CLINIC | Age: 67
End: 2022-05-05
Payer: MEDICARE

## 2022-05-05 LAB
FINAL PATHOLOGIC DIAGNOSIS: NORMAL
Lab: NORMAL

## 2022-05-18 DIAGNOSIS — I47.10 PAROXYSMAL SUPRAVENTRICULAR TACHYCARDIA: Primary | ICD-10-CM

## 2022-05-19 ENCOUNTER — OFFICE VISIT (OUTPATIENT)
Dept: CARDIOLOGY | Facility: CLINIC | Age: 67
End: 2022-05-19
Payer: MEDICARE

## 2022-05-19 ENCOUNTER — HOSPITAL ENCOUNTER (OUTPATIENT)
Dept: CARDIOLOGY | Facility: HOSPITAL | Age: 67
Discharge: HOME | End: 2022-05-19
Attending: STUDENT IN AN ORGANIZED HEALTH CARE EDUCATION/TRAINING PROGRAM
Payer: MEDICARE

## 2022-05-19 VITALS
DIASTOLIC BLOOD PRESSURE: 64 MMHG | BODY MASS INDEX: 24.8 KG/M2 | WEIGHT: 127 LBS | HEART RATE: 74 BPM | OXYGEN SATURATION: 98 % | SYSTOLIC BLOOD PRESSURE: 106 MMHG

## 2022-05-19 DIAGNOSIS — I47.10 PAROXYSMAL SUPRAVENTRICULAR TACHYCARDIA: Primary | ICD-10-CM

## 2022-05-19 DIAGNOSIS — G43.809 OTHER MIGRAINE WITHOUT STATUS MIGRAINOSUS, NOT INTRACTABLE: ICD-10-CM

## 2022-05-19 DIAGNOSIS — I47.10 PAROXYSMAL SUPRAVENTRICULAR TACHYCARDIA: ICD-10-CM

## 2022-05-19 DIAGNOSIS — F41.1 ANXIETY STATE: ICD-10-CM

## 2022-05-19 DIAGNOSIS — E78.00 PURE HYPERCHOLESTEROLEMIA: ICD-10-CM

## 2022-05-19 PROCEDURE — 93005 ELECTROCARDIOGRAM TRACING: CPT

## 2022-05-19 PROCEDURE — 1101F PT FALLS ASSESS-DOCD LE1/YR: CPT | Mod: CPTII,S$GLB,, | Performed by: STUDENT IN AN ORGANIZED HEALTH CARE EDUCATION/TRAINING PROGRAM

## 2022-05-19 PROCEDURE — 3288F PR FALLS RISK ASSESSMENT DOCUMENTED: ICD-10-PCS | Mod: CPTII,S$GLB,, | Performed by: STUDENT IN AN ORGANIZED HEALTH CARE EDUCATION/TRAINING PROGRAM

## 2022-05-19 PROCEDURE — 3078F PR MOST RECENT DIASTOLIC BLOOD PRESSURE < 80 MM HG: ICD-10-PCS | Mod: CPTII,S$GLB,, | Performed by: STUDENT IN AN ORGANIZED HEALTH CARE EDUCATION/TRAINING PROGRAM

## 2022-05-19 PROCEDURE — 93010 EKG 12-LEAD: ICD-10-PCS | Mod: ,,, | Performed by: INTERNAL MEDICINE

## 2022-05-19 PROCEDURE — 3078F DIAST BP <80 MM HG: CPT | Mod: CPTII,S$GLB,, | Performed by: STUDENT IN AN ORGANIZED HEALTH CARE EDUCATION/TRAINING PROGRAM

## 2022-05-19 PROCEDURE — 3074F PR MOST RECENT SYSTOLIC BLOOD PRESSURE < 130 MM HG: ICD-10-PCS | Mod: CPTII,S$GLB,, | Performed by: STUDENT IN AN ORGANIZED HEALTH CARE EDUCATION/TRAINING PROGRAM

## 2022-05-19 PROCEDURE — 99214 OFFICE O/P EST MOD 30 MIN: CPT | Mod: S$GLB,,, | Performed by: STUDENT IN AN ORGANIZED HEALTH CARE EDUCATION/TRAINING PROGRAM

## 2022-05-19 PROCEDURE — 3008F BODY MASS INDEX DOCD: CPT | Mod: CPTII,S$GLB,, | Performed by: STUDENT IN AN ORGANIZED HEALTH CARE EDUCATION/TRAINING PROGRAM

## 2022-05-19 PROCEDURE — 3074F SYST BP LT 130 MM HG: CPT | Mod: CPTII,S$GLB,, | Performed by: STUDENT IN AN ORGANIZED HEALTH CARE EDUCATION/TRAINING PROGRAM

## 2022-05-19 PROCEDURE — 1126F AMNT PAIN NOTED NONE PRSNT: CPT | Mod: CPTII,S$GLB,, | Performed by: STUDENT IN AN ORGANIZED HEALTH CARE EDUCATION/TRAINING PROGRAM

## 2022-05-19 PROCEDURE — 3288F FALL RISK ASSESSMENT DOCD: CPT | Mod: CPTII,S$GLB,, | Performed by: STUDENT IN AN ORGANIZED HEALTH CARE EDUCATION/TRAINING PROGRAM

## 2022-05-19 PROCEDURE — 99214 PR OFFICE/OUTPT VISIT, EST, LEVL IV, 30-39 MIN: ICD-10-PCS | Mod: S$GLB,,, | Performed by: STUDENT IN AN ORGANIZED HEALTH CARE EDUCATION/TRAINING PROGRAM

## 2022-05-19 PROCEDURE — 99999 PR PBB SHADOW E&M-EST. PATIENT-LVL III: CPT | Mod: PBBFAC,,, | Performed by: STUDENT IN AN ORGANIZED HEALTH CARE EDUCATION/TRAINING PROGRAM

## 2022-05-19 PROCEDURE — 3008F PR BODY MASS INDEX (BMI) DOCUMENTED: ICD-10-PCS | Mod: CPTII,S$GLB,, | Performed by: STUDENT IN AN ORGANIZED HEALTH CARE EDUCATION/TRAINING PROGRAM

## 2022-05-19 PROCEDURE — 93010 ELECTROCARDIOGRAM REPORT: CPT | Mod: ,,, | Performed by: INTERNAL MEDICINE

## 2022-05-19 PROCEDURE — 99999 PR PBB SHADOW E&M-EST. PATIENT-LVL III: ICD-10-PCS | Mod: PBBFAC,,, | Performed by: STUDENT IN AN ORGANIZED HEALTH CARE EDUCATION/TRAINING PROGRAM

## 2022-05-19 PROCEDURE — 1101F PR PT FALLS ASSESS DOC 0-1 FALLS W/OUT INJ PAST YR: ICD-10-PCS | Mod: CPTII,S$GLB,, | Performed by: STUDENT IN AN ORGANIZED HEALTH CARE EDUCATION/TRAINING PROGRAM

## 2022-05-19 PROCEDURE — 1126F PR PAIN SEVERITY QUANTIFIED, NO PAIN PRESENT: ICD-10-PCS | Mod: CPTII,S$GLB,, | Performed by: STUDENT IN AN ORGANIZED HEALTH CARE EDUCATION/TRAINING PROGRAM

## 2022-05-19 NOTE — PROGRESS NOTES
Subjective:   Patient ID:  Jaylyn May is a 66 y.o. female who presents for follow up of No chief complaint on file.      HPI  A 66 YO FEMALE WITH PSVT MIGRAINE USED TO FOLLOW WITH dr swift she is here for yearly follow up she is fairly active needs more regimented aerobic exercise has very rare palpitation last few minutes at worse. Has no chest pain no shortness of breath compliant with b blockers no syncope .        22  Sees Dr. Garza in cardiology clinic. Would like a female physician   Does not exercise regularly. Used to exercises regularly  Tries to be active at home   Weight has been stable  Avoids certain foods   Retired nurse   was diagnosed with lymphoma years ago, doing well now   denies tobacco abuse. ETOH 1-2 glasses wine nightly     Denies any symptoms. No palpitatons         EKG 22 NSR, nonspecific ST abn, qtc 412 ms     Echo   · Normal systolic function.  · The estimated ejection fraction is 60%.  · Normal left ventricular diastolic function.  · Normal right ventricular size with normal right ventricular systolic function.  · Mild aortic regurgitation.  · Mild mitral regurgitation.  · Mild tricuspid regurgitation.  · Normal central venous pressure (3 mmHg).  · The estimated PA systolic pressure is 33 mmHg.        Past Medical History:   Diagnosis Date    Abnormal Pap smear     over 15 years     Pituitary cyst     Supraventricular tachycardia        Past Surgical History:   Procedure Laterality Date     SECTION      MANDIBLE FRACTURE SURGERY         Social History     Tobacco Use    Smoking status: Never Smoker    Smokeless tobacco: Never Used   Substance Use Topics    Alcohol use: Yes     Alcohol/week: 7.0 standard drinks     Types: 7 Glasses of wine per week     Comment: socally     Drug use: No       Family History   Problem Relation Age of Onset    Hypertension Mother     Cataracts Mother     Blindness Maternal Aunt     Hypertension Maternal Aunt      Cataracts Maternal Aunt     Breast cancer Sister     Cancer Maternal Grandmother     Breast cancer Maternal Grandmother     Leukemia Paternal Aunt        Current Outpatient Medications   Medication Sig    aspirin (ECOTRIN) 81 MG EC tablet Take 81 mg by mouth once daily.    BIOTIN ORAL Take 1 tablet by mouth once daily.     cetirizine (ZYRTEC) 10 MG tablet Take 10 mg by mouth as needed.     diphenhydrAMINE-acetaminophen (TYLENOL PM)  mg Tab Take 1 tablet by mouth nightly.    ibandronate (BONIVA) 150 mg tablet take one tablet by mouth every 30 days as directed    LORazepam (ATIVAN) 1 MG tablet Take one tablet by mouth every 6 (six) hours as needed.    metoprolol succinate (TOPROL-XL) 25 MG 24 hr tablet TAKE 1/2 TABLET ONCE DAILY    rizatriptan (MAXALT) 10 MG tablet Take 10 mg by mouth as needed for Migraine.    simvastatin (ZOCOR) 20 MG tablet Take 1 tablet by mouth once daily.    venlafaxine (EFFEXOR-XR) 150 MG Cp24 Take 150 mg by mouth once daily.    GARLIC EXTRACT ORAL Take 1 capsule by mouth once daily.    melatonin 10 mg Tab Take 20 mg by mouth every evening.    venlafaxine (EFFEXOR-XR) 75 MG 24 hr capsule Take 75 mg by mouth 2 (two) times daily.      No current facility-administered medications for this visit.     Current Outpatient Medications on File Prior to Visit   Medication Sig    aspirin (ECOTRIN) 81 MG EC tablet Take 81 mg by mouth once daily.    BIOTIN ORAL Take 1 tablet by mouth once daily.     cetirizine (ZYRTEC) 10 MG tablet Take 10 mg by mouth as needed.     diphenhydrAMINE-acetaminophen (TYLENOL PM)  mg Tab Take 1 tablet by mouth nightly.    ibandronate (BONIVA) 150 mg tablet take one tablet by mouth every 30 days as directed    LORazepam (ATIVAN) 1 MG tablet Take one tablet by mouth every 6 (six) hours as needed.    metoprolol succinate (TOPROL-XL) 25 MG 24 hr tablet TAKE 1/2 TABLET ONCE DAILY    rizatriptan (MAXALT) 10 MG tablet Take 10 mg by mouth as  needed for Migraine.    simvastatin (ZOCOR) 20 MG tablet Take 1 tablet by mouth once daily.    venlafaxine (EFFEXOR-XR) 150 MG Cp24 Take 150 mg by mouth once daily.    GARLIC EXTRACT ORAL Take 1 capsule by mouth once daily.    melatonin 10 mg Tab Take 20 mg by mouth every evening.    venlafaxine (EFFEXOR-XR) 75 MG 24 hr capsule Take 75 mg by mouth 2 (two) times daily.      No current facility-administered medications on file prior to visit.     Review of patient's allergies indicates:   Allergen Reactions    Penicillins      rash     Review of Systems   Constitutional: Negative for diaphoresis, malaise/fatigue and weight gain.   HENT: Negative for hoarse voice.    Eyes: Negative for double vision and visual disturbance.   Cardiovascular: Negative for chest pain, claudication, cyanosis, dyspnea on exertion, irregular heartbeat, leg swelling, near-syncope, orthopnea, paroxysmal nocturnal dyspnea and syncope.   Respiratory: Negative for cough, hemoptysis, shortness of breath and snoring.    Hematologic/Lymphatic: Negative for bleeding problem. Does not bruise/bleed easily.   Skin: Negative for color change and poor wound healing.   Musculoskeletal: Negative for muscle cramps, muscle weakness and myalgias.   Gastrointestinal: Negative for bloating, abdominal pain, change in bowel habit, diarrhea, heartburn, hematemesis, hematochezia, melena and nausea.   Neurological: Negative for excessive daytime sleepiness, dizziness, headaches, light-headedness, loss of balance, numbness and weakness.   Psychiatric/Behavioral: Negative for memory loss. The patient does not have insomnia.    Allergic/Immunologic: Negative for hives.             Objective:     Physical exam     The pt is alert and oriented X 3  HEENT: No Icterus/Pale Conjunctiva/LAD  CVS     : No abnormal cardiac pulsations noted on inspection. JVP not raised.                 The apical impulse is normal on palpation, and is located in the left 5th                 intercostal space in the mid - clavicular line. No palpable thrills or abnormal                pulsations noted. RR, S1 - S2 heard, no murmurs, rubs or gallops appreciated.   PUL     : CTA B/L. No wheezes/crackles heard   ABD     : BS +, soft. No tenderness elicited   LE        : No C/C/E. Distal Pulses palpable B/L         Vitals:    05/19/22 1019   BP: 106/64   Pulse: 74   SpO2: 98%   Weight: 57.6 kg (127 lb)     Lab Results   Component Value Date    CHOL 192 02/27/2020    CHOL 225 (H) 09/08/2011    CHOL 221 (H) 09/22/2009     Lab Results   Component Value Date    HDL 60 02/27/2020    HDL 64 09/08/2011    HDL 62 09/22/2009     Lab Results   Component Value Date    LDLCALC 113.6 02/27/2020    LDLCALC 149.6 09/08/2011    LDLCALC 139.8 (H) 09/22/2009     Lab Results   Component Value Date    TRIG 92 02/27/2020    TRIG 57 09/08/2011    TRIG 96 09/22/2009     Lab Results   Component Value Date    CHOLHDL 31.3 02/27/2020    CHOLHDL 28.4 09/08/2011    CHOLHDL 28.1 09/22/2009       Chemistry        Component Value Date/Time     08/08/2019 1417    K 4.3 08/08/2019 1417     08/08/2019 1417    CO2 27 08/08/2019 1417    BUN 11 08/08/2019 1417    CREATININE 0.7 08/08/2019 1417     08/08/2019 1417        Component Value Date/Time    CALCIUM 9.8 08/08/2019 1417    ALKPHOS 69 09/08/2011 0848    AST 22 09/08/2011 0848    ALT 23 09/08/2011 0848    BILITOT 0.5 09/08/2011 0848    ESTGFRAFRICA >60.0 08/08/2019 1417    EGFRNONAA >60.0 08/08/2019 1417          Lab Results   Component Value Date    TSH 1.613 08/08/2019     No results found for: INR, PROTIME  Lab Results   Component Value Date    WBC 7.31 02/27/2020    HGB 12.9 02/27/2020    HCT 41.8 02/27/2020    MCV 95 02/27/2020     02/27/2020     BMP  Sodium   Date Value Ref Range Status   08/08/2019 141 136 - 145 mmol/L Final     Potassium   Date Value Ref Range Status   08/08/2019 4.3 3.5 - 5.1 mmol/L Final     Chloride   Date Value Ref Range Status    08/08/2019 104 95 - 110 mmol/L Final     CO2   Date Value Ref Range Status   08/08/2019 27 23 - 29 mmol/L Final     BUN   Date Value Ref Range Status   08/08/2019 11 8 - 23 mg/dL Final     Creatinine   Date Value Ref Range Status   08/08/2019 0.7 0.5 - 1.4 mg/dL Final     Calcium   Date Value Ref Range Status   08/08/2019 9.8 8.7 - 10.5 mg/dL Final     Anion Gap   Date Value Ref Range Status   08/08/2019 10 8 - 16 mmol/L Final     eGFR if    Date Value Ref Range Status   08/08/2019 >60.0 >60 mL/min/1.73 m^2 Final     eGFR if non    Date Value Ref Range Status   08/08/2019 >60.0 >60 mL/min/1.73 m^2 Final     Comment:     Calculation used to obtain the estimated glomerular filtration  rate (eGFR) is the CKD-EPI equation.        CrCl cannot be calculated (Patient's most recent lab result is older than the maximum 7 days allowed.).    Assessment:     1. Paroxysmal supraventricular tachycardia    2. Other migraine without status migrainosus, not intractable    3. Anxiety state    4. Pure hypercholesterolemia      Doing well. No symptoms.  Trying to get back to exercising .  Taking meds as prescribed.     Plan:   Recheck lipid panel  Continue current therapy  Cardiac low salt diet.  Risk factor modification and excercise program.  F/u yearly earlier if an issue.     All labs and studies reviewed.     Isabelle Roque MD  Cardiology Staff

## 2022-06-03 ENCOUNTER — LAB VISIT (OUTPATIENT)
Dept: LAB | Facility: HOSPITAL | Age: 67
End: 2022-06-03
Attending: STUDENT IN AN ORGANIZED HEALTH CARE EDUCATION/TRAINING PROGRAM
Payer: MEDICARE

## 2022-06-03 DIAGNOSIS — E78.00 PURE HYPERCHOLESTEROLEMIA: ICD-10-CM

## 2022-06-03 LAB
CHOLEST SERPL-MCNC: 191 MG/DL (ref 120–199)
CHOLEST/HDLC SERPL: 3.2 {RATIO} (ref 2–5)
HDLC SERPL-MCNC: 60 MG/DL (ref 40–75)
HDLC SERPL: 31.4 % (ref 20–50)
LDLC SERPL CALC-MCNC: 112.4 MG/DL (ref 63–159)
NONHDLC SERPL-MCNC: 131 MG/DL
TRIGL SERPL-MCNC: 93 MG/DL (ref 30–150)

## 2022-06-03 PROCEDURE — 80061 LIPID PANEL: CPT | Performed by: STUDENT IN AN ORGANIZED HEALTH CARE EDUCATION/TRAINING PROGRAM

## 2022-06-03 PROCEDURE — 36415 COLL VENOUS BLD VENIPUNCTURE: CPT | Performed by: STUDENT IN AN ORGANIZED HEALTH CARE EDUCATION/TRAINING PROGRAM

## 2022-06-05 ENCOUNTER — PATIENT MESSAGE (OUTPATIENT)
Dept: OBSTETRICS AND GYNECOLOGY | Facility: CLINIC | Age: 67
End: 2022-06-05
Payer: MEDICARE

## 2022-06-06 ENCOUNTER — PATIENT MESSAGE (OUTPATIENT)
Dept: OBSTETRICS AND GYNECOLOGY | Facility: CLINIC | Age: 67
End: 2022-06-06
Payer: MEDICARE

## 2022-06-06 ENCOUNTER — PATIENT MESSAGE (OUTPATIENT)
Dept: CARDIOLOGY | Facility: CLINIC | Age: 67
End: 2022-06-06
Payer: MEDICARE

## 2022-07-02 NOTE — PROGRESS NOTES
Subjective:   Patient ID:  Jayyln May is a 64 y.o. female who presents for follow-up of Palpitations (PSVT) and Hyperlipidemia      HPI DOING, SIGNIFICANT IMPROVEMENT OF FREQUENCY AND DURATION OF PALPITATIONS.  1-2 PER MONTHS. VERY SHORT DURATION. NO ASSOCIATED SYMPTOMS  BBS WELL TOLERATED  NO CHEST DISCOMFORT  NO UNUSUAL CASTELLON. NO ORTHOPNEA OR PND  NO EDEMA. NO CALVE TENDERNESS  NO ABDOMINAL DISCOMFORT  NO FOCAL CNS SYMPTOMS OR SIGNS TO SUGGEST TIA OR STROKE    Review of Systems   Constitution: Negative for chills, fever, night sweats, weight gain and weight loss.   HENT: Negative for nosebleeds.    Eyes: Negative for blurred vision, double vision and visual disturbance.   Cardiovascular: Positive for palpitations. Negative for chest pain, dyspnea on exertion, irregular heartbeat, leg swelling, orthopnea, paroxysmal nocturnal dyspnea and syncope.   Respiratory: Negative for cough, hemoptysis and wheezing.    Endocrine: Negative for polydipsia and polyuria.   Hematologic/Lymphatic: Does not bruise/bleed easily.   Skin: Negative for rash.   Musculoskeletal: Negative for joint pain, joint swelling, muscle weakness and myalgias.   Gastrointestinal: Negative for abdominal pain, hematemesis, jaundice and melena.   Genitourinary: Negative for dysuria, hematuria and nocturia.   Neurological: Negative for dizziness, focal weakness, headaches, sensory change and weakness.   Psychiatric/Behavioral: Negative for depression. The patient does not have insomnia and is not nervous/anxious.      Family History   Problem Relation Age of Onset    Hypertension Mother     Cataracts Mother     Blindness Maternal Aunt     Hypertension Maternal Aunt     Cataracts Maternal Aunt     Breast cancer Sister     Cancer Maternal Grandmother     Breast cancer Maternal Grandmother     Leukemia Paternal Aunt      Past Medical History:   Diagnosis Date    Abnormal Pap smear     over 15 years     Pituitary cyst     Supraventricular  tachycardia      Social History     Socioeconomic History    Marital status:      Spouse name: Not on file    Number of children: Not on file    Years of education: Not on file    Highest education level: Not on file   Occupational History    Not on file   Social Needs    Financial resource strain: Not on file    Food insecurity:     Worry: Not on file     Inability: Not on file    Transportation needs:     Medical: Not on file     Non-medical: Not on file   Tobacco Use    Smoking status: Never Smoker    Smokeless tobacco: Never Used   Substance and Sexual Activity    Alcohol use: Yes     Alcohol/week: 7.0 standard drinks     Types: 7 Glasses of wine per week     Comment: socally     Drug use: No    Sexual activity: Yes     Partners: Male     Birth control/protection: None   Lifestyle    Physical activity:     Days per week: Not on file     Minutes per session: Not on file    Stress: Not on file   Relationships    Social connections:     Talks on phone: Not on file     Gets together: Not on file     Attends [employer] service: Not on file     Active member of club or organization: Not on file     Attends meetings of clubs or organizations: Not on file     Relationship status: Not on file   Other Topics Concern    Not on file   Social History Narrative    Wears a seatbelt.     Current Outpatient Medications on File Prior to Visit   Medication Sig Dispense Refill    aspirin (ECOTRIN) 81 MG EC tablet Take 81 mg by mouth once daily.      BIOTIN ORAL Take 1 tablet by mouth once daily.       cetirizine (ZYRTEC) 10 MG tablet Take 10 mg by mouth as needed.       diphenhydrAMINE-acetaminophen (TYLENOL PM)  mg Tab Take 1 tablet by mouth nightly.      GARLIC EXTRACT ORAL Take 1 capsule by mouth once daily.      ibandronate (BONIVA) 150 mg tablet take one tablet by mouth every 30 days as directed 3 tablet 3    LORazepam (ATIVAN) 1 MG tablet Take one tablet by mouth every 6 (six) hours as  needed. 30 tablet 2    melatonin 10 mg Tab Take 20 mg by mouth every evening.      metoprolol succinate (TOPROL-XL) 25 MG 24 hr tablet TAKE 1/2 TABLET ONCE DAILY 45 tablet 3    rizatriptan (MAXALT) 10 MG tablet Take 10 mg by mouth as needed for Migraine.      simvastatin (ZOCOR) 20 MG tablet Take 1 tablet by mouth once daily.      venlafaxine (EFFEXOR-XR) 75 MG 24 hr capsule Take 75 mg by mouth 2 (two) times daily.        No current facility-administered medications on file prior to visit.      Review of patient's allergies indicates:   Allergen Reactions    Penicillins      rash       Objective:     Physical Exam   Constitutional: She is oriented to person, place, and time. She appears well-developed. No distress.   HENT:   Head: Normocephalic.   Eyes: Pupils are equal, round, and reactive to light. Conjunctivae are normal. No scleral icterus.   Neck: Normal range of motion. Neck supple. Normal carotid pulses, no hepatojugular reflux and no JVD present. Carotid bruit is not present. No edema present. No thyroid mass and no thyromegaly present.   Cardiovascular: Normal rate, regular rhythm, S1 normal, S2 normal, normal heart sounds and intact distal pulses. PMI is not displaced. Exam reveals no gallop and no friction rub.   No murmur heard.  Pulses:       Carotid pulses are 2+ on the right side, and 2+ on the left side.       Radial pulses are 2+ on the right side, and 2+ on the left side.        Femoral pulses are 2+ on the right side, and 2+ on the left side.       Popliteal pulses are 2+ on the right side, and 2+ on the left side.        Dorsalis pedis pulses are 2+ on the right side, and 2+ on the left side.        Posterior tibial pulses are 2+ on the right side, and 2+ on the left side.   Pulmonary/Chest: Effort normal and breath sounds normal. She has no wheezes. She has no rales. She exhibits no tenderness.   Abdominal: Soft. Bowel sounds are normal. She exhibits no pulsatile midline mass and no mass.  There is no hepatosplenomegaly. There is no tenderness.   Musculoskeletal: Normal range of motion. She exhibits no edema or tenderness.        Cervical back: Normal.        Thoracic back: Normal.        Lumbar back: Normal.   Lymphadenopathy:     She has no cervical adenopathy.     She has no axillary adenopathy.        Right: No supraclavicular adenopathy present.        Left: No supraclavicular adenopathy present.   Neurological: She is alert and oriented to person, place, and time. She has normal strength and normal reflexes. No sensory deficit. Gait normal.   Skin: Skin is warm. No rash noted. No cyanosis. No pallor. Nails show no clubbing.   Psychiatric: She has a normal mood and affect. Her speech is normal and behavior is normal. Cognition and memory are normal.       Assessment:     1. Paroxysmal supraventricular tachycardia    2. Pure hypercholesterolemia        Plan:     Paroxysmal supraventricular tachycardia  WELL CONTROLLED CARD ARRHYTHMIAS  NO ADHF- NO ACTIVE MYOCARDIAL ISCHEMIA  CARD MED WELL TOLERATED    Pure hypercholesterolemia  STATIN WELL TOLERATED    CONTINUE PRESENT CARD MANAGEMENT  RETURN IN ONE YEAR         ---

## 2022-08-08 ENCOUNTER — OFFICE VISIT (OUTPATIENT)
Dept: DERMATOLOGY | Facility: CLINIC | Age: 67
End: 2022-08-08
Payer: MEDICARE

## 2022-08-08 DIAGNOSIS — L81.4 SOLAR LENTIGO: ICD-10-CM

## 2022-08-08 DIAGNOSIS — Z85.828 HISTORY OF NONMELANOMA SKIN CANCER: Primary | ICD-10-CM

## 2022-08-08 DIAGNOSIS — D22.9 MULTIPLE BENIGN NEVI: ICD-10-CM

## 2022-08-08 DIAGNOSIS — D18.00 HEMANGIOMA, UNSPECIFIED SITE: ICD-10-CM

## 2022-08-08 DIAGNOSIS — L82.1 SEBORRHEIC KERATOSES: ICD-10-CM

## 2022-08-08 DIAGNOSIS — L57.0 ACTINIC KERATOSIS: ICD-10-CM

## 2022-08-08 PROCEDURE — 1159F PR MEDICATION LIST DOCUMENTED IN MEDICAL RECORD: ICD-10-PCS | Mod: CPTII,S$GLB,, | Performed by: STUDENT IN AN ORGANIZED HEALTH CARE EDUCATION/TRAINING PROGRAM

## 2022-08-08 PROCEDURE — 99203 OFFICE O/P NEW LOW 30 MIN: CPT | Mod: 25,S$GLB,, | Performed by: STUDENT IN AN ORGANIZED HEALTH CARE EDUCATION/TRAINING PROGRAM

## 2022-08-08 PROCEDURE — 17000 DESTRUCT PREMALG LESION: CPT | Mod: S$GLB,,, | Performed by: STUDENT IN AN ORGANIZED HEALTH CARE EDUCATION/TRAINING PROGRAM

## 2022-08-08 PROCEDURE — 1159F MED LIST DOCD IN RCRD: CPT | Mod: CPTII,S$GLB,, | Performed by: STUDENT IN AN ORGANIZED HEALTH CARE EDUCATION/TRAINING PROGRAM

## 2022-08-08 PROCEDURE — 3288F PR FALLS RISK ASSESSMENT DOCUMENTED: ICD-10-PCS | Mod: CPTII,S$GLB,, | Performed by: STUDENT IN AN ORGANIZED HEALTH CARE EDUCATION/TRAINING PROGRAM

## 2022-08-08 PROCEDURE — 1126F AMNT PAIN NOTED NONE PRSNT: CPT | Mod: CPTII,S$GLB,, | Performed by: STUDENT IN AN ORGANIZED HEALTH CARE EDUCATION/TRAINING PROGRAM

## 2022-08-08 PROCEDURE — 99999 PR PBB SHADOW E&M-EST. PATIENT-LVL III: ICD-10-PCS | Mod: PBBFAC,,, | Performed by: STUDENT IN AN ORGANIZED HEALTH CARE EDUCATION/TRAINING PROGRAM

## 2022-08-08 PROCEDURE — 99999 PR PBB SHADOW E&M-EST. PATIENT-LVL III: CPT | Mod: PBBFAC,,, | Performed by: STUDENT IN AN ORGANIZED HEALTH CARE EDUCATION/TRAINING PROGRAM

## 2022-08-08 PROCEDURE — 99203 PR OFFICE/OUTPT VISIT, NEW, LEVL III, 30-44 MIN: ICD-10-PCS | Mod: 25,S$GLB,, | Performed by: STUDENT IN AN ORGANIZED HEALTH CARE EDUCATION/TRAINING PROGRAM

## 2022-08-08 PROCEDURE — 1160F PR REVIEW ALL MEDS BY PRESCRIBER/CLIN PHARMACIST DOCUMENTED: ICD-10-PCS | Mod: CPTII,S$GLB,, | Performed by: STUDENT IN AN ORGANIZED HEALTH CARE EDUCATION/TRAINING PROGRAM

## 2022-08-08 PROCEDURE — 1101F PT FALLS ASSESS-DOCD LE1/YR: CPT | Mod: CPTII,S$GLB,, | Performed by: STUDENT IN AN ORGANIZED HEALTH CARE EDUCATION/TRAINING PROGRAM

## 2022-08-08 PROCEDURE — 17000 PR DESTRUCTION(LASER SURGERY,CRYOSURGERY,CHEMOSURGERY),PREMALIGNANT LESIONS,FIRST LESION: ICD-10-PCS | Mod: S$GLB,,, | Performed by: STUDENT IN AN ORGANIZED HEALTH CARE EDUCATION/TRAINING PROGRAM

## 2022-08-08 PROCEDURE — 3288F FALL RISK ASSESSMENT DOCD: CPT | Mod: CPTII,S$GLB,, | Performed by: STUDENT IN AN ORGANIZED HEALTH CARE EDUCATION/TRAINING PROGRAM

## 2022-08-08 PROCEDURE — 1126F PR PAIN SEVERITY QUANTIFIED, NO PAIN PRESENT: ICD-10-PCS | Mod: CPTII,S$GLB,, | Performed by: STUDENT IN AN ORGANIZED HEALTH CARE EDUCATION/TRAINING PROGRAM

## 2022-08-08 PROCEDURE — 1101F PR PT FALLS ASSESS DOC 0-1 FALLS W/OUT INJ PAST YR: ICD-10-PCS | Mod: CPTII,S$GLB,, | Performed by: STUDENT IN AN ORGANIZED HEALTH CARE EDUCATION/TRAINING PROGRAM

## 2022-08-08 PROCEDURE — 1160F RVW MEDS BY RX/DR IN RCRD: CPT | Mod: CPTII,S$GLB,, | Performed by: STUDENT IN AN ORGANIZED HEALTH CARE EDUCATION/TRAINING PROGRAM

## 2022-08-08 RX ORDER — MELOXICAM 7.5 MG/1
7.5 TABLET ORAL DAILY PRN
COMMUNITY
End: 2023-05-25

## 2022-08-08 NOTE — PROGRESS NOTES
Patient Information  Name: Jaylyn May  : 1955  MRN: [id number]     Referring Physician:  Dr. Castaneda   Primary Care Physician:  Dr. Humberto Colon MD   Date of Visit: 2022      Subjective:       Jaylyn May is a 66 y.o. female who presents for FBSE  HPI  History of Present Illness: The patient presents with chief complaint of skin check.  Patient here for skin check.     Does patient have a personal hx of skin cancers? Yes, right hand BCC  Does patient have family hx of melanoma?  no  Does patient have hx of strong sun exposure or tanning bed use in the past? yes    Patient was last seen:Visit date not found     Prior notes by myself reviewed.   Clinical documentation obtained by nursing staff reviewed.    Review of Systems   Skin: Negative for itching and rash.        Objective:    Physical Exam   Constitutional: She appears well-developed and well-nourished. No distress.   Neurological: She is alert and oriented to person, place, and time. She is not disoriented.   Psychiatric: She has a normal mood and affect.   Skin:   Areas Examined (abnormalities noted in diagram):   Scalp / Hair Palpated and Inspected  Head / Face Inspection Performed  Neck Inspection Performed  Chest / Axilla Inspection Performed  Abdomen Inspection Performed  Genitals / Buttocks / Groin Inspection Performed  Back Inspection Performed  RUE Inspected  LUE Inspection Performed  RLE Inspected  LLE Inspection Performed  Nails and Digits Inspection Performed                   Diagram Legend     Erythematous scaling macule/papule c/w actinic keratosis       Vascular papule c/w angioma      Pigmented verrucoid papule/plaque c/w seborrheic keratosis      Yellow umbilicated papule c/w sebaceous hyperplasia      Irregularly shaped tan macule c/w lentigo     1-2 mm smooth white papules consistent with Milia      Movable subcutaneous cyst with punctum c/w epidermal inclusion cyst      Subcutaneous movable cyst c/w pilar cyst       Firm pink to brown papule c/w dermatofibroma      Pedunculated fleshy papule(s) c/w skin tag(s)      Evenly pigmented macule c/w junctional nevus     Mildly variegated pigmented, slightly irregular-bordered macule c/w mildly atypical nevus      Flesh colored to evenly pigmented papule c/w intradermal nevus       Pink pearly papule/plaque c/w basal cell carcinoma      Erythematous hyperkeratotic cursted plaque c/w SCC      Surgical scar with no sign of skin cancer recurrence      Open and closed comedones      Inflammatory papules and pustules      Verrucoid papule consistent consistent with wart     Erythematous eczematous patches and plaques     Dystrophic onycholytic nail with subungual debris c/w onychomycosis     Umbilicated papule    Erythematous-base heme-crusted tan verrucoid plaque consistent with inflamed seborrheic keratosis     Erythematous Silvery Scaling Plaque c/w Psoriasis     See annotation      No images are attached to the encounter or orders placed in the encounter.    [] Data reviewed  [] Independent review of test  [] Management discussed with another provider    Assessment / Plan:        History of nonmelanoma skin cancer  Area of previous nonmelanoma examined. Site well healed with no signs of recurrence.    Total body skin examination performed today including at least 12 points as noted in physical examination. No lesions suspicious for malignancy noted.    Recommend daily sun protection/avoidance, use of at least SPF 30, broad spectrum sunscreen (OTC drug), skin self examinations, and routine physician surveillance to optimize early detection    Actinic keratosis  Cryosurgery Procedure Note    Verbal consent from the patient is obtained including, but not limited to, risk of hypopigmentation/hyperpigmentation, scar, recurrence of lesion. The patient is aware of the precancerous quality and need for treatment of these lesions. Liquid nitrogen cryosurgery is applied to the 1 actinic  keratoses, as detailed in the physical exam, to produce a freeze injury. The patient is aware that blisters may form and is instructed on wound care with gentle cleansing and use of vaseline ointment to keep moist until healed. The patient is supplied a handout on cryosurgery and is instructed to call if lesions do not completely resolve.    Multiple benign nevi  Discussed ABCDE's of nevi.  Monitor for new mole or moles that are becoming bigger, darker, irritated, or developing irregular borders. Brochure provided. Instructed patient to observe lesion(s) for changes and follow up in clinic if changes are noted. Patient to monitor skin at home for new or changing lesions.     Solar lentigo  This is a benign hyperpigmented sun induced lesion. Recommend daily sun protection/avoidance and use of at least SPF 30, broad spectrum sunscreen (OTC drug) will reduce the number of new lesions. Treatment of these benign lesions are considered cosmetic.    Hemangioma, unspecified site  This is a benign vascular lesion. Reassurance given. No treatment required.     Seborrheic keratoses  These are benign inherited growths without a malignant potential. Reassurance given to patient. No treatment is necessary.              LOS NUMBER AND COMPLEXITY OF PROBLEMS    COMPLEXITY OF DATA RISK TOTAL TIME (m)   57499  59272 [] 1 self-limited or minor problem [x] Minimal to none [] No treatment recommended or patient to monitor 15-29  10-19   37948  71460 Low  [] 2 or > self limited or minor problems  [] 1 stable chronic illness  [] 1 acute, uncomplicated illness or injury Limited (2)  [] Prior external notes from each unique source  [] Review result of each unique test  [] Order each unique test [x]  Low  OTC medications, minor skin biopsy 30-44 20-29   93551  27900 Moderate  []  1 or > chronic illness with progression, exacerbation or SE of treatment  [x]  2 or more stable chronic illnesses  []  1 acute illness with systemic symptoms  []   1 acute complicated injury  []  1 undiagnosed new problem with uncertain prognosis Moderate (1/3 below)  []  3 or more data items        *Now includes assessment requiring independent historian  []  Independent interpretation of a test  []  Discuss management/test with another provider Moderate  []  Prescription drug mgmt  []  Minor surgery with risk discussed  []  Mgmt limited by social determinates 45-59  30-39   72912  84294 High  []  1 or more chronic illness with severe exacerbation, progression or SE of treatment  []  1 acute or chronic illness/injury that poses a threat to life or bodily function Extensive (2/3 below)  []  3 or more data items        *Now includes assessment requiring independent historian.  []  Independent interpretation of a test  []  Discuss management/test with another provider High  []  Major surgery with risk discussed  []  Drug therapy requiring intensive monitoring for toxicity  []  Hospitalization  []  Decision for DNR 60-74  40-54      Follow up in about 1 year (around 8/8/2023).    Arpita Zamarripa MD, FAAD  [facility]

## 2023-01-31 ENCOUNTER — OFFICE VISIT (OUTPATIENT)
Dept: ORTHOPEDICS | Facility: CLINIC | Age: 68
End: 2023-01-31
Payer: MEDICARE

## 2023-01-31 ENCOUNTER — HOSPITAL ENCOUNTER (OUTPATIENT)
Dept: RADIOLOGY | Facility: HOSPITAL | Age: 68
Discharge: HOME | End: 2023-01-31
Attending: STUDENT IN AN ORGANIZED HEALTH CARE EDUCATION/TRAINING PROGRAM
Payer: MEDICARE

## 2023-01-31 VITALS — WEIGHT: 127 LBS | BODY MASS INDEX: 24.94 KG/M2 | HEIGHT: 60 IN

## 2023-01-31 DIAGNOSIS — M25.532 LEFT WRIST PAIN: ICD-10-CM

## 2023-01-31 DIAGNOSIS — M79.632 LEFT FOREARM PAIN: ICD-10-CM

## 2023-01-31 DIAGNOSIS — M79.632 LEFT FOREARM PAIN: Primary | ICD-10-CM

## 2023-01-31 DIAGNOSIS — M25.532 LEFT WRIST PAIN: Primary | ICD-10-CM

## 2023-01-31 DIAGNOSIS — S52.592A OTHER CLOSED FRACTURE OF DISTAL END OF LEFT RADIUS, INITIAL ENCOUNTER: Primary | ICD-10-CM

## 2023-01-31 PROCEDURE — 3288F PR FALLS RISK ASSESSMENT DOCUMENTED: ICD-10-PCS | Mod: CPTII,S$GLB,, | Performed by: STUDENT IN AN ORGANIZED HEALTH CARE EDUCATION/TRAINING PROGRAM

## 2023-01-31 PROCEDURE — 3008F PR BODY MASS INDEX (BMI) DOCUMENTED: ICD-10-PCS | Mod: CPTII,S$GLB,, | Performed by: STUDENT IN AN ORGANIZED HEALTH CARE EDUCATION/TRAINING PROGRAM

## 2023-01-31 PROCEDURE — 1160F PR REVIEW ALL MEDS BY PRESCRIBER/CLIN PHARMACIST DOCUMENTED: ICD-10-PCS | Mod: CPTII,S$GLB,, | Performed by: STUDENT IN AN ORGANIZED HEALTH CARE EDUCATION/TRAINING PROGRAM

## 2023-01-31 PROCEDURE — 97760 PR ORTHOTIC MGMT&TRAINJ INITIAL ENC EA 15 MINS: ICD-10-PCS | Mod: GP,S$GLB,, | Performed by: STUDENT IN AN ORGANIZED HEALTH CARE EDUCATION/TRAINING PROGRAM

## 2023-01-31 PROCEDURE — 99214 OFFICE O/P EST MOD 30 MIN: CPT | Mod: 25,S$GLB,, | Performed by: STUDENT IN AN ORGANIZED HEALTH CARE EDUCATION/TRAINING PROGRAM

## 2023-01-31 PROCEDURE — 99999 PR PBB SHADOW E&M-EST. PATIENT-LVL III: ICD-10-PCS | Mod: PBBFAC,,, | Performed by: STUDENT IN AN ORGANIZED HEALTH CARE EDUCATION/TRAINING PROGRAM

## 2023-01-31 PROCEDURE — 3008F BODY MASS INDEX DOCD: CPT | Mod: CPTII,S$GLB,, | Performed by: STUDENT IN AN ORGANIZED HEALTH CARE EDUCATION/TRAINING PROGRAM

## 2023-01-31 PROCEDURE — 73090 XR FOREARM LEFT: ICD-10-PCS | Mod: 26,LT,, | Performed by: RADIOLOGY

## 2023-01-31 PROCEDURE — 1160F RVW MEDS BY RX/DR IN RCRD: CPT | Mod: CPTII,S$GLB,, | Performed by: STUDENT IN AN ORGANIZED HEALTH CARE EDUCATION/TRAINING PROGRAM

## 2023-01-31 PROCEDURE — 73090 X-RAY EXAM OF FOREARM: CPT | Mod: TC,LT

## 2023-01-31 PROCEDURE — 3288F FALL RISK ASSESSMENT DOCD: CPT | Mod: CPTII,S$GLB,, | Performed by: STUDENT IN AN ORGANIZED HEALTH CARE EDUCATION/TRAINING PROGRAM

## 2023-01-31 PROCEDURE — 73110 X-RAY EXAM OF WRIST: CPT | Mod: TC,LT

## 2023-01-31 PROCEDURE — 1100F PTFALLS ASSESS-DOCD GE2>/YR: CPT | Mod: CPTII,S$GLB,, | Performed by: STUDENT IN AN ORGANIZED HEALTH CARE EDUCATION/TRAINING PROGRAM

## 2023-01-31 PROCEDURE — 97760 ORTHOTIC MGMT&TRAING 1ST ENC: CPT | Mod: GP,S$GLB,, | Performed by: STUDENT IN AN ORGANIZED HEALTH CARE EDUCATION/TRAINING PROGRAM

## 2023-01-31 PROCEDURE — 73110 X-RAY EXAM OF WRIST: CPT | Mod: 26,LT,, | Performed by: RADIOLOGY

## 2023-01-31 PROCEDURE — 99999 PR PBB SHADOW E&M-EST. PATIENT-LVL III: CPT | Mod: PBBFAC,,, | Performed by: STUDENT IN AN ORGANIZED HEALTH CARE EDUCATION/TRAINING PROGRAM

## 2023-01-31 PROCEDURE — 1125F AMNT PAIN NOTED PAIN PRSNT: CPT | Mod: CPTII,S$GLB,, | Performed by: STUDENT IN AN ORGANIZED HEALTH CARE EDUCATION/TRAINING PROGRAM

## 2023-01-31 PROCEDURE — 1100F PR PT FALLS ASSESS DOC 2+ FALLS/FALL W/INJURY/YR: ICD-10-PCS | Mod: CPTII,S$GLB,, | Performed by: STUDENT IN AN ORGANIZED HEALTH CARE EDUCATION/TRAINING PROGRAM

## 2023-01-31 PROCEDURE — 1159F MED LIST DOCD IN RCRD: CPT | Mod: CPTII,S$GLB,, | Performed by: STUDENT IN AN ORGANIZED HEALTH CARE EDUCATION/TRAINING PROGRAM

## 2023-01-31 PROCEDURE — 1159F PR MEDICATION LIST DOCUMENTED IN MEDICAL RECORD: ICD-10-PCS | Mod: CPTII,S$GLB,, | Performed by: STUDENT IN AN ORGANIZED HEALTH CARE EDUCATION/TRAINING PROGRAM

## 2023-01-31 PROCEDURE — 99214 PR OFFICE/OUTPT VISIT, EST, LEVL IV, 30-39 MIN: ICD-10-PCS | Mod: 25,S$GLB,, | Performed by: STUDENT IN AN ORGANIZED HEALTH CARE EDUCATION/TRAINING PROGRAM

## 2023-01-31 PROCEDURE — 73110 XR WRIST COMPLETE 3 VIEWS LEFT: ICD-10-PCS | Mod: 26,LT,, | Performed by: RADIOLOGY

## 2023-01-31 PROCEDURE — 73090 X-RAY EXAM OF FOREARM: CPT | Mod: 26,LT,, | Performed by: RADIOLOGY

## 2023-01-31 PROCEDURE — 1125F PR PAIN SEVERITY QUANTIFIED, PAIN PRESENT: ICD-10-PCS | Mod: CPTII,S$GLB,, | Performed by: STUDENT IN AN ORGANIZED HEALTH CARE EDUCATION/TRAINING PROGRAM

## 2023-01-31 NOTE — PROGRESS NOTES
Orthopaedics Sports Medicine     Wrist Initial Visit         2023    Referring MD: Self, Aaareferral    Chief Complaint   Patient presents with    Left Forearm - Pain, Injury         History of Present Illness:   Jaylyn May is a 67 y.o. right-hand dominant female who presents with left wrist pain and dysfunction.    Onset of the symptoms was 23.      Inciting event: She had a fall while roller skating and fell onto her left outstretched hand. She was seen at [facility] on  and had XR revealed distal radius fracture. She was given a brace and Tramadol.      Current symptoms include left wrist pain.      Pain is aggravated by movement.      Evaluation to date: X-Ray,     Treatment to date: Rest, activity modification, brace, Tramadol    Of note she has a history of right distal radius fracture over 2 years ago. She also takes Boniva for bone health.     Past Medical History:   Past Medical History:   Diagnosis Date    Abnormal Pap smear     over 15 years     Pituitary cyst     Supraventricular tachycardia        Past Surgical History:   Past Surgical History:   Procedure Laterality Date     SECTION      MANDIBLE FRACTURE SURGERY         Medications:  Patient's Medications   New Prescriptions    No medications on file   Previous Medications    ASPIRIN (ECOTRIN) 81 MG EC TABLET    Take 81 mg by mouth once daily.    BIOTIN ORAL    Take 1 tablet by mouth once daily.     CETIRIZINE (ZYRTEC) 10 MG TABLET    Take 10 mg by mouth as needed.     DIPHENHYDRAMINE-ACETAMINOPHEN (TYLENOL PM)  MG TAB    Take 1 tablet by mouth nightly.    GARLIC EXTRACT ORAL    Take 1 capsule by mouth once daily.    IBANDRONATE (BONIVA) 150 MG TABLET    take one tablet by mouth every 30 days as directed    LORAZEPAM (ATIVAN) 1 MG TABLET    Take one tablet by mouth every 6 (six) hours as needed.    MELATONIN 10 MG TAB    Take 20 mg by mouth every evening.    MELOXICAM (MOBIC) 7.5 MG TABLET    Take 7.5 mg by mouth  daily as needed for Pain.    METOPROLOL SUCCINATE (TOPROL-XL) 25 MG 24 HR TABLET    TAKE 1/2 TABLET ONCE DAILY    RIZATRIPTAN (MAXALT) 10 MG TABLET    Take 10 mg by mouth as needed for Migraine.    SIMVASTATIN (ZOCOR) 20 MG TABLET    Take 1 tablet by mouth once daily.    VENLAFAXINE (EFFEXOR-XR) 150 MG CP24    Take 150 mg by mouth once daily.    VENLAFAXINE (EFFEXOR-XR) 75 MG 24 HR CAPSULE    Take 75 mg by mouth 2 (two) times daily.    Modified Medications    No medications on file   Discontinued Medications    No medications on file       Allergies:   Review of patient's allergies indicates:   Allergen Reactions    Penicillins      rash       Social History:   Home town: [place], [place]  Occupation: retired   Alcohol use: She reports current alcohol use of about 7.0 standard drinks per week.  Tobacco use: She reports that she has never smoked. She has never used smokeless tobacco.    Review of systems:  History of recent illness, fevers, shakes, or chills: no  History of cardiac problems or chest pain: yes (SVT)  History of pulmonary problems or asthma: no  History of diabetes: no  History of prior dvt or clotting problems: no  History of sleep apnea: no      Physical Examination:  Estimated body mass index is 24.8 kg/m² as calculated from the following:    Height as of this encounter: 5' (1.524 m).    Weight as of this encounter: 57.6 kg (127 lb).    General  Healthy appearing female in no acute distress  Alert and oriented, normal mood, appropriate affect    Shoulder Examination:  Patient is alert and oriented, no distress. Skin is intact. Neuro is normal with no focal motor or sensory findings.    Cervical exam is unremarkable. Intact cervical ROM. Negative Spurling's test    Left Wrist:    Inspection: Bruising noted along distal forearm.     Palpation: Distal radius     Range of motion: tolerates gentle range of motion of fingers, hand, wrist      Neurovascular examination  - Motor grossly intact bilaterally to  shoulder abduction, elbow flexion and extension, wrist flexion and extension, and intrinsic hand musculature  - Sensation intact to light touch bilaterally in axillary, median, radial, and ulnar distributions  - Symmetrical radial pulses      Imaging:  XR Results:  X-Ray Wrist Complete Left  Narrative: EXAM:  XR WRIST COMPLETE 3 VIEWS LEFT    INDICATIONS: Left wrist pain    COMPARISONS: none    FINDINGS:      6 images are obtained.  There is a transverse fracture through the distal radius with cortical disruption dorsally.  The ulna and adjacent wrist appear normal.  Impression:   Distal radius fracture with minimal dislocation (there is sclerosis along the fracture line suggesting that this may not be an acute injury)    Finalized on: 1/31/2023 12:51 PM By:  Raul Burch MD  [facility]# [id number]      2023-01-31 12:54:02.023    [facility]  X-Ray Forearm Left  Narrative: EXAM: XR FOREARM LEFT    CLINICAL HISTORY: Left arm pain    COMPARISON:  No studies are available for comparison.    FINDINGS: Impacted, intra-articular fracture through the distal radius with a sclerotic margin suggesting a subacute fracture.  AP alignment is within normal limits.  Osteopenia.  Soft tissue swelling surrounding the left wrist.  Impression:  Distal radial metaphyseal fracture.    Finalized on: 1/31/2023 10:38 AM By:  UDAY Sesay MD, MD  [facility]# [id number]      2023-01-31 10:41:04.328    [facility]          MRI Results:  No results found for this or any previous visit.      CT Results:  No results found for this or any previous visit.      Physician Read: I agree with the above impression.      Impression:  67 y.o. female with left distal radius fracture, mildly displaced      Plan:  Discussed diagnosis and treatment options with patient today. Her XR shows left distal radius fracture that is mildly displaced.  Discussed non-operative treatment options in the form of rest, activity modifications, and bracing versus potential operative treatment. Reviewed  pros and cons of each treatment option.   Based on her age and injury to non-dominant wrist, I expect similar outcomes between ORIF and immobilization though she may have faster initial progress with ORIF. She would like to avoid operative treatment if possible.    I recommend proceeding with left wrist immobilization in EXOS brace. Under the direction of Guy Lynn MD, 15 minutes were spent sizing, fitting, and educating for durable medical equipment application today.  CPT 36153.  Follow up in 1 week with XR.            Guy Lynn MD

## 2023-01-31 NOTE — PROGRESS NOTES
Orthopaedics Sports Medicine     Shoulder Initial Visit         2023    Referring MD: Self, Aaareferral    Chief Complaint   Patient presents with    Left Forearm - Pain, Injury         History of Present Illness:   Jaylyn May is a 67 y.o. ***-hand dominant female who presents with *** shoulder pain and dysfunction.    Onset of the symptoms was ***     Inciting event:***     Current symptoms include ***     Pain is aggravated by ***      Evaluation to date: X-Ray, ***     Treatment to date: Rest, activity modification, ***     Past Medical History:   Past Medical History:   Diagnosis Date    Abnormal Pap smear     over 15 years     Pituitary cyst     Supraventricular tachycardia        Past Surgical History:   Past Surgical History:   Procedure Laterality Date     SECTION      MANDIBLE FRACTURE SURGERY         Medications:  Patient's Medications   New Prescriptions    No medications on file   Previous Medications    ASPIRIN (ECOTRIN) 81 MG EC TABLET    Take 81 mg by mouth once daily.    BIOTIN ORAL    Take 1 tablet by mouth once daily.     CETIRIZINE (ZYRTEC) 10 MG TABLET    Take 10 mg by mouth as needed.     DIPHENHYDRAMINE-ACETAMINOPHEN (TYLENOL PM)  MG TAB    Take 1 tablet by mouth nightly.    GARLIC EXTRACT ORAL    Take 1 capsule by mouth once daily.    IBANDRONATE (BONIVA) 150 MG TABLET    take one tablet by mouth every 30 days as directed    LORAZEPAM (ATIVAN) 1 MG TABLET    Take one tablet by mouth every 6 (six) hours as needed.    MELATONIN 10 MG TAB    Take 20 mg by mouth every evening.    MELOXICAM (MOBIC) 7.5 MG TABLET    Take 7.5 mg by mouth daily as needed for Pain.    METOPROLOL SUCCINATE (TOPROL-XL) 25 MG 24 HR TABLET    TAKE 1/2 TABLET ONCE DAILY    RIZATRIPTAN (MAXALT) 10 MG TABLET    Take 10 mg by mouth as needed for Migraine.    SIMVASTATIN (ZOCOR) 20 MG TABLET    Take 1 tablet by mouth once daily.    VENLAFAXINE (EFFEXOR-XR) 150 MG CP24    Take 150 mg by mouth once daily.     VENLAFAXINE (EFFEXOR-XR) 75 MG 24 HR CAPSULE    Take 75 mg by mouth 2 (two) times daily.    Modified Medications    No medications on file   Discontinued Medications    No medications on file       Allergies:   Review of patient's allergies indicates:   Allergen Reactions    Penicillins      rash       Social History:   Home town: [place], [place]  Occupation: retired   Alcohol use: She reports current alcohol use of about 7.0 standard drinks per week.  Tobacco use: She reports that she has never smoked. She has never used smokeless tobacco.    Review of systems:  History of recent illness, fevers, shakes, or chills: no  History of cardiac problems or chest pain: yes (SVT)  History of pulmonary problems or asthma: no  History of diabetes: no  History of prior dvt or clotting problems: no  History of sleep apnea: no      Physical Examination:  Estimated body mass index is 24.8 kg/m² as calculated from the following:    Height as of this encounter: 5' (1.524 m).    Weight as of this encounter: 57.6 kg (127 lb).    General  Healthy appearing female in no acute distress  Alert and oriented, normal mood, appropriate affect    Shoulder Examination:  Patient is alert and oriented, no distress. Skin is intact. Neuro is normal with no focal motor or sensory findings.    Cervical exam is unremarkable. Intact cervical ROM. Negative Spurling's test    Physical Exam:  RIGHT    LEFT    Scap Dyskinesis/Winging (-)    (-)    Tenderness:          Greater Tuberosity             (-)    (-)  Bicipital Groove  (-)    (-)  AC joint   (-)    (-)  Other:     ROM:  Forward Elevation 180***    180***  Abduction  120***    120***  ER (at side)  80***    80***  IR   T8***    T8***    Strength:   Supraspinatus  5/5    5/5  Infraspinatus  5/5    5/5  Subscap / IR  5/5    5/5     Special Tests:   Apprehension:   (-)    (-)   Gary Relocation:  (-)    (-)   Jerk / Posterior Load:  (-)    (-)   Neer:    (-)    (-)   [place]:   (-)    (-)   SS  Stress:   (-)    (-)   Bear Hug:   (-)    (-)   [place]'s:   (-)    (-)   Resisted Thrower's:   (-)    (-)   Cross Arm Abduction:  (-)    (-)    Neurovascular examination  - Motor grossly intact bilaterally to shoulder abduction, elbow flexion and extension, wrist flexion and extension, and intrinsic hand musculature  - Sensation intact to light touch bilaterally in axillary, median, radial, and ulnar distributions  - Symmetrical radial pulses      Imaging:  XR Results:  No results found for this or any previous visit.      MRI Results:  No results found for this or any previous visit.      CT Results:  No results found for this or any previous visit.      Physician Read: I agree with the above impression.***      Impression:  67 y.o. female with ***      Plan:  Discussed diagnosis and treatment options with patient today. ***  Follow up ***           Guy Lynn MD

## 2023-02-05 NOTE — PROGRESS NOTES
Orthopaedic Follow-Up Visit    Last Appointment: 23  Diagnosis: Left distal radius fracture, mildly displaced  Prior Procedure: Wrist brace    Jaylyn May is a 67 y.o. female who is here for f/u evaluation of her left wrist. The patient was last seen here by me on 23 at which point we decided to proceed with non-operative management of her left distal radius fracture.  She was provided with an EXOS wrist brace. The patient returns today reporting that the symptoms *** and is interested in discussing further treatment options.     To review her history, Jaylyn May is a 67 y.o. right-hand dominant female who presented with left wrist pain and dysfunction that began on 23 when she fell while roller skating and fell onto her left outstretched hand. XR revealed left distal radisus fracture that was mildly displaced. She elected to proceed with non-operative management so she was provided with an EXOS wrist brace.     Patient's medications, allergies, past medical, surgical, social and family histories were reviewed and updated as appropriate.    Review of Systems   All systems reviewed were negative.  Specifically, the patient denies fever, chills, weight loss, chest pain, shortness of breath, or dyspnea on exertion.      Past Medical History:   Diagnosis Date    Abnormal Pap smear     over 15 years     Pituitary cyst     Supraventricular tachycardia        Past Surgical History:   Procedure Laterality Date     SECTION      MANDIBLE FRACTURE SURGERY         Patient's Medications   New Prescriptions    No medications on file   Previous Medications    ASPIRIN (ECOTRIN) 81 MG EC TABLET    Take 81 mg by mouth once daily.    BIOTIN ORAL    Take 1 tablet by mouth once daily.     CETIRIZINE (ZYRTEC) 10 MG TABLET    Take 10 mg by mouth as needed.     DIPHENHYDRAMINE-ACETAMINOPHEN (TYLENOL PM)  MG TAB    Take 1 tablet by mouth nightly.    GARLIC EXTRACT ORAL    Take 1 capsule by mouth once  daily.    IBANDRONATE (BONIVA) 150 MG TABLET    take one tablet by mouth every 30 days as directed    LORAZEPAM (ATIVAN) 1 MG TABLET    Take one tablet by mouth every 6 (six) hours as needed.    MELATONIN 10 MG TAB    Take 20 mg by mouth every evening.    MELOXICAM (MOBIC) 7.5 MG TABLET    Take 7.5 mg by mouth daily as needed for Pain.    METOPROLOL SUCCINATE (TOPROL-XL) 25 MG 24 HR TABLET    TAKE 1/2 TABLET ONCE DAILY    RIZATRIPTAN (MAXALT) 10 MG TABLET    Take 10 mg by mouth as needed for Migraine.    SIMVASTATIN (ZOCOR) 20 MG TABLET    Take 1 tablet by mouth once daily.    VENLAFAXINE (EFFEXOR-XR) 150 MG CP24    Take 150 mg by mouth once daily.    VENLAFAXINE (EFFEXOR-XR) 75 MG 24 HR CAPSULE    Take 75 mg by mouth 2 (two) times daily.    Modified Medications    No medications on file   Discontinued Medications    No medications on file       Family History   Problem Relation Age of Onset    Hypertension Mother     Cataracts Mother     Blindness Maternal Aunt     Hypertension Maternal Aunt     Cataracts Maternal Aunt     Breast cancer Sister     Cancer Maternal Grandmother     Breast cancer Maternal Grandmother     Leukemia Paternal Aunt        Review of patient's allergies indicates:   Allergen Reactions    Penicillins      rash         Objective:      Physical Exam  Patient is alert and oriented, no distress. Skin is intact. Neuro is normal with no focal motor or sensory findings.      Left Wrist Exam:    Inspection: Bruising noted along distal forearm.      Palpation: Distal radius      Range of motion: tolerates gentle range of motion of fingers, hand, wrist    Neurovascular examination  - Motor grossly intact bilaterally to shoulder abduction, elbow flexion and extension, wrist flexion and extension, and intrinsic hand musculature  - Sensation intact to light touch bilaterally in axillary, median, radial, and ulnar distributions  - Symmetrical radial pulses    Imaging:    XR Results:  ***      MRI  Results:  No results found for this or any previous visit.      CT Results:  No results found for this or any previous visit.      Physician read: I agree with the above impression.***    Assessment/Plan:   Jaylyn May is a 67 y.o. female with left distal radius fracture, mildly displaced ***    Plan:    ***  Follow up ***          Guy Lynn MD    I, Kyle León, acted as a scribe for Guy Lynn MD for the duration of this office visit.

## 2023-02-06 ENCOUNTER — PATIENT MESSAGE (OUTPATIENT)
Dept: ORTHOPEDICS | Facility: CLINIC | Age: 68
End: 2023-02-06
Payer: MEDICARE

## 2023-02-06 DIAGNOSIS — M25.531 RIGHT WRIST PAIN: Primary | ICD-10-CM

## 2023-02-07 ENCOUNTER — HOSPITAL ENCOUNTER (OUTPATIENT)
Dept: RADIOLOGY | Facility: HOSPITAL | Age: 68
Discharge: HOME | End: 2023-02-07
Attending: STUDENT IN AN ORGANIZED HEALTH CARE EDUCATION/TRAINING PROGRAM
Payer: MEDICARE

## 2023-02-07 ENCOUNTER — OFFICE VISIT (OUTPATIENT)
Dept: ORTHOPEDICS | Facility: CLINIC | Age: 68
End: 2023-02-07
Payer: MEDICARE

## 2023-02-07 VITALS — HEIGHT: 60 IN | BODY MASS INDEX: 24.94 KG/M2 | WEIGHT: 127 LBS

## 2023-02-07 DIAGNOSIS — M25.532 LEFT WRIST PAIN: ICD-10-CM

## 2023-02-07 DIAGNOSIS — M25.531 RIGHT WRIST PAIN: ICD-10-CM

## 2023-02-07 DIAGNOSIS — S52.592A OTHER CLOSED FRACTURE OF DISTAL END OF LEFT RADIUS, INITIAL ENCOUNTER: ICD-10-CM

## 2023-02-07 DIAGNOSIS — S52.592D OTHER CLOSED FRACTURE OF DISTAL END OF LEFT RADIUS WITH ROUTINE HEALING, SUBSEQUENT ENCOUNTER: Primary | ICD-10-CM

## 2023-02-07 PROCEDURE — 73110 XR WRIST COMPLETE 3 VIEWS RIGHT: ICD-10-PCS | Mod: 26,RT,, | Performed by: RADIOLOGY

## 2023-02-07 PROCEDURE — 73110 X-RAY EXAM OF WRIST: CPT | Mod: 26,LT,, | Performed by: RADIOLOGY

## 2023-02-07 PROCEDURE — 99999 PR PBB SHADOW E&M-EST. PATIENT-LVL III: CPT | Mod: PBBFAC,,, | Performed by: STUDENT IN AN ORGANIZED HEALTH CARE EDUCATION/TRAINING PROGRAM

## 2023-02-07 PROCEDURE — 1159F PR MEDICATION LIST DOCUMENTED IN MEDICAL RECORD: ICD-10-PCS | Mod: CPTII,S$GLB,, | Performed by: STUDENT IN AN ORGANIZED HEALTH CARE EDUCATION/TRAINING PROGRAM

## 2023-02-07 PROCEDURE — 3288F PR FALLS RISK ASSESSMENT DOCUMENTED: ICD-10-PCS | Mod: CPTII,S$GLB,, | Performed by: STUDENT IN AN ORGANIZED HEALTH CARE EDUCATION/TRAINING PROGRAM

## 2023-02-07 PROCEDURE — 1101F PT FALLS ASSESS-DOCD LE1/YR: CPT | Mod: CPTII,S$GLB,, | Performed by: STUDENT IN AN ORGANIZED HEALTH CARE EDUCATION/TRAINING PROGRAM

## 2023-02-07 PROCEDURE — 1125F PR PAIN SEVERITY QUANTIFIED, PAIN PRESENT: ICD-10-PCS | Mod: CPTII,S$GLB,, | Performed by: STUDENT IN AN ORGANIZED HEALTH CARE EDUCATION/TRAINING PROGRAM

## 2023-02-07 PROCEDURE — 1159F MED LIST DOCD IN RCRD: CPT | Mod: CPTII,S$GLB,, | Performed by: STUDENT IN AN ORGANIZED HEALTH CARE EDUCATION/TRAINING PROGRAM

## 2023-02-07 PROCEDURE — 73110 X-RAY EXAM OF WRIST: CPT | Mod: TC,LT

## 2023-02-07 PROCEDURE — 99214 PR OFFICE/OUTPT VISIT, EST, LEVL IV, 30-39 MIN: ICD-10-PCS | Mod: S$GLB,,, | Performed by: STUDENT IN AN ORGANIZED HEALTH CARE EDUCATION/TRAINING PROGRAM

## 2023-02-07 PROCEDURE — 1160F RVW MEDS BY RX/DR IN RCRD: CPT | Mod: CPTII,S$GLB,, | Performed by: STUDENT IN AN ORGANIZED HEALTH CARE EDUCATION/TRAINING PROGRAM

## 2023-02-07 PROCEDURE — 99999 PR PBB SHADOW E&M-EST. PATIENT-LVL III: ICD-10-PCS | Mod: PBBFAC,,, | Performed by: STUDENT IN AN ORGANIZED HEALTH CARE EDUCATION/TRAINING PROGRAM

## 2023-02-07 PROCEDURE — 73110 X-RAY EXAM OF WRIST: CPT | Mod: 26,RT,, | Performed by: RADIOLOGY

## 2023-02-07 PROCEDURE — 3008F BODY MASS INDEX DOCD: CPT | Mod: CPTII,S$GLB,, | Performed by: STUDENT IN AN ORGANIZED HEALTH CARE EDUCATION/TRAINING PROGRAM

## 2023-02-07 PROCEDURE — 1101F PR PT FALLS ASSESS DOC 0-1 FALLS W/OUT INJ PAST YR: ICD-10-PCS | Mod: CPTII,S$GLB,, | Performed by: STUDENT IN AN ORGANIZED HEALTH CARE EDUCATION/TRAINING PROGRAM

## 2023-02-07 PROCEDURE — 3008F PR BODY MASS INDEX (BMI) DOCUMENTED: ICD-10-PCS | Mod: CPTII,S$GLB,, | Performed by: STUDENT IN AN ORGANIZED HEALTH CARE EDUCATION/TRAINING PROGRAM

## 2023-02-07 PROCEDURE — 99214 OFFICE O/P EST MOD 30 MIN: CPT | Mod: S$GLB,,, | Performed by: STUDENT IN AN ORGANIZED HEALTH CARE EDUCATION/TRAINING PROGRAM

## 2023-02-07 PROCEDURE — 3288F FALL RISK ASSESSMENT DOCD: CPT | Mod: CPTII,S$GLB,, | Performed by: STUDENT IN AN ORGANIZED HEALTH CARE EDUCATION/TRAINING PROGRAM

## 2023-02-07 PROCEDURE — 1160F PR REVIEW ALL MEDS BY PRESCRIBER/CLIN PHARMACIST DOCUMENTED: ICD-10-PCS | Mod: CPTII,S$GLB,, | Performed by: STUDENT IN AN ORGANIZED HEALTH CARE EDUCATION/TRAINING PROGRAM

## 2023-02-07 PROCEDURE — 1125F AMNT PAIN NOTED PAIN PRSNT: CPT | Mod: CPTII,S$GLB,, | Performed by: STUDENT IN AN ORGANIZED HEALTH CARE EDUCATION/TRAINING PROGRAM

## 2023-02-07 PROCEDURE — 73110 X-RAY EXAM OF WRIST: CPT | Mod: TC,RT

## 2023-02-07 RX ORDER — TRAMADOL HYDROCHLORIDE 50 MG/1
50 TABLET ORAL EVERY 6 HOURS PRN
Status: CANCELLED | OUTPATIENT
Start: 2023-02-07

## 2023-02-07 RX ORDER — TRAMADOL HYDROCHLORIDE 50 MG/1
50 TABLET ORAL EVERY 6 HOURS PRN
Qty: 24 TABLET | Refills: 0 | Status: SHIPPED | OUTPATIENT
Start: 2023-02-07 | End: 2023-02-27 | Stop reason: SDUPTHER

## 2023-02-07 NOTE — PROGRESS NOTES
Orthopaedic Follow-Up Visit    Last Appointment: 23  Diagnosis: Left distal radius fracture, mildly displaced  Prior Procedure: EXOS immobilization    Jaylyn May is a 67 y.o. female who is here for f/u evaluation of her left wrist. The patient was last seen here by me on 23 at which point we decided to proceed with non-operative management of her left distal radius fracture.  She was provided with an EXOS wrist brace. The patient returns today reporting that the symptoms have slightly improved but the wrist is still quite sore and is interested in discussing further treatment options.     To review her history, Jaylyn May is a 67 y.o. right-hand dominant female who presented with left wrist pain and dysfunction that began on 23 when she fell while roller skating and fell onto her left outstretched hand. XR revealed left distal radisus fracture that was mildly displaced. She elected to proceed with non-operative management so she was provided with an EXOS wrist brace.     Patient's medications, allergies, past medical, surgical, social and family histories were reviewed and updated as appropriate.    Review of Systems   All systems reviewed were negative.  Specifically, the patient denies fever, chills, weight loss, chest pain, shortness of breath, or dyspnea on exertion.      Past Medical History:   Diagnosis Date    Abnormal Pap smear     over 15 years     Pituitary cyst     Supraventricular tachycardia        Past Surgical History:   Procedure Laterality Date     SECTION      MANDIBLE FRACTURE SURGERY         Patient's Medications   New Prescriptions    No medications on file   Previous Medications    ASPIRIN (ECOTRIN) 81 MG EC TABLET    Take 81 mg by mouth once daily.    BIOTIN ORAL    Take 1 tablet by mouth once daily.     CETIRIZINE (ZYRTEC) 10 MG TABLET    Take 10 mg by mouth as needed.     DIPHENHYDRAMINE-ACETAMINOPHEN (TYLENOL PM)  MG TAB    Take 1 tablet by mouth  nightly.    GARLIC EXTRACT ORAL    Take 1 capsule by mouth once daily.    IBANDRONATE (BONIVA) 150 MG TABLET    take one tablet by mouth every 30 days as directed    LORAZEPAM (ATIVAN) 1 MG TABLET    Take one tablet by mouth every 6 (six) hours as needed.    MELATONIN 10 MG TAB    Take 20 mg by mouth every evening.    MELOXICAM (MOBIC) 7.5 MG TABLET    Take 7.5 mg by mouth daily as needed for Pain.    METOPROLOL SUCCINATE (TOPROL-XL) 25 MG 24 HR TABLET    TAKE 1/2 TABLET ONCE DAILY    RIZATRIPTAN (MAXALT) 10 MG TABLET    Take 10 mg by mouth as needed for Migraine.    SIMVASTATIN (ZOCOR) 20 MG TABLET    Take 1 tablet by mouth once daily.    VENLAFAXINE (EFFEXOR-XR) 150 MG CP24    Take 150 mg by mouth once daily.    VENLAFAXINE (EFFEXOR-XR) 75 MG 24 HR CAPSULE    Take 75 mg by mouth 2 (two) times daily.    Modified Medications    No medications on file   Discontinued Medications    No medications on file       Family History   Problem Relation Age of Onset    Hypertension Mother     Cataracts Mother     Blindness Maternal Aunt     Hypertension Maternal Aunt     Cataracts Maternal Aunt     Breast cancer Sister     Cancer Maternal Grandmother     Breast cancer Maternal Grandmother     Leukemia Paternal Aunt        Review of patient's allergies indicates:   Allergen Reactions    Penicillins      rash         Objective:      Physical Exam  Patient is alert and oriented, no distress. Skin is intact. Neuro is normal with no focal motor or sensory findings.      Left Wrist Exam:    Inspection: Bruising noted along distal forearm.      Palpation: tenderness over distal radius      Range of motion: tolerates gentle range of motion of fingers, hand, wrist    Neurovascular examination  - Motor grossly intact bilaterally to shoulder abduction, elbow flexion and extension, wrist flexion and extension, and intrinsic hand musculature  - Sensation intact to light touch bilaterally in axillary, median, radial, and ulnar  distributions  - Symmetrical radial pulses    Imaging:    XR Results:  X-Ray Wrist Complete Right  Narrative: EXAM:  XR WRIST COMPLETE 3 VIEWS RIGHT    CLINICAL HISTORY: Right wrist pain.    FINDINGS: 5 views of the right wrist.  No comparison.      No fracture is identified.  Joint alignment is anatomic.       Mild osteopenia.  Scattered degenerative changes.  Impression:   No acute radiographic abnormality of the right wrist.    Finalized on: 2/7/2023 10:12 AM By:  Dev Jay MD  [facility]# [id number]      2023-02-07 10:14:52.984    [facility]  X-Ray Wrist Complete Left  Narrative: EXAM:  XR WRIST COMPLETE 3 VIEWS LEFT    CLINICAL HISTORY:  Fracture    FINDINGS: 5 views left wrist.  Comparison 01/30/2023.      Fracture of the radial metaphysis with minimal dorsal angulation.  Sclerosis within the fracture line suggests callus formation or interval healing.  Joint alignment is anatomic.  Moderate degenerative changes of the first carpometacarpal joint and radiocarpal joint.  Mild osteopenia.  Impression:   See findings above.    Finalized on: 2/7/2023 10:11 AM By:  Dev Jay MD  [facility]# [id number]      2023-02-07 10:13:53.890    [facility]          MRI Results:  No results found for this or any previous visit.      CT Results:  No results found for this or any previous visit.      Physician read: I agree with the above impression.    Assessment/Plan:   Jaylyn May is a 67 y.o. female with left distal radius fracture, mild dorsal displacement    Plan:    Discussed diagnosis and treatment options with patient today. Her XR shows known left distal radius fracture that is mildly displaced but has maintained acceptable alignment.  She does have some slight dorsal displacement but is acceptable considering her age over 66 and non-dominant hand. She would like to continue with non-operative management if possible and I anticipate she will have a similar outcome to surgical management so we will proceed with non-operative treatment.  She should continue to wear the EXOS brace for another 1 month.  Tramadol will be sent to the pharmacy.  Follow up 1 month with xray of the left wrist.          Guy Lynn MD    I, Arabella Umanzor, acted as a scribe for Guy Lynn MD for the duration of this office visit.

## 2023-03-02 NOTE — PROGRESS NOTES
Orthopaedic Follow-Up Visit    Last Appointment: 23  Diagnosis: Left distal radius fracture, mildly displaced  Prior Procedure: Continue EXOS immobilization    Jaylyn May is a 67 y.o. female who is here for f/u evaluation of her left wrist. The patient was last seen here by me on 23 at which point we decided to proceed with non-operative management of her left distal radius fracture.  She was was to continue with her EXOS wrist brace. The patient returns today reporting that the symptoms *** and is interested in discussing further treatment options.     To review her history, Jaylyn May is a 67 y.o. right-hand dominant female who presented with left wrist pain and dysfunction that began on 23 when she fell while roller skating and fell onto her left outstretched hand. XR revealed left distal radisus fracture that was mildly displaced. She elected to proceed with non-operative management so she was provided with an EXOS wrist brace.     Patient's medications, allergies, past medical, surgical, social and family histories were reviewed and updated as appropriate.    Review of Systems   All systems reviewed were negative.  Specifically, the patient denies fever, chills, weight loss, chest pain, shortness of breath, or dyspnea on exertion.      Past Medical History:   Diagnosis Date    Abnormal Pap smear     over 15 years     Pituitary cyst     Supraventricular tachycardia        Past Surgical History:   Procedure Laterality Date     SECTION      MANDIBLE FRACTURE SURGERY         Patient's Medications   New Prescriptions    No medications on file   Previous Medications    ASPIRIN (ECOTRIN) 81 MG EC TABLET    Take 81 mg by mouth once daily.    BIOTIN ORAL    Take 1 tablet by mouth once daily.     CETIRIZINE (ZYRTEC) 10 MG TABLET    Take 10 mg by mouth as needed.     DIPHENHYDRAMINE-ACETAMINOPHEN (TYLENOL PM)  MG TAB    Take 1 tablet by mouth nightly.    GARLIC EXTRACT ORAL    Take 1  capsule by mouth once daily.    IBANDRONATE (BONIVA) 150 MG TABLET    take one tablet by mouth every 30 days as directed    LORAZEPAM (ATIVAN) 1 MG TABLET    Take one tablet by mouth every 6 (six) hours as needed.    MELATONIN 10 MG TAB    Take 20 mg by mouth every evening.    MELOXICAM (MOBIC) 7.5 MG TABLET    Take 7.5 mg by mouth daily as needed for Pain.    METOPROLOL SUCCINATE (TOPROL-XL) 25 MG 24 HR TABLET    TAKE 1/2 TABLET ONCE DAILY    RIZATRIPTAN (MAXALT) 10 MG TABLET    Take 10 mg by mouth as needed for Migraine.    SIMVASTATIN (ZOCOR) 20 MG TABLET    Take 1 tablet by mouth once daily.    TRAMADOL (ULTRAM) 50 MG TABLET    Take 1 tablet (50 mg total) by mouth every 8 (eight) hours as needed for Pain.    VENLAFAXINE (EFFEXOR-XR) 150 MG CP24    Take 150 mg by mouth once daily.    VENLAFAXINE (EFFEXOR-XR) 75 MG 24 HR CAPSULE    Take 75 mg by mouth 2 (two) times daily.    Modified Medications    No medications on file   Discontinued Medications    No medications on file       Family History   Problem Relation Age of Onset    Hypertension Mother     Cataracts Mother     Blindness Maternal Aunt     Hypertension Maternal Aunt     Cataracts Maternal Aunt     Breast cancer Sister     Cancer Maternal Grandmother     Breast cancer Maternal Grandmother     Leukemia Paternal Aunt        Review of patient's allergies indicates:   Allergen Reactions    Penicillins      rash         Objective:      Physical Exam  Patient is alert and oriented, no distress. Skin is intact. Neuro is normal with no focal motor or sensory findings.      Left Wrist Exam:    Inspection: Bruising noted along distal forearm.      Palpation: tenderness over distal radius      Range of motion: tolerates gentle range of motion of fingers, hand, wrist    Neurovascular examination  - Motor grossly intact bilaterally to shoulder abduction, elbow flexion and extension, wrist flexion and extension, and intrinsic hand musculature  - Sensation intact to  light touch bilaterally in axillary, median, radial, and ulnar distributions  - Symmetrical radial pulses    Imaging:    XR Results:  ***       MRI Results:  No results found for this or any previous visit.      CT Results:  No results found for this or any previous visit.      Physician read: I agree with the above impression.    Assessment/Plan:   Jaylyn May is a 67 y.o. female with left distal radius fracture, mild dorsal displacement    Plan:    Discussed diagnosis and treatment options with patient today. Her XR shows ***  Follow up ***          Guy Lynn MD    I, Kyle León, acted as a scribe for Guy Lynn MD for the duration of this office visit.

## 2023-03-07 ENCOUNTER — OFFICE VISIT (OUTPATIENT)
Dept: ORTHOPEDICS | Facility: CLINIC | Age: 68
End: 2023-03-07
Payer: MEDICARE

## 2023-03-07 ENCOUNTER — HOSPITAL ENCOUNTER (OUTPATIENT)
Dept: RADIOLOGY | Facility: HOSPITAL | Age: 68
Discharge: HOME | End: 2023-03-07
Attending: STUDENT IN AN ORGANIZED HEALTH CARE EDUCATION/TRAINING PROGRAM
Payer: MEDICARE

## 2023-03-07 VITALS — HEIGHT: 60 IN | BODY MASS INDEX: 24.94 KG/M2 | WEIGHT: 127 LBS

## 2023-03-07 DIAGNOSIS — S52.592D OTHER CLOSED FRACTURE OF DISTAL END OF LEFT RADIUS WITH ROUTINE HEALING, SUBSEQUENT ENCOUNTER: Primary | ICD-10-CM

## 2023-03-07 DIAGNOSIS — S52.592D OTHER CLOSED FRACTURE OF DISTAL END OF LEFT RADIUS WITH ROUTINE HEALING, SUBSEQUENT ENCOUNTER: ICD-10-CM

## 2023-03-07 PROCEDURE — 73110 X-RAY EXAM OF WRIST: CPT | Mod: 26,LT,, | Performed by: RADIOLOGY

## 2023-03-07 PROCEDURE — 1125F PR PAIN SEVERITY QUANTIFIED, PAIN PRESENT: ICD-10-PCS | Mod: CPTII,S$GLB,, | Performed by: STUDENT IN AN ORGANIZED HEALTH CARE EDUCATION/TRAINING PROGRAM

## 2023-03-07 PROCEDURE — 99213 OFFICE O/P EST LOW 20 MIN: CPT | Mod: S$GLB,,, | Performed by: STUDENT IN AN ORGANIZED HEALTH CARE EDUCATION/TRAINING PROGRAM

## 2023-03-07 PROCEDURE — 1160F RVW MEDS BY RX/DR IN RCRD: CPT | Mod: CPTII,S$GLB,, | Performed by: STUDENT IN AN ORGANIZED HEALTH CARE EDUCATION/TRAINING PROGRAM

## 2023-03-07 PROCEDURE — 3288F FALL RISK ASSESSMENT DOCD: CPT | Mod: CPTII,S$GLB,, | Performed by: STUDENT IN AN ORGANIZED HEALTH CARE EDUCATION/TRAINING PROGRAM

## 2023-03-07 PROCEDURE — 3008F BODY MASS INDEX DOCD: CPT | Mod: CPTII,S$GLB,, | Performed by: STUDENT IN AN ORGANIZED HEALTH CARE EDUCATION/TRAINING PROGRAM

## 2023-03-07 PROCEDURE — 3288F PR FALLS RISK ASSESSMENT DOCUMENTED: ICD-10-PCS | Mod: CPTII,S$GLB,, | Performed by: STUDENT IN AN ORGANIZED HEALTH CARE EDUCATION/TRAINING PROGRAM

## 2023-03-07 PROCEDURE — 1101F PT FALLS ASSESS-DOCD LE1/YR: CPT | Mod: CPTII,S$GLB,, | Performed by: STUDENT IN AN ORGANIZED HEALTH CARE EDUCATION/TRAINING PROGRAM

## 2023-03-07 PROCEDURE — 73110 XR WRIST COMPLETE 3 VIEWS LEFT: ICD-10-PCS | Mod: 26,LT,, | Performed by: RADIOLOGY

## 2023-03-07 PROCEDURE — 1160F PR REVIEW ALL MEDS BY PRESCRIBER/CLIN PHARMACIST DOCUMENTED: ICD-10-PCS | Mod: CPTII,S$GLB,, | Performed by: STUDENT IN AN ORGANIZED HEALTH CARE EDUCATION/TRAINING PROGRAM

## 2023-03-07 PROCEDURE — 1159F PR MEDICATION LIST DOCUMENTED IN MEDICAL RECORD: ICD-10-PCS | Mod: CPTII,S$GLB,, | Performed by: STUDENT IN AN ORGANIZED HEALTH CARE EDUCATION/TRAINING PROGRAM

## 2023-03-07 PROCEDURE — 99999 PR PBB SHADOW E&M-EST. PATIENT-LVL III: CPT | Mod: PBBFAC,,, | Performed by: STUDENT IN AN ORGANIZED HEALTH CARE EDUCATION/TRAINING PROGRAM

## 2023-03-07 PROCEDURE — 99999 PR PBB SHADOW E&M-EST. PATIENT-LVL III: ICD-10-PCS | Mod: PBBFAC,,, | Performed by: STUDENT IN AN ORGANIZED HEALTH CARE EDUCATION/TRAINING PROGRAM

## 2023-03-07 PROCEDURE — 1101F PR PT FALLS ASSESS DOC 0-1 FALLS W/OUT INJ PAST YR: ICD-10-PCS | Mod: CPTII,S$GLB,, | Performed by: STUDENT IN AN ORGANIZED HEALTH CARE EDUCATION/TRAINING PROGRAM

## 2023-03-07 PROCEDURE — 99213 PR OFFICE/OUTPT VISIT, EST, LEVL III, 20-29 MIN: ICD-10-PCS | Mod: S$GLB,,, | Performed by: STUDENT IN AN ORGANIZED HEALTH CARE EDUCATION/TRAINING PROGRAM

## 2023-03-07 PROCEDURE — 1125F AMNT PAIN NOTED PAIN PRSNT: CPT | Mod: CPTII,S$GLB,, | Performed by: STUDENT IN AN ORGANIZED HEALTH CARE EDUCATION/TRAINING PROGRAM

## 2023-03-07 PROCEDURE — 3008F PR BODY MASS INDEX (BMI) DOCUMENTED: ICD-10-PCS | Mod: CPTII,S$GLB,, | Performed by: STUDENT IN AN ORGANIZED HEALTH CARE EDUCATION/TRAINING PROGRAM

## 2023-03-07 PROCEDURE — 73110 X-RAY EXAM OF WRIST: CPT | Mod: TC,LT

## 2023-03-07 PROCEDURE — 1159F MED LIST DOCD IN RCRD: CPT | Mod: CPTII,S$GLB,, | Performed by: STUDENT IN AN ORGANIZED HEALTH CARE EDUCATION/TRAINING PROGRAM

## 2023-03-07 NOTE — PROGRESS NOTES
Orthopaedic Follow-Up Visit    Last Appointment: 23  Diagnosis: Left distal radius fracture, mildly displaced  Prior Procedure: Continue EXOS immobilization    Jaylyn May is a 67 y.o. female who is here for f/u evaluation of her left wrist. The patient was last seen here by me on 23 at which point we decided to proceed with non-operative management of her left distal radius fracture.  She was was to continue with her EXOS wrist brace. The patient returns today reporting that the symptoms have improved and is interested in discussing further treatment options.     To review her history, Jaylyn May is a 67 y.o. right-hand dominant female who presented with left wrist pain and dysfunction that began on 23 when she fell while roller skating and fell onto her left outstretched hand. XR revealed left distal radisus fracture that was mildly displaced. She elected to proceed with non-operative management so she was provided with an EXOS wrist brace.     Patient's medications, allergies, past medical, surgical, social and family histories were reviewed and updated as appropriate.    Review of Systems   All systems reviewed were negative.  Specifically, the patient denies fever, chills, weight loss, chest pain, shortness of breath, or dyspnea on exertion.      Past Medical History:   Diagnosis Date    Abnormal Pap smear     over 15 years     Pituitary cyst     Supraventricular tachycardia        Past Surgical History:   Procedure Laterality Date     SECTION      MANDIBLE FRACTURE SURGERY         Patient's Medications   New Prescriptions    No medications on file   Previous Medications    ASPIRIN (ECOTRIN) 81 MG EC TABLET    Take 81 mg by mouth once daily.    BIOTIN ORAL    Take 1 tablet by mouth once daily.     CETIRIZINE (ZYRTEC) 10 MG TABLET    Take 10 mg by mouth as needed.     DIPHENHYDRAMINE-ACETAMINOPHEN (TYLENOL PM)  MG TAB    Take 1 tablet by mouth nightly.    GARLIC EXTRACT ORAL     Take 1 capsule by mouth once daily.    IBANDRONATE (BONIVA) 150 MG TABLET    take one tablet by mouth every 30 days as directed    LORAZEPAM (ATIVAN) 1 MG TABLET    Take one tablet by mouth every 6 (six) hours as needed.    MELATONIN 10 MG TAB    Take 20 mg by mouth every evening.    MELOXICAM (MOBIC) 7.5 MG TABLET    Take 7.5 mg by mouth daily as needed for Pain.    METOPROLOL SUCCINATE (TOPROL-XL) 25 MG 24 HR TABLET    TAKE 1/2 TABLET ONCE DAILY    RIZATRIPTAN (MAXALT) 10 MG TABLET    Take 10 mg by mouth as needed for Migraine.    SIMVASTATIN (ZOCOR) 20 MG TABLET    Take 1 tablet by mouth once daily.    TRAMADOL (ULTRAM) 50 MG TABLET    Take 1 tablet (50 mg total) by mouth every 8 (eight) hours as needed for Pain.    VENLAFAXINE (EFFEXOR-XR) 150 MG CP24    Take 150 mg by mouth once daily.    VENLAFAXINE (EFFEXOR-XR) 75 MG 24 HR CAPSULE    Take 75 mg by mouth 2 (two) times daily.    Modified Medications    No medications on file   Discontinued Medications    No medications on file       Family History   Problem Relation Age of Onset    Hypertension Mother     Cataracts Mother     Blindness Maternal Aunt     Hypertension Maternal Aunt     Cataracts Maternal Aunt     Breast cancer Sister     Cancer Maternal Grandmother     Breast cancer Maternal Grandmother     Leukemia Paternal Aunt        Review of patient's allergies indicates:   Allergen Reactions    Penicillins      rash         Objective:      Physical Exam  Patient is alert and oriented, no distress. Skin is intact. Neuro is normal with no focal motor or sensory findings.      Left Wrist Exam:    Inspection: skin is intact, no erythema, no swelling or bruising.      Palpation: mild tenderness dorsal distal radius      Range of motion: tolerates range of motion of fingers, hand, wrist    Neurovascular examination  - Motor grossly intact bilaterally to shoulder abduction, elbow flexion and extension, wrist flexion and extension, and intrinsic hand  musculature  - Sensation intact to light touch bilaterally in axillary, median, radial, and ulnar distributions  - Symmetrical radial pulses    Imaging:    XR Results:  X-Ray Wrist Complete Left  Narrative: EXAM: XR WRIST COMPLETE 3 VIEWS LEFT    CLINICAL INDICATION:  Radial fracture    TECHNIQUE:5 views of the left wrist    PRIORS:  February 7, one month ago    FINDINGS: Significant layering through the distal radial fracture is present consistent with healing but the fracture line is still readily apparent.  Wrist otherwise unremarkable.  Impression:   Healing distal radial fracture as described    Finalized on: 3/7/2023 12:30 PM By:  Raul Burch MD  [facility]# [id number]      2023-03-07 12:32:[id number]    [facility]         MRI Results:  No results found for this or any previous visit.      CT Results:  No results found for this or any previous visit.      Physician read: I agree with the above impression.    Assessment/Plan:   Jaylyn May is a 67 y.o. female with left distal radius fracture, mild dorsal displacement    Plan:    Discussed diagnosis and treatment options with patient today.  She has a left distal radius fracture and x-rays show maintained alignment with interval healing.   She can start coming out of the brace at night and she can start to wean out of brace during the day as well.  Follow up 6 weeks with lori Lynn MD    I, Arabella Umanzor, acted as a scribe for Guy Lynn MD for the duration of this office visit.

## 2023-03-13 ENCOUNTER — PATIENT MESSAGE (OUTPATIENT)
Dept: OBSTETRICS AND GYNECOLOGY | Facility: CLINIC | Age: 68
End: 2023-03-13
Payer: MEDICARE

## 2023-03-13 ENCOUNTER — PATIENT MESSAGE (OUTPATIENT)
Dept: PAIN MEDICINE | Facility: CLINIC | Age: 68
End: 2023-03-13
Payer: MEDICARE

## 2023-03-13 DIAGNOSIS — Z12.31 ENCOUNTER FOR SCREENING MAMMOGRAM FOR BREAST CANCER: Primary | ICD-10-CM

## 2023-03-20 ENCOUNTER — HOSPITAL ENCOUNTER (OUTPATIENT)
Dept: RADIOLOGY | Facility: HOSPITAL | Age: 68
Discharge: HOME | End: 2023-03-20
Attending: NURSE PRACTITIONER
Payer: MEDICARE

## 2023-03-20 VITALS — BODY MASS INDEX: 24.94 KG/M2 | HEIGHT: 60 IN | WEIGHT: 127 LBS

## 2023-03-20 DIAGNOSIS — Z12.31 ENCOUNTER FOR SCREENING MAMMOGRAM FOR BREAST CANCER: ICD-10-CM

## 2023-03-20 PROCEDURE — 77063 BREAST TOMOSYNTHESIS BI: CPT | Mod: 26,,, | Performed by: RADIOLOGY

## 2023-03-20 PROCEDURE — 77067 MAMMO DIGITAL SCREENING BILAT WITH TOMO: ICD-10-PCS | Mod: 26,,, | Performed by: RADIOLOGY

## 2023-03-20 PROCEDURE — 77067 SCR MAMMO BI INCL CAD: CPT | Mod: 26,,, | Performed by: RADIOLOGY

## 2023-03-20 PROCEDURE — 77067 SCR MAMMO BI INCL CAD: CPT | Mod: TC

## 2023-03-20 PROCEDURE — 77063 MAMMO DIGITAL SCREENING BILAT WITH TOMO: ICD-10-PCS | Mod: 26,,, | Performed by: RADIOLOGY

## 2023-04-04 ENCOUNTER — OFFICE VISIT (OUTPATIENT)
Dept: OBSTETRICS AND GYNECOLOGY | Facility: CLINIC | Age: 68
End: 2023-04-04
Payer: MEDICARE

## 2023-04-04 VITALS
SYSTOLIC BLOOD PRESSURE: 124 MMHG | DIASTOLIC BLOOD PRESSURE: 66 MMHG | BODY MASS INDEX: 22.9 KG/M2 | WEIGHT: 116.63 LBS | HEIGHT: 60 IN

## 2023-04-04 DIAGNOSIS — Z71.89 GRIEF COUNSELING: Primary | ICD-10-CM

## 2023-04-04 PROCEDURE — 3074F PR MOST RECENT SYSTOLIC BLOOD PRESSURE < 130 MM HG: ICD-10-PCS | Mod: CPTII,S$GLB,, | Performed by: NURSE PRACTITIONER

## 2023-04-04 PROCEDURE — 3078F PR MOST RECENT DIASTOLIC BLOOD PRESSURE < 80 MM HG: ICD-10-PCS | Mod: CPTII,S$GLB,, | Performed by: NURSE PRACTITIONER

## 2023-04-04 PROCEDURE — 3078F DIAST BP <80 MM HG: CPT | Mod: CPTII,S$GLB,, | Performed by: NURSE PRACTITIONER

## 2023-04-04 PROCEDURE — 1126F AMNT PAIN NOTED NONE PRSNT: CPT | Mod: CPTII,S$GLB,, | Performed by: NURSE PRACTITIONER

## 2023-04-04 PROCEDURE — 99499 NO LOS: ICD-10-PCS | Mod: S$GLB,,, | Performed by: NURSE PRACTITIONER

## 2023-04-04 PROCEDURE — 99999 PR PBB SHADOW E&M-EST. PATIENT-LVL III: ICD-10-PCS | Mod: PBBFAC,,, | Performed by: NURSE PRACTITIONER

## 2023-04-04 PROCEDURE — 3074F SYST BP LT 130 MM HG: CPT | Mod: CPTII,S$GLB,, | Performed by: NURSE PRACTITIONER

## 2023-04-04 PROCEDURE — 99999 PR PBB SHADOW E&M-EST. PATIENT-LVL III: CPT | Mod: PBBFAC,,, | Performed by: NURSE PRACTITIONER

## 2023-04-04 PROCEDURE — 1101F PT FALLS ASSESS-DOCD LE1/YR: CPT | Mod: CPTII,S$GLB,, | Performed by: NURSE PRACTITIONER

## 2023-04-04 PROCEDURE — 3008F BODY MASS INDEX DOCD: CPT | Mod: CPTII,S$GLB,, | Performed by: NURSE PRACTITIONER

## 2023-04-04 PROCEDURE — 3008F PR BODY MASS INDEX (BMI) DOCUMENTED: ICD-10-PCS | Mod: CPTII,S$GLB,, | Performed by: NURSE PRACTITIONER

## 2023-04-04 PROCEDURE — 3288F FALL RISK ASSESSMENT DOCD: CPT | Mod: CPTII,S$GLB,, | Performed by: NURSE PRACTITIONER

## 2023-04-04 PROCEDURE — 1101F PR PT FALLS ASSESS DOC 0-1 FALLS W/OUT INJ PAST YR: ICD-10-PCS | Mod: CPTII,S$GLB,, | Performed by: NURSE PRACTITIONER

## 2023-04-04 PROCEDURE — 3288F PR FALLS RISK ASSESSMENT DOCUMENTED: ICD-10-PCS | Mod: CPTII,S$GLB,, | Performed by: NURSE PRACTITIONER

## 2023-04-04 PROCEDURE — 1126F PR PAIN SEVERITY QUANTIFIED, NO PAIN PRESENT: ICD-10-PCS | Mod: CPTII,S$GLB,, | Performed by: NURSE PRACTITIONER

## 2023-04-04 PROCEDURE — 99499 UNLISTED E&M SERVICE: CPT | Mod: S$GLB,,, | Performed by: NURSE PRACTITIONER

## 2023-04-04 NOTE — PROGRESS NOTES
Pt not due for annual exam for one year.  Pt wanted to discuss losing her  in December. Wants to date.

## 2023-04-18 ENCOUNTER — PATIENT MESSAGE (OUTPATIENT)
Dept: OBSTETRICS AND GYNECOLOGY | Facility: CLINIC | Age: 68
End: 2023-04-18

## 2023-04-21 ENCOUNTER — PATIENT MESSAGE (OUTPATIENT)
Dept: ORTHOPEDICS | Facility: CLINIC | Age: 68
End: 2023-04-21
Payer: MEDICARE

## 2023-04-24 ENCOUNTER — PATIENT MESSAGE (OUTPATIENT)
Dept: ORTHOPEDICS | Facility: CLINIC | Age: 68
End: 2023-04-24
Payer: MEDICARE

## 2023-05-06 NOTE — PROGRESS NOTES
Orthopaedic Follow-Up Visit    Last Appointment: 3/7/23  Diagnosis: Left distal radius fracture, mildly displaced  Prior Procedure: Begin weaning out of     Jaylyn May is a 67 y.o. female who is here for f/u evaluation of her left wrist. The patient was last seen here by me on 3/7/23 at which point we decided to proceed with non-operative management of her left distal radius fracture.  She was to begin weaning out of her brace. The patient returns today reporting that the symptoms have improved overall but still has some mild pain in her hand and is interested in discussing further treatment options.     To review her history, Jaylyn May is a 67 y.o. right-hand dominant female who presented with left wrist pain and dysfunction that began on 23 when she fell while roller skating and fell onto her left outstretched hand. XR revealed left distal radisus fracture that was mildly displaced. She elected to proceed with non-operative management. At prior visit she was to begin weaning out of her wrist brace.     Patient's medications, allergies, past medical, surgical, social and family histories were reviewed and updated as appropriate.    Review of Systems   All systems reviewed were negative.  Specifically, the patient denies fever, chills, weight loss, chest pain, shortness of breath, or dyspnea on exertion.      Past Medical History:   Diagnosis Date    Abnormal Pap smear     over 15 years     Pituitary cyst     Supraventricular tachycardia        Past Surgical History:   Procedure Laterality Date     SECTION      MANDIBLE FRACTURE SURGERY         Patient's Medications   New Prescriptions    No medications on file   Previous Medications    ASPIRIN (ECOTRIN) 81 MG EC TABLET    Take 81 mg by mouth once daily.    BIOTIN ORAL    Take 1 tablet by mouth once daily.     DIPHENHYDRAMINE-ACETAMINOPHEN (TYLENOL PM)  MG TAB    Take 1 tablet by mouth nightly.    GARLIC EXTRACT ORAL    Take 1 capsule  by mouth once daily.    IBANDRONATE (BONIVA) 150 MG TABLET    take one tablet by mouth every 30 days as directed    LORAZEPAM (ATIVAN) 1 MG TABLET    Take one tablet by mouth every 6 (six) hours as needed.    MELOXICAM (MOBIC) 7.5 MG TABLET    Take 7.5 mg by mouth daily as needed for Pain.    METOPROLOL SUCCINATE (TOPROL-XL) 25 MG 24 HR TABLET    TAKE 1/2 TABLET ONCE DAILY    SIMVASTATIN (ZOCOR) 20 MG TABLET    Take 1 tablet by mouth once daily.    TRAMADOL (ULTRAM) 50 MG TABLET    Take 1 tablet (50 mg total) by mouth every 8 (eight) hours as needed for Pain.    VENLAFAXINE (EFFEXOR-XR) 150 MG CP24    Take 150 mg by mouth once daily.   Modified Medications    No medications on file   Discontinued Medications    No medications on file       Family History   Problem Relation Age of Onset    Hypertension Mother     Cataracts Mother     Blindness Maternal Aunt     Hypertension Maternal Aunt     Cataracts Maternal Aunt     Breast cancer Sister     Cancer Maternal Grandmother     Breast cancer Maternal Grandmother     Leukemia Paternal Aunt        Review of patient's allergies indicates:   Allergen Reactions    Penicillins      rash         Objective:      Physical Exam  Patient is alert and oriented, no distress. Skin is intact. Neuro is normal with no focal motor or sensory findings.      Left Wrist Exam:    Inspection: skin is intact, no erythema, no swelling or bruising.      Palpation: mild tenderness dorsal distal radius      Range of motion: tolerates range of motion of fingers, hand, wrist        Some limitation with wrist extension     Neurovascular examination  - Motor grossly intact bilaterally to shoulder abduction, elbow flexion and extension, wrist flexion and extension, and intrinsic hand musculature  - Sensation intact to light touch bilaterally in axillary, median, radial, and ulnar distributions  - Symmetrical radial pulses    Imaging:    XR Results:  X-Ray Wrist Complete 3 views Left  Narrative:  EXAMINATION:  XR WRIST COMPLETE 3 VIEWS LEFT    CLINICAL HISTORY:  Other fractures of lower end of left radius, subsequent encounter for closed fracture with routine healing    TECHNIQUE:  PA, lateral, and oblique views of the left wrist were performed.    COMPARISON:  03/07/2023    FINDINGS:  There is a healing impacted fracture of the distal radius.  Fracture fragment alignment is stable.  Remaining findings unchanged.  Impression: As above    Electronically signed by: Arnulfo Berman DO  Date:    05/10/2023  Time:    11:10            MRI Results:  No results found for this or any previous visit.      CT Results:  No results found for this or any previous visit.      Physician read: I agree with the above impression.    Assessment/Plan:   Jaylyn May is a 67 y.o. female with left distal radius fracture, routine healing    Plan:    She has a left distal radius fracture and x-rays show maintained alignment with continued healing. She has full function during ADL's and only minimal soreness with activity that is improving.  Overall she is doing well and I advised to continue progressing with exercises at home to regain full range of motion and strength.   Follow up with me as needed.           Guy Lynn MD    I, Kyle León, acted as a scribe for Guy Lynn MD for the duration of this office visit.

## 2023-05-10 ENCOUNTER — HOSPITAL ENCOUNTER (OUTPATIENT)
Dept: RADIOLOGY | Facility: HOSPITAL | Age: 68
Discharge: HOME | End: 2023-05-10
Attending: STUDENT IN AN ORGANIZED HEALTH CARE EDUCATION/TRAINING PROGRAM
Payer: MEDICARE

## 2023-05-10 ENCOUNTER — OFFICE VISIT (OUTPATIENT)
Dept: SPORTS MEDICINE | Facility: CLINIC | Age: 68
End: 2023-05-10
Payer: MEDICARE

## 2023-05-10 VITALS — HEIGHT: 60 IN | BODY MASS INDEX: 22.78 KG/M2 | WEIGHT: 116 LBS

## 2023-05-10 DIAGNOSIS — S52.592D OTHER CLOSED FRACTURE OF DISTAL END OF LEFT RADIUS WITH ROUTINE HEALING, SUBSEQUENT ENCOUNTER: ICD-10-CM

## 2023-05-10 DIAGNOSIS — S52.532D CLOSED COLLES' FRACTURE OF LEFT RADIUS WITH ROUTINE HEALING, SUBSEQUENT ENCOUNTER: Primary | ICD-10-CM

## 2023-05-10 PROCEDURE — 1160F PR REVIEW ALL MEDS BY PRESCRIBER/CLIN PHARMACIST DOCUMENTED: ICD-10-PCS | Mod: CPTII,S$GLB,, | Performed by: STUDENT IN AN ORGANIZED HEALTH CARE EDUCATION/TRAINING PROGRAM

## 2023-05-10 PROCEDURE — 1125F AMNT PAIN NOTED PAIN PRSNT: CPT | Mod: CPTII,S$GLB,, | Performed by: STUDENT IN AN ORGANIZED HEALTH CARE EDUCATION/TRAINING PROGRAM

## 2023-05-10 PROCEDURE — 99999 PR PBB SHADOW E&M-EST. PATIENT-LVL III: ICD-10-PCS | Mod: PBBFAC,,, | Performed by: STUDENT IN AN ORGANIZED HEALTH CARE EDUCATION/TRAINING PROGRAM

## 2023-05-10 PROCEDURE — 1101F PR PT FALLS ASSESS DOC 0-1 FALLS W/OUT INJ PAST YR: ICD-10-PCS | Mod: CPTII,S$GLB,, | Performed by: STUDENT IN AN ORGANIZED HEALTH CARE EDUCATION/TRAINING PROGRAM

## 2023-05-10 PROCEDURE — 3008F PR BODY MASS INDEX (BMI) DOCUMENTED: ICD-10-PCS | Mod: CPTII,S$GLB,, | Performed by: STUDENT IN AN ORGANIZED HEALTH CARE EDUCATION/TRAINING PROGRAM

## 2023-05-10 PROCEDURE — 1101F PT FALLS ASSESS-DOCD LE1/YR: CPT | Mod: CPTII,S$GLB,, | Performed by: STUDENT IN AN ORGANIZED HEALTH CARE EDUCATION/TRAINING PROGRAM

## 2023-05-10 PROCEDURE — 3288F PR FALLS RISK ASSESSMENT DOCUMENTED: ICD-10-PCS | Mod: CPTII,S$GLB,, | Performed by: STUDENT IN AN ORGANIZED HEALTH CARE EDUCATION/TRAINING PROGRAM

## 2023-05-10 PROCEDURE — 1159F MED LIST DOCD IN RCRD: CPT | Mod: CPTII,S$GLB,, | Performed by: STUDENT IN AN ORGANIZED HEALTH CARE EDUCATION/TRAINING PROGRAM

## 2023-05-10 PROCEDURE — 3288F FALL RISK ASSESSMENT DOCD: CPT | Mod: CPTII,S$GLB,, | Performed by: STUDENT IN AN ORGANIZED HEALTH CARE EDUCATION/TRAINING PROGRAM

## 2023-05-10 PROCEDURE — 1159F PR MEDICATION LIST DOCUMENTED IN MEDICAL RECORD: ICD-10-PCS | Mod: CPTII,S$GLB,, | Performed by: STUDENT IN AN ORGANIZED HEALTH CARE EDUCATION/TRAINING PROGRAM

## 2023-05-10 PROCEDURE — 1160F RVW MEDS BY RX/DR IN RCRD: CPT | Mod: CPTII,S$GLB,, | Performed by: STUDENT IN AN ORGANIZED HEALTH CARE EDUCATION/TRAINING PROGRAM

## 2023-05-10 PROCEDURE — 73110 X-RAY EXAM OF WRIST: CPT | Mod: TC,LT

## 2023-05-10 PROCEDURE — 99999 PR PBB SHADOW E&M-EST. PATIENT-LVL III: CPT | Mod: PBBFAC,,, | Performed by: STUDENT IN AN ORGANIZED HEALTH CARE EDUCATION/TRAINING PROGRAM

## 2023-05-10 PROCEDURE — 73110 XR WRIST COMPLETE 3 VIEWS LEFT: ICD-10-PCS | Mod: 26,LT,, | Performed by: RADIOLOGY

## 2023-05-10 PROCEDURE — 73110 X-RAY EXAM OF WRIST: CPT | Mod: 26,LT,, | Performed by: RADIOLOGY

## 2023-05-10 PROCEDURE — 3008F BODY MASS INDEX DOCD: CPT | Mod: CPTII,S$GLB,, | Performed by: STUDENT IN AN ORGANIZED HEALTH CARE EDUCATION/TRAINING PROGRAM

## 2023-05-10 PROCEDURE — 1125F PR PAIN SEVERITY QUANTIFIED, PAIN PRESENT: ICD-10-PCS | Mod: CPTII,S$GLB,, | Performed by: STUDENT IN AN ORGANIZED HEALTH CARE EDUCATION/TRAINING PROGRAM

## 2023-05-10 PROCEDURE — 99213 PR OFFICE/OUTPT VISIT, EST, LEVL III, 20-29 MIN: ICD-10-PCS | Mod: S$GLB,,, | Performed by: STUDENT IN AN ORGANIZED HEALTH CARE EDUCATION/TRAINING PROGRAM

## 2023-05-10 PROCEDURE — 99213 OFFICE O/P EST LOW 20 MIN: CPT | Mod: S$GLB,,, | Performed by: STUDENT IN AN ORGANIZED HEALTH CARE EDUCATION/TRAINING PROGRAM

## 2023-05-25 ENCOUNTER — HOSPITAL ENCOUNTER (OUTPATIENT)
Dept: CARDIOLOGY | Facility: HOSPITAL | Age: 68
Discharge: HOME | End: 2023-05-25
Attending: STUDENT IN AN ORGANIZED HEALTH CARE EDUCATION/TRAINING PROGRAM
Payer: MEDICARE

## 2023-05-25 ENCOUNTER — OFFICE VISIT (OUTPATIENT)
Dept: CARDIOLOGY | Facility: CLINIC | Age: 68
End: 2023-05-25
Payer: MEDICARE

## 2023-05-25 VITALS
DIASTOLIC BLOOD PRESSURE: 58 MMHG | BODY MASS INDEX: 22.78 KG/M2 | HEART RATE: 65 BPM | HEIGHT: 60 IN | WEIGHT: 116.63 LBS | SYSTOLIC BLOOD PRESSURE: 114 MMHG | WEIGHT: 116.63 LBS | OXYGEN SATURATION: 99 % | BODY MASS INDEX: 22.9 KG/M2

## 2023-05-25 DIAGNOSIS — G43.809 OTHER MIGRAINE WITHOUT STATUS MIGRAINOSUS, NOT INTRACTABLE: ICD-10-CM

## 2023-05-25 DIAGNOSIS — E78.00 PURE HYPERCHOLESTEROLEMIA: ICD-10-CM

## 2023-05-25 DIAGNOSIS — F41.1 ANXIETY STATE: ICD-10-CM

## 2023-05-25 DIAGNOSIS — I47.10 PAROXYSMAL SUPRAVENTRICULAR TACHYCARDIA: Primary | ICD-10-CM

## 2023-05-25 DIAGNOSIS — I47.10 PAROXYSMAL SUPRAVENTRICULAR TACHYCARDIA: ICD-10-CM

## 2023-05-25 PROCEDURE — 99999 PR PBB SHADOW E&M-EST. PATIENT-LVL III: ICD-10-PCS | Mod: PBBFAC,,, | Performed by: STUDENT IN AN ORGANIZED HEALTH CARE EDUCATION/TRAINING PROGRAM

## 2023-05-25 PROCEDURE — 1159F MED LIST DOCD IN RCRD: CPT | Mod: CPTII,S$GLB,, | Performed by: STUDENT IN AN ORGANIZED HEALTH CARE EDUCATION/TRAINING PROGRAM

## 2023-05-25 PROCEDURE — 93010 EKG 12-LEAD: ICD-10-PCS | Mod: ,,, | Performed by: INTERNAL MEDICINE

## 2023-05-25 PROCEDURE — 3008F PR BODY MASS INDEX (BMI) DOCUMENTED: ICD-10-PCS | Mod: CPTII,S$GLB,, | Performed by: STUDENT IN AN ORGANIZED HEALTH CARE EDUCATION/TRAINING PROGRAM

## 2023-05-25 PROCEDURE — 3074F PR MOST RECENT SYSTOLIC BLOOD PRESSURE < 130 MM HG: ICD-10-PCS | Mod: CPTII,S$GLB,, | Performed by: STUDENT IN AN ORGANIZED HEALTH CARE EDUCATION/TRAINING PROGRAM

## 2023-05-25 PROCEDURE — 3078F PR MOST RECENT DIASTOLIC BLOOD PRESSURE < 80 MM HG: ICD-10-PCS | Mod: CPTII,S$GLB,, | Performed by: STUDENT IN AN ORGANIZED HEALTH CARE EDUCATION/TRAINING PROGRAM

## 2023-05-25 PROCEDURE — 3008F BODY MASS INDEX DOCD: CPT | Mod: CPTII,S$GLB,, | Performed by: STUDENT IN AN ORGANIZED HEALTH CARE EDUCATION/TRAINING PROGRAM

## 2023-05-25 PROCEDURE — 1101F PT FALLS ASSESS-DOCD LE1/YR: CPT | Mod: CPTII,S$GLB,, | Performed by: STUDENT IN AN ORGANIZED HEALTH CARE EDUCATION/TRAINING PROGRAM

## 2023-05-25 PROCEDURE — 3078F DIAST BP <80 MM HG: CPT | Mod: CPTII,S$GLB,, | Performed by: STUDENT IN AN ORGANIZED HEALTH CARE EDUCATION/TRAINING PROGRAM

## 2023-05-25 PROCEDURE — 3288F FALL RISK ASSESSMENT DOCD: CPT | Mod: CPTII,S$GLB,, | Performed by: STUDENT IN AN ORGANIZED HEALTH CARE EDUCATION/TRAINING PROGRAM

## 2023-05-25 PROCEDURE — 1126F PR PAIN SEVERITY QUANTIFIED, NO PAIN PRESENT: ICD-10-PCS | Mod: CPTII,S$GLB,, | Performed by: STUDENT IN AN ORGANIZED HEALTH CARE EDUCATION/TRAINING PROGRAM

## 2023-05-25 PROCEDURE — 99999 PR PBB SHADOW E&M-EST. PATIENT-LVL III: CPT | Mod: PBBFAC,,, | Performed by: STUDENT IN AN ORGANIZED HEALTH CARE EDUCATION/TRAINING PROGRAM

## 2023-05-25 PROCEDURE — 3288F PR FALLS RISK ASSESSMENT DOCUMENTED: ICD-10-PCS | Mod: CPTII,S$GLB,, | Performed by: STUDENT IN AN ORGANIZED HEALTH CARE EDUCATION/TRAINING PROGRAM

## 2023-05-25 PROCEDURE — 99214 PR OFFICE/OUTPT VISIT, EST, LEVL IV, 30-39 MIN: ICD-10-PCS | Mod: S$GLB,,, | Performed by: STUDENT IN AN ORGANIZED HEALTH CARE EDUCATION/TRAINING PROGRAM

## 2023-05-25 PROCEDURE — 1101F PR PT FALLS ASSESS DOC 0-1 FALLS W/OUT INJ PAST YR: ICD-10-PCS | Mod: CPTII,S$GLB,, | Performed by: STUDENT IN AN ORGANIZED HEALTH CARE EDUCATION/TRAINING PROGRAM

## 2023-05-25 PROCEDURE — 3074F SYST BP LT 130 MM HG: CPT | Mod: CPTII,S$GLB,, | Performed by: STUDENT IN AN ORGANIZED HEALTH CARE EDUCATION/TRAINING PROGRAM

## 2023-05-25 PROCEDURE — 93010 ELECTROCARDIOGRAM REPORT: CPT | Mod: ,,, | Performed by: INTERNAL MEDICINE

## 2023-05-25 PROCEDURE — 93005 ELECTROCARDIOGRAM TRACING: CPT

## 2023-05-25 PROCEDURE — 1126F AMNT PAIN NOTED NONE PRSNT: CPT | Mod: CPTII,S$GLB,, | Performed by: STUDENT IN AN ORGANIZED HEALTH CARE EDUCATION/TRAINING PROGRAM

## 2023-05-25 PROCEDURE — 99214 OFFICE O/P EST MOD 30 MIN: CPT | Mod: S$GLB,,, | Performed by: STUDENT IN AN ORGANIZED HEALTH CARE EDUCATION/TRAINING PROGRAM

## 2023-05-25 PROCEDURE — 1159F PR MEDICATION LIST DOCUMENTED IN MEDICAL RECORD: ICD-10-PCS | Mod: CPTII,S$GLB,, | Performed by: STUDENT IN AN ORGANIZED HEALTH CARE EDUCATION/TRAINING PROGRAM

## 2023-05-25 NOTE — PROGRESS NOTES
Subjective:   Patient ID:  Jaylyn May is a 67 y.o. female who presents for follow up of Palpitations (Fluttering this AM)      HPI  A 64 YO FEMALE WITH PSVT MIGRAINE USED TO FOLLOW WITH dr swift she is here for yearly follow up she is fairly active needs more regimented aerobic exercise has very rare palpitation last few minutes at worse. Has no chest pain no shortness of breath compliant with b blockers no syncope .        22  Sees Dr. Garza in cardiology clinic. Would like a female physician   Does not exercise regularly. Used to exercises regularly  Tries to be active at home   Weight has been stable  Avoids certain foods   Retired nurse   was diagnosed with lymphoma years ago, doing well now   denies tobacco abuse. ETOH 1-2 glasses wine nightly     Denies any symptoms. No palpitatons         23  Lipid panel stable     last December due to sickness  Does not feel good this morning  Palpations are intermittent, very rare  Sometimes gets chest pain, SOB   Thinks due to stress at times   Lost 11 lbs since   Appetite still poor       EKG 23 NSR  EKG 22 NSR, nonspecific ST abn, qtc 412 ms     Echo   Normal systolic function.  The estimated ejection fraction is 60%.  Normal left ventricular diastolic function.  Normal right ventricular size with normal right ventricular systolic function.  Mild aortic regurgitation.  Mild mitral regurgitation.  Mild tricuspid regurgitation.  Normal central venous pressure (3 mmHg).  The estimated PA systolic pressure is 33 mmHg.        Past Medical History:   Diagnosis Date    Abnormal Pap smear     over 15 years     COVID-19     End of April    Pituitary cyst     Supraventricular tachycardia        Past Surgical History:   Procedure Laterality Date     SECTION      MANDIBLE FRACTURE SURGERY         Social History     Tobacco Use    Smoking status: Never    Smokeless tobacco: Never   Substance Use Topics    Alcohol use:  Yes     Alcohol/week: 8.0 standard drinks     Types: 8 Glasses of wine per week     Comment: socally     Drug use: No       Family History   Problem Relation Age of Onset    Hypertension Mother     Cataracts Mother     Blindness Maternal Aunt     Hypertension Maternal Aunt     Cataracts Maternal Aunt     Breast cancer Sister     Cancer Maternal Grandmother     Breast cancer Maternal Grandmother     Leukemia Paternal Aunt        Current Outpatient Medications   Medication Sig    aspirin (ECOTRIN) 81 MG EC tablet Take 81 mg by mouth once daily.    diphenhydrAMINE-acetaminophen (TYLENOL PM)  mg Tab Take 1 tablet by mouth nightly.    GARLIC EXTRACT ORAL Take 1 capsule by mouth once daily.    ibandronate (BONIVA) 150 mg tablet take one tablet by mouth every 30 days as directed    LORazepam (ATIVAN) 1 MG tablet Take one tablet by mouth every 6 (six) hours as needed. (Patient taking differently: Take 1 mg by mouth every 6 (six) hours as needed.)    metoprolol succinate (TOPROL-XL) 25 MG 24 hr tablet TAKE 1/2 TABLET ONCE DAILY    simvastatin (ZOCOR) 20 MG tablet Take 1 tablet by mouth once daily.    venlafaxine (EFFEXOR-XR) 150 MG Cp24 Take 150 mg by mouth once daily.    meloxicam (MOBIC) 7.5 MG tablet Take 7.5 mg by mouth daily as needed for Pain.    traMADoL (ULTRAM) 50 mg tablet Take 1 tablet (50 mg total) by mouth every 8 (eight) hours as needed for Pain. (Patient not taking: Reported on 5/25/2023)     No current facility-administered medications for this visit.     Current Outpatient Medications on File Prior to Visit   Medication Sig    aspirin (ECOTRIN) 81 MG EC tablet Take 81 mg by mouth once daily.    diphenhydrAMINE-acetaminophen (TYLENOL PM)  mg Tab Take 1 tablet by mouth nightly.    GARLIC EXTRACT ORAL Take 1 capsule by mouth once daily.    ibandronate (BONIVA) 150 mg tablet take one tablet by mouth every 30 days as directed    LORazepam (ATIVAN) 1 MG tablet Take one tablet by mouth every 6 (six)  hours as needed. (Patient taking differently: Take 1 mg by mouth every 6 (six) hours as needed.)    metoprolol succinate (TOPROL-XL) 25 MG 24 hr tablet TAKE 1/2 TABLET ONCE DAILY    simvastatin (ZOCOR) 20 MG tablet Take 1 tablet by mouth once daily.    venlafaxine (EFFEXOR-XR) 150 MG Cp24 Take 150 mg by mouth once daily.    meloxicam (MOBIC) 7.5 MG tablet Take 7.5 mg by mouth daily as needed for Pain.    traMADoL (ULTRAM) 50 mg tablet Take 1 tablet (50 mg total) by mouth every 8 (eight) hours as needed for Pain. (Patient not taking: Reported on 5/25/2023)    [DISCONTINUED] BIOTIN ORAL Take 1 tablet by mouth once daily.      No current facility-administered medications on file prior to visit.     Review of patient's allergies indicates:   Allergen Reactions    Penicillins      rash     Review of Systems   Constitutional: Negative for diaphoresis, malaise/fatigue and weight gain.   HENT:  Negative for hoarse voice.    Eyes:  Negative for double vision and visual disturbance.   Cardiovascular:  Negative for chest pain, claudication, cyanosis, dyspnea on exertion, irregular heartbeat, leg swelling, near-syncope, orthopnea, paroxysmal nocturnal dyspnea and syncope.   Respiratory:  Negative for cough, hemoptysis, shortness of breath and snoring.    Hematologic/Lymphatic: Negative for bleeding problem. Does not bruise/bleed easily.   Skin:  Negative for color change and poor wound healing.   Musculoskeletal:  Negative for muscle cramps, muscle weakness and myalgias.   Gastrointestinal:  Negative for bloating, abdominal pain, change in bowel habit, diarrhea, heartburn, hematemesis, hematochezia, melena and nausea.   Neurological:  Negative for excessive daytime sleepiness, dizziness, headaches, light-headedness, loss of balance, numbness and weakness.   Psychiatric/Behavioral:  Negative for memory loss. The patient does not have insomnia.    Allergic/Immunologic: Negative for hives.           Objective:     Physical exam      The pt is alert and oriented X 3  HEENT: No Icterus/Pale Conjunctiva/LAD  CVS     : No abnormal cardiac pulsations noted on inspection. JVP not raised.                 The apical impulse is normal on palpation, and is located in the left 5th                intercostal space in the mid - clavicular line. No palpable thrills or abnormal                pulsations noted. RR, S1 - S2 heard, no murmurs, rubs or gallops appreciated.   PUL     : CTA B/L. No wheezes/crackles heard   ABD     : BS +, soft. No tenderness elicited   LE        : No C/C/E. Distal Pulses palpable B/L         Vitals:    05/25/23 1028   BP: (!) 114/58   BP Location: Left arm   Patient Position: Sitting   BP Method: Medium (Manual)   Pulse: 65   SpO2: 99%   Weight: 52.9 kg (116 lb 10 oz)   Height: 5' (1.524 m)     Lab Results   Component Value Date    CHOL 191 06/03/2022    CHOL 192 02/27/2020    CHOL 225 (H) 09/08/2011     Lab Results   Component Value Date    HDL 60 06/03/2022    HDL 60 02/27/2020    HDL 64 09/08/2011     Lab Results   Component Value Date    LDLCALC 112.4 06/03/2022    LDLCALC 113.6 02/27/2020    LDLCALC 149.6 09/08/2011     Lab Results   Component Value Date    TRIG 93 06/03/2022    TRIG 92 02/27/2020    TRIG 57 09/08/2011     Lab Results   Component Value Date    CHOLHDL 31.4 06/03/2022    CHOLHDL 31.3 02/27/2020    CHOLHDL 28.4 09/08/2011       Chemistry        Component Value Date/Time     08/08/2019 1417    K 4.3 08/08/2019 1417     08/08/2019 1417    CO2 27 08/08/2019 1417    BUN 11 08/08/2019 1417    CREATININE 0.7 08/08/2019 1417     08/08/2019 1417        Component Value Date/Time    CALCIUM 9.8 08/08/2019 1417    ALKPHOS 69 09/08/2011 0848    AST 22 09/08/2011 0848    ALT 23 09/08/2011 0848    BILITOT 0.5 09/08/2011 0848    ESTGFRAFRICA >60.0 08/08/2019 1417    EGFRNONAA >60.0 08/08/2019 1417          Lab Results   Component Value Date    TSH 1.613 08/08/2019     No results found for: INR,  PROTIME  Lab Results   Component Value Date    WBC 7.31 2020    HGB 12.9 2020    HCT 41.8 2020    MCV 95 2020     2020     BMP  Sodium   Date Value Ref Range Status   2019 141 136 - 145 mmol/L Final     Potassium   Date Value Ref Range Status   2019 4.3 3.5 - 5.1 mmol/L Final     Chloride   Date Value Ref Range Status   2019 104 95 - 110 mmol/L Final     CO2   Date Value Ref Range Status   2019 27 23 - 29 mmol/L Final     BUN   Date Value Ref Range Status   2019 11 8 - 23 mg/dL Final     Creatinine   Date Value Ref Range Status   2019 0.7 0.5 - 1.4 mg/dL Final     Calcium   Date Value Ref Range Status   2019 9.8 8.7 - 10.5 mg/dL Final     Anion Gap   Date Value Ref Range Status   2019 10 8 - 16 mmol/L Final     eGFR if    Date Value Ref Range Status   2019 >60.0 >60 mL/min/1.73 m^2 Final     eGFR if non    Date Value Ref Range Status   2019 >60.0 >60 mL/min/1.73 m^2 Final     Comment:     Calculation used to obtain the estimated glomerular filtration  rate (eGFR) is the CKD-EPI equation.        CrCl cannot be calculated (Patient's most recent lab result is older than the maximum 7 days allowed.).    Assessment:     1. Paroxysmal supraventricular tachycardia    2. Other migraine without status migrainosus, not intractable    3. Anxiety state    4. Pure hypercholesterolemia      Does not get palpitations at night while sleeping  Palpitations are intermittent  Has encounter more stress since    Has poor appetite       Plan:   Cholesterol stable   Continue current therapy  Cardiac low salt diet.  Risk factor modification and excercise program.  F/u yearly, earlier if an issue.     All labs and studies reviewed.     Isabelle Roque MD  Cardiology Staff

## 2023-08-02 ENCOUNTER — PATIENT MESSAGE (OUTPATIENT)
Dept: DERMATOLOGY | Facility: CLINIC | Age: 68
End: 2023-08-02
Payer: MEDICARE

## 2023-08-16 ENCOUNTER — PATIENT MESSAGE (OUTPATIENT)
Dept: CARDIOLOGY | Facility: CLINIC | Age: 68
End: 2023-08-16
Payer: MEDICARE

## 2023-08-24 ENCOUNTER — OFFICE VISIT (OUTPATIENT)
Dept: DERMATOLOGY | Facility: CLINIC | Age: 68
End: 2023-08-24
Payer: MEDICARE

## 2023-08-24 DIAGNOSIS — D69.2 SOLAR PURPURA: Primary | ICD-10-CM

## 2023-08-24 DIAGNOSIS — L72.0 MILIA: ICD-10-CM

## 2023-08-24 PROCEDURE — 1160F PR REVIEW ALL MEDS BY PRESCRIBER/CLIN PHARMACIST DOCUMENTED: ICD-10-PCS | Mod: CPTII,S$GLB,, | Performed by: STUDENT IN AN ORGANIZED HEALTH CARE EDUCATION/TRAINING PROGRAM

## 2023-08-24 PROCEDURE — 99999 PR PBB SHADOW E&M-EST. PATIENT-LVL II: ICD-10-PCS | Mod: PBBFAC,,, | Performed by: STUDENT IN AN ORGANIZED HEALTH CARE EDUCATION/TRAINING PROGRAM

## 2023-08-24 PROCEDURE — 1160F RVW MEDS BY RX/DR IN RCRD: CPT | Mod: CPTII,S$GLB,, | Performed by: STUDENT IN AN ORGANIZED HEALTH CARE EDUCATION/TRAINING PROGRAM

## 2023-08-24 PROCEDURE — 1159F PR MEDICATION LIST DOCUMENTED IN MEDICAL RECORD: ICD-10-PCS | Mod: CPTII,S$GLB,, | Performed by: STUDENT IN AN ORGANIZED HEALTH CARE EDUCATION/TRAINING PROGRAM

## 2023-08-24 PROCEDURE — 99213 OFFICE O/P EST LOW 20 MIN: CPT | Mod: S$GLB,,, | Performed by: STUDENT IN AN ORGANIZED HEALTH CARE EDUCATION/TRAINING PROGRAM

## 2023-08-24 PROCEDURE — 1159F MED LIST DOCD IN RCRD: CPT | Mod: CPTII,S$GLB,, | Performed by: STUDENT IN AN ORGANIZED HEALTH CARE EDUCATION/TRAINING PROGRAM

## 2023-08-24 PROCEDURE — 99213 PR OFFICE/OUTPT VISIT, EST, LEVL III, 20-29 MIN: ICD-10-PCS | Mod: S$GLB,,, | Performed by: STUDENT IN AN ORGANIZED HEALTH CARE EDUCATION/TRAINING PROGRAM

## 2023-08-24 PROCEDURE — 99999 PR PBB SHADOW E&M-EST. PATIENT-LVL II: CPT | Mod: PBBFAC,,, | Performed by: STUDENT IN AN ORGANIZED HEALTH CARE EDUCATION/TRAINING PROGRAM

## 2023-08-24 NOTE — PROGRESS NOTES
Patient Information  Name: Jaylyn May  : 1955  MRN: [id number]     Referring Physician:  Dr. Argueta ref. provider found   Primary Care Physician:  Humberto Chu MD   Date of Visit: 2023      Subjective:       Jaylyn May is a 67 y.o. female who presents for   Chief Complaint   Patient presents with    skin lesion      Lesion on face. X 6 weeks. Tx none.      HPI  Patient with new complaint of lesion(s)  Location: left cheek  Duration: 6 weeks  Symptoms: none  Relieving factors/Previous treatments: none    Patient with new complaint of lesion(s)  Location: arms  Duration: months  Symptoms: bruising  Relieving factors/Previous treatments: none      Patient was last seen:Visit date not found     Prior notes by myself reviewed.   Clinical documentation obtained by nursing staff reviewed.    Review of Systems   Skin:  Negative for itching and rash.        Objective:    Physical Exam   Constitutional: She appears well-developed and well-nourished. No distress.   Neurological: She is alert and oriented to person, place, and time. She is not disoriented.   Psychiatric: She has a normal mood and affect.   Skin:   Areas Examined (abnormalities noted in diagram):   RUE Inspected  LUE Inspection Performed                   Diagram Legend     Erythematous scaling macule/papule c/w actinic keratosis       Vascular papule c/w angioma      Pigmented verrucoid papule/plaque c/w seborrheic keratosis      Yellow umbilicated papule c/w sebaceous hyperplasia      Irregularly shaped tan macule c/w lentigo     1-2 mm smooth white papules consistent with Milia      Movable subcutaneous cyst with punctum c/w epidermal inclusion cyst      Subcutaneous movable cyst c/w pilar cyst      Firm pink to brown papule c/w dermatofibroma      Pedunculated fleshy papule(s) c/w skin tag(s)      Evenly pigmented macule c/w junctional nevus     Mildly variegated pigmented, slightly irregular-bordered macule c/w mildly  atypical nevus      Flesh colored to evenly pigmented papule c/w intradermal nevus       Pink pearly papule/plaque c/w basal cell carcinoma      Erythematous hyperkeratotic cursted plaque c/w SCC      Surgical scar with no sign of skin cancer recurrence      Open and closed comedones      Inflammatory papules and pustules      Verrucoid papule consistent consistent with wart     Erythematous eczematous patches and plaques     Dystrophic onycholytic nail with subungual debris c/w onychomycosis     Umbilicated papule    Erythematous-base heme-crusted tan verrucoid plaque consistent with inflamed seborrheic keratosis     Erythematous Silvery Scaling Plaque c/w Psoriasis     See annotation      No images are attached to the encounter or orders placed in the encounter.    [] Data reviewed  [] Independent review of test  [] Management discussed with another provider    Assessment / Plan:        Solar purpura  - Recommend Dermend lotion    Milia   - minor problem and chronic.   Reassurance given to patient. No treatment necessary.              LOS NUMBER AND COMPLEXITY OF PROBLEMS    COMPLEXITY OF DATA RISK TOTAL TIME (m)   76029  [id number] [] 1 self-limited or minor problem [x] Minimal to none [] No treatment recommended or patient to monitor 15-29  10-19   68982  33423 Low  [] 2 or > self limited or minor problems  [] 1 stable chronic illness  [] 1 acute, uncomplicated illness or injury Limited (2)  [] Prior external notes from each unique source  [] Review result of each unique test  [] Order each unique test [x]  Low  OTC medications, minor skin biopsy 30-44 20-29   32203  81367 Moderate  []  1 or > chronic illness with progression, exacerbation or SE of treatment  [x]  2 or more stable chronic illnesses  []  1 acute illness with systemic symptoms  []  1 acute complicated injury  []  1 undiagnosed new problem with uncertain prognosis Moderate (1/3 below)  []  3 or more data items        *Now includes assessment requiring  independent historian  []  Independent interpretation of a test  []  Discuss management/test with another provider Moderate  []  Prescription drug mgmt  []  Minor surgery with risk discussed  []  Mgmt limited by social determinates 45-59  30-39   16372  74703 High  []  1 or more chronic illness with severe exacerbation, progression or SE of treatment  []  1 acute or chronic illness/injury that poses a threat to life or bodily function Extensive (2/3 below)  []  3 or more data items        *Now includes assessment requiring independent historian.  []  Independent interpretation of a test  []  Discuss management/test with another provider High  []  Major surgery with risk discussed  []  Drug therapy requiring intensive monitoring for toxicity  []  Hospitalization  []  Decision for DNR 60-74  40-54      No follow-ups on file.    Arpita Zamarripa MD, FAAD  [facility]

## 2024-05-14 ENCOUNTER — HOSPITAL ENCOUNTER (OUTPATIENT)
Dept: RADIOLOGY | Facility: HOSPITAL | Age: 69
Discharge: HOME | End: 2024-05-14
Attending: FAMILY MEDICINE
Payer: MEDICARE

## 2024-05-14 ENCOUNTER — OFFICE VISIT (OUTPATIENT)
Dept: OBSTETRICS AND GYNECOLOGY | Facility: CLINIC | Age: 69
End: 2024-05-14
Payer: MEDICARE

## 2024-05-14 VITALS — BODY MASS INDEX: 22.51 KG/M2 | WEIGHT: 114.63 LBS | HEIGHT: 60 IN

## 2024-05-14 VITALS
SYSTOLIC BLOOD PRESSURE: 112 MMHG | BODY MASS INDEX: 23.67 KG/M2 | HEIGHT: 60 IN | DIASTOLIC BLOOD PRESSURE: 68 MMHG | WEIGHT: 120.56 LBS

## 2024-05-14 DIAGNOSIS — Z12.31 ENCOUNTER FOR SCREENING MAMMOGRAM FOR BREAST CANCER: ICD-10-CM

## 2024-05-14 DIAGNOSIS — Z01.419 ENCOUNTER FOR GYNECOLOGICAL EXAMINATION WITHOUT ABNORMAL FINDING: Primary | ICD-10-CM

## 2024-05-14 DIAGNOSIS — Z11.3 SCREENING FOR STD (SEXUALLY TRANSMITTED DISEASE): ICD-10-CM

## 2024-05-14 DIAGNOSIS — Z78.0 POSTMENOPAUSAL: ICD-10-CM

## 2024-05-14 PROCEDURE — 88175 CYTOPATH C/V AUTO FLUID REDO: CPT | Performed by: NURSE PRACTITIONER

## 2024-05-14 PROCEDURE — 87491 CHLMYD TRACH DNA AMP PROBE: CPT | Performed by: NURSE PRACTITIONER

## 2024-05-14 PROCEDURE — G0101 CA SCREEN;PELVIC/BREAST EXAM: HCPCS | Mod: S$GLB,,, | Performed by: NURSE PRACTITIONER

## 2024-05-14 PROCEDURE — 3288F FALL RISK ASSESSMENT DOCD: CPT | Mod: CPTII,S$GLB,, | Performed by: NURSE PRACTITIONER

## 2024-05-14 PROCEDURE — 1101F PT FALLS ASSESS-DOCD LE1/YR: CPT | Mod: CPTII,S$GLB,, | Performed by: NURSE PRACTITIONER

## 2024-05-14 PROCEDURE — 1160F RVW MEDS BY RX/DR IN RCRD: CPT | Mod: CPTII,S$GLB,, | Performed by: NURSE PRACTITIONER

## 2024-05-14 PROCEDURE — 99999 PR PBB SHADOW E&M-EST. PATIENT-LVL IV: CPT | Mod: PBBFAC,,, | Performed by: NURSE PRACTITIONER

## 2024-05-14 PROCEDURE — 77067 SCR MAMMO BI INCL CAD: CPT | Mod: 26,,, | Performed by: RADIOLOGY

## 2024-05-14 PROCEDURE — 77067 SCR MAMMO BI INCL CAD: CPT | Mod: TC

## 2024-05-14 PROCEDURE — 3078F DIAST BP <80 MM HG: CPT | Mod: CPTII,S$GLB,, | Performed by: NURSE PRACTITIONER

## 2024-05-14 PROCEDURE — 1126F AMNT PAIN NOTED NONE PRSNT: CPT | Mod: CPTII,S$GLB,, | Performed by: NURSE PRACTITIONER

## 2024-05-14 PROCEDURE — 77063 BREAST TOMOSYNTHESIS BI: CPT | Mod: 26,,, | Performed by: RADIOLOGY

## 2024-05-14 PROCEDURE — 3074F SYST BP LT 130 MM HG: CPT | Mod: CPTII,S$GLB,, | Performed by: NURSE PRACTITIONER

## 2024-05-14 PROCEDURE — 3044F HG A1C LEVEL LT 7.0%: CPT | Mod: CPTII,S$GLB,, | Performed by: NURSE PRACTITIONER

## 2024-05-14 PROCEDURE — 87624 HPV HI-RISK TYP POOLED RSLT: CPT | Performed by: NURSE PRACTITIONER

## 2024-05-14 PROCEDURE — 1159F MED LIST DOCD IN RCRD: CPT | Mod: CPTII,S$GLB,, | Performed by: NURSE PRACTITIONER

## 2024-05-14 RX ORDER — ALENDRONATE SODIUM 70 MG/1
1 TABLET ORAL
COMMUNITY
Start: 2024-04-05

## 2024-05-14 RX ORDER — MULTIVITAMIN
1 TABLET ORAL DAILY
COMMUNITY

## 2024-05-14 NOTE — PROGRESS NOTES
CC: Well woman exam    Jaylyn May is a 68 y.o. female  presents for well woman exam.  LMP: No LMP recorded. Patient is postmenopausal..    Has a new sexual partner and would like STD workup  Mmg done today.    Past Medical History:   Diagnosis Date    Abnormal Pap smear     over 15 years     COVID-19     End of April    Pituitary cyst     Supraventricular tachycardia      Past Surgical History:   Procedure Laterality Date     SECTION      MANDIBLE FRACTURE SURGERY       Social History     Socioeconomic History    Marital status:    Tobacco Use    Smoking status: Never    Smokeless tobacco: Never   Substance and Sexual Activity    Alcohol use: Yes     Alcohol/week: 8.0 standard drinks of alcohol     Types: 8 Glasses of wine per week     Comment: socally     Drug use: No    Sexual activity: Yes     Partners: Male     Birth control/protection: None, Post-menopausal   Social History Narrative    Wears a seatbelt.     Social Determinants of Health     Financial Resource Strain: Low Risk  (2024)    Overall Financial Resource Strain (CARDIA)     Difficulty of Paying Living Expenses: Not hard at all   Food Insecurity: No Food Insecurity (2024)    Hunger Vital Sign     Worried About Running Out of Food in the Last Year: Never true     Ran Out of Food in the Last Year: Never true   Transportation Needs: No Transportation Needs (2024)    TRANSPORTATION NEEDS     Transportation : No   Physical Activity: Inactive (2024)    Exercise Vital Sign     Days of Exercise per Week: 0 days     Minutes of Exercise per Session: 0 min   Stress: No Stress Concern Present (2024)    Citizen of [place] [employer] - Occupational Stress Questionnaire     Feeling of Stress : Only a little   Housing Stability: High Risk (2024)    Housing Stability Vital Sign     Unable to Pay for Housing in the Last Year: Yes     Homeless in the Last Year: Yes     Family History   Problem Relation  Name Age of Onset    Hypertension Mother      Cataracts Mother      Blindness Maternal Aunt      Hypertension Maternal Aunt      Cataracts Maternal Aunt      Breast cancer Sister      Cancer Maternal Grandmother      Breast cancer Maternal Grandmother      Leukemia Paternal Aunt       OB History          2    Para   2    Term   2            AB        Living   2         SAB        IAB        Ectopic        Multiple        Live Births   2                 /68   Ht 5' (1.524 m)   Wt 54.7 kg (120 lb 9.5 oz)   BMI 23.55 kg/m²       ROS:  Per hpi    PHYSICAL EXAM:  APPEARANCE: Well nourished, well developed, in no acute distress.  AFFECT: WNL, alert and oriented x 3  SKIN: No acne or hirsutism  NECK: Neck symmetric without masses or thyromegaly  NODES: No inguinal, cervical, axillary, or femoral lymph node enlargement  CHEST: Good respiratory effect  ABDOMEN: Soft.  No tenderness or masses.  No hepatosplenomegaly.  No hernias.  BREASTS: Symmetrical, no skin changes or visible lesions.  No palpable masses, nipple discharge bilaterally.  PELVIC: Normal external genitalia without lesions.  Normal hair distribution.  Adequate perineal body, normal urethral meatus.  Vagina atrophic without lesions or discharge.  Cervix pink, without lesions, discharge or tenderness.  No significant cystocele or rectocele.  Bimanual exam shows uterus to be normal size, regular, mobile and nontender.  Adnexa without masses or tenderness.    EXTREMITIES: No edema.  Physical Exam    1. Encounter for gynecological examination without abnormal finding  Liquid-Based Pap Smear, Screening    HPV High Risk Genotypes, PCR      2. Postmenopausal        3. Screening for STD (sexually transmitted disease)  C. trachomatis/N. gonorrhoeae by AMP DNA    HIV 1/2 Ag/Ab (4th Gen)    Hepatitis Panel, Acute    Treponema Pallidium Antibodies IgG, IgM       AND PLAN:    Jaylyn was seen today for annual exam.    Diagnoses and all orders for this  visit:    Encounter for gynecological examination without abnormal finding  -     Liquid-Based Pap Smear, Screening  -     HPV High Risk Genotypes, PCR    Postmenopausal    Screening for STD (sexually transmitted disease)  -     C. trachomatis/N. gonorrhoeae by AMP DNA  -     HIV 1/2 Ag/Ab (4th Gen); Future  -     Hepatitis Panel, Acute; Future  -     Treponema Pallidium Antibodies IgG, IgM; Future         Patient was counseled today on A.C.S. Pap guidelines and recommendations for yearly pelvic exams, mammograms and monthly self breast exams; to see her PCP for other health maintenance.

## 2024-05-15 ENCOUNTER — PATIENT MESSAGE (OUTPATIENT)
Dept: OBSTETRICS AND GYNECOLOGY | Facility: CLINIC | Age: 69
End: 2024-05-15
Payer: MEDICARE

## 2024-05-17 LAB
C TRACH DNA SPEC QL NAA+PROBE: NOT DETECTED
N GONORRHOEA DNA SPEC QL NAA+PROBE: NOT DETECTED

## 2024-05-22 LAB
FINAL PATHOLOGIC DIAGNOSIS: NORMAL
Lab: NORMAL

## 2024-05-28 DIAGNOSIS — I47.10 PAROXYSMAL SUPRAVENTRICULAR TACHYCARDIA: Primary | ICD-10-CM

## 2024-08-15 DIAGNOSIS — I47.10 PAROXYSMAL SUPRAVENTRICULAR TACHYCARDIA: Primary | ICD-10-CM

## 2024-08-16 ENCOUNTER — TELEPHONE (OUTPATIENT)
Dept: CARDIOLOGY | Facility: CLINIC | Age: 69
End: 2024-08-16
Payer: MEDICARE

## 2024-09-17 ENCOUNTER — TELEPHONE (OUTPATIENT)
Dept: CARDIOLOGY | Facility: CLINIC | Age: 69
End: 2024-09-17
Payer: MEDICARE

## 2024-09-17 NOTE — TELEPHONE ENCOUNTER
Contacted PT and left VM to call clinic and schedule with anyone who answers        ----- Message from Melissa Degroot sent at 9/17/2024  2:40 PM CDT -----  Contact: [phone]@[email or web address]  Patient is returning a phone call.yes     Who left a message for the patient: Sandra Salas LPN    Does patient know what this is regarding:  yes     Would you like a call back, or a response through your [facility] portal?:   call back     Comments:

## 2024-09-23 ENCOUNTER — OFFICE VISIT (OUTPATIENT)
Dept: CARDIOLOGY | Facility: CLINIC | Age: 69
End: 2024-09-23
Payer: MEDICARE

## 2024-09-23 ENCOUNTER — HOSPITAL ENCOUNTER (OUTPATIENT)
Dept: CARDIOLOGY | Facility: HOSPITAL | Age: 69
Discharge: HOME | End: 2024-09-23
Payer: MEDICARE

## 2024-09-23 VITALS
WEIGHT: 123.38 LBS | RESPIRATION RATE: 16 BRPM | OXYGEN SATURATION: 99 % | HEIGHT: 60 IN | BODY MASS INDEX: 24.22 KG/M2 | HEART RATE: 69 BPM | DIASTOLIC BLOOD PRESSURE: 68 MMHG | SYSTOLIC BLOOD PRESSURE: 118 MMHG

## 2024-09-23 DIAGNOSIS — G43.809 OTHER MIGRAINE WITHOUT STATUS MIGRAINOSUS, NOT INTRACTABLE: ICD-10-CM

## 2024-09-23 DIAGNOSIS — R07.9 CHEST PAIN, UNSPECIFIED TYPE: Primary | ICD-10-CM

## 2024-09-23 DIAGNOSIS — R07.89 CHEST TIGHTNESS: Primary | ICD-10-CM

## 2024-09-23 DIAGNOSIS — E78.00 PURE HYPERCHOLESTEROLEMIA: ICD-10-CM

## 2024-09-23 DIAGNOSIS — I47.10 PAROXYSMAL SUPRAVENTRICULAR TACHYCARDIA: ICD-10-CM

## 2024-09-23 DIAGNOSIS — F41.1 ANXIETY STATE: ICD-10-CM

## 2024-09-23 PROCEDURE — 1159F MED LIST DOCD IN RCRD: CPT | Mod: CPTII,S$GLB,, | Performed by: STUDENT IN AN ORGANIZED HEALTH CARE EDUCATION/TRAINING PROGRAM

## 2024-09-23 PROCEDURE — 93010 ELECTROCARDIOGRAM REPORT: CPT | Mod: ,,, | Performed by: INTERNAL MEDICINE

## 2024-09-23 PROCEDURE — 1101F PT FALLS ASSESS-DOCD LE1/YR: CPT | Mod: CPTII,S$GLB,, | Performed by: STUDENT IN AN ORGANIZED HEALTH CARE EDUCATION/TRAINING PROGRAM

## 2024-09-23 PROCEDURE — 99999 PR PBB SHADOW E&M-EST. PATIENT-LVL III: CPT | Mod: PBBFAC,,, | Performed by: STUDENT IN AN ORGANIZED HEALTH CARE EDUCATION/TRAINING PROGRAM

## 2024-09-23 PROCEDURE — 3008F BODY MASS INDEX DOCD: CPT | Mod: CPTII,S$GLB,, | Performed by: STUDENT IN AN ORGANIZED HEALTH CARE EDUCATION/TRAINING PROGRAM

## 2024-09-23 PROCEDURE — 3044F HG A1C LEVEL LT 7.0%: CPT | Mod: CPTII,S$GLB,, | Performed by: STUDENT IN AN ORGANIZED HEALTH CARE EDUCATION/TRAINING PROGRAM

## 2024-09-23 PROCEDURE — 99214 OFFICE O/P EST MOD 30 MIN: CPT | Mod: S$GLB,,, | Performed by: STUDENT IN AN ORGANIZED HEALTH CARE EDUCATION/TRAINING PROGRAM

## 2024-09-23 PROCEDURE — 93005 ELECTROCARDIOGRAM TRACING: CPT

## 2024-09-23 PROCEDURE — 3288F FALL RISK ASSESSMENT DOCD: CPT | Mod: CPTII,S$GLB,, | Performed by: STUDENT IN AN ORGANIZED HEALTH CARE EDUCATION/TRAINING PROGRAM

## 2024-09-23 PROCEDURE — 3074F SYST BP LT 130 MM HG: CPT | Mod: CPTII,S$GLB,, | Performed by: STUDENT IN AN ORGANIZED HEALTH CARE EDUCATION/TRAINING PROGRAM

## 2024-09-23 PROCEDURE — 3078F DIAST BP <80 MM HG: CPT | Mod: CPTII,S$GLB,, | Performed by: STUDENT IN AN ORGANIZED HEALTH CARE EDUCATION/TRAINING PROGRAM

## 2024-09-23 NOTE — PROGRESS NOTES
Subjective:   Patient ID:  Jaylyn May is a 68 y.o. female who presents for follow up of No chief complaint on file.      HPI  A 66 YO FEMALE WITH PSVT MIGRAINE USED TO FOLLOW WITH dr swift she is here for yearly follow up she is fairly active needs more regimented aerobic exercise has very rare palpitation last few minutes at worse. Has no chest pain no shortness of breath compliant with b blockers no syncope .        22  Sees Dr. Garza in cardiology clinic. Would like a female physician   Does not exercise regularly. Used to exercises regularly  Tries to be active at home   Weight has been stable  Avoids certain foods   Retired nurse   was diagnosed with lymphoma years ago, doing well now   denies tobacco abuse. ETOH 1-2 glasses wine nightly     Denies any symptoms. No palpitatons         23  Lipid panel stable     last December due to sickness  Does not feel good this morning  Palpations are intermittent, very rare  Sometimes gets chest pain, SOB   Thinks due to stress at times   Lost 11 lbs since   Appetite still poor       24  Has been having more anxiety  Have more chest tightness, 2-3 x a month  Has been having more palpitations with the chest tightness  Some SOB  Most of the time with activity  BP stable   No recent illness   Has had long drives recently, but stops in between to let dogs out of the care  Still on toprol xl 12.5 at night- another half if needs it but rare  No new meds   Has been stressed   Appetite getting better  Dances once a week     Denies syncope, PND, LE swelling    Family hx: maternal aunt- CAD s/p PCI        EKG 24 NSR  EKG 23 NSR  EKG 22 NSR, nonspecific ST abn, qtc 412 ms     Echo   Normal systolic function.  The estimated ejection fraction is 60%.  Normal left ventricular diastolic function.  Normal right ventricular size with normal right ventricular systolic function.  Mild aortic regurgitation.  Mild mitral  regurgitation.  Mild tricuspid regurgitation.  Normal central venous pressure (3 mmHg).  The estimated PA systolic pressure is 33 mmHg.        Past Medical History:   Diagnosis Date    Abnormal Pap smear     over 15 years     COVID-19     End of April    Pituitary cyst     Supraventricular tachycardia        Past Surgical History:   Procedure Laterality Date     SECTION      MANDIBLE FRACTURE SURGERY         Social History     Tobacco Use    Smoking status: Never    Smokeless tobacco: Never   Substance Use Topics    Alcohol use: Yes     Alcohol/week: 8.0 standard drinks of alcohol     Types: 8 Glasses of wine per week     Comment: socally     Drug use: No       Family History   Problem Relation Name Age of Onset    Hypertension Mother      Cataracts Mother      Blindness Maternal Aunt      Hypertension Maternal Aunt      Cataracts Maternal Aunt      Breast cancer Sister      Cancer Maternal Grandmother      Breast cancer Maternal Grandmother      Leukemia Paternal Aunt         Current Outpatient Medications   Medication Sig    alendronate (FOSAMAX) 70 MG tablet Take 1 tablet by mouth every 7 days.    aspirin (ECOTRIN) 81 MG EC tablet Take 81 mg by mouth once daily.    diphenhydrAMINE-acetaminophen (TYLENOL PM)  mg Tab Take 1 tablet by mouth nightly.    GARLIC EXTRACT ORAL Take 1 capsule by mouth once daily.    LORazepam (ATIVAN) 1 MG tablet Take one tablet by mouth every 6 (six) hours as needed. (Patient taking differently: Take 1 mg by mouth every 6 (six) hours as needed.)    metoprolol succinate (TOPROL-XL) 25 MG 24 hr tablet TAKE 1/2 TABLET ONCE DAILY    simvastatin (ZOCOR) 20 MG tablet Take 1 tablet by mouth once daily.    venlafaxine (EFFEXOR-XR) 150 MG Cp24 Take 150 mg by mouth once daily.    ibandronate (BONIVA) 150 mg tablet take one tablet by mouth every 30 days as directed    multivitamin (THERAGRAN) per tablet Take 1 tablet by mouth once daily.     No current facility-administered  medications for this visit.     Current Outpatient Medications on File Prior to Visit   Medication Sig    alendronate (FOSAMAX) 70 MG tablet Take 1 tablet by mouth every 7 days.    aspirin (ECOTRIN) 81 MG EC tablet Take 81 mg by mouth once daily.    diphenhydrAMINE-acetaminophen (TYLENOL PM)  mg Tab Take 1 tablet by mouth nightly.    GARLIC EXTRACT ORAL Take 1 capsule by mouth once daily.    LORazepam (ATIVAN) 1 MG tablet Take one tablet by mouth every 6 (six) hours as needed. (Patient taking differently: Take 1 mg by mouth every 6 (six) hours as needed.)    metoprolol succinate (TOPROL-XL) 25 MG 24 hr tablet TAKE 1/2 TABLET ONCE DAILY    simvastatin (ZOCOR) 20 MG tablet Take 1 tablet by mouth once daily.    venlafaxine (EFFEXOR-XR) 150 MG Cp24 Take 150 mg by mouth once daily.    ibandronate (BONIVA) 150 mg tablet take one tablet by mouth every 30 days as directed    multivitamin (THERAGRAN) per tablet Take 1 tablet by mouth once daily.     No current facility-administered medications on file prior to visit.     Review of patient's allergies indicates:   Allergen Reactions    Penicillins      rash     Review of Systems   Constitutional: Negative for diaphoresis, malaise/fatigue and weight gain.   HENT:  Negative for hoarse voice.    Eyes:  Negative for double vision and visual disturbance.   Cardiovascular:  Negative for chest pain, claudication, cyanosis, dyspnea on exertion, irregular heartbeat, leg swelling, near-syncope, orthopnea, paroxysmal nocturnal dyspnea and syncope.   Respiratory:  Negative for cough, hemoptysis, shortness of breath and snoring.    Hematologic/Lymphatic: Negative for bleeding problem. Does not bruise/bleed easily.   Skin:  Negative for color change and poor wound healing.   Musculoskeletal:  Negative for muscle cramps, muscle weakness and myalgias.   Gastrointestinal:  Negative for bloating, abdominal pain, change in bowel habit, diarrhea, heartburn, hematemesis, hematochezia,  melena and nausea.   Neurological:  Negative for excessive daytime sleepiness, dizziness, headaches, light-headedness, loss of balance, numbness and weakness.   Psychiatric/Behavioral:  Negative for memory loss. The patient does not have insomnia.    Allergic/Immunologic: Negative for hives.             Objective:     Physical exam     The pt is alert and oriented X 3  HEENT: No Icterus/Pale Conjunctiva/LAD  CVS     : No abnormal cardiac pulsations noted on inspection. JVP not raised.                 The apical impulse is normal on palpation, and is located in the left 5th                intercostal space in the mid - clavicular line. No palpable thrills or abnormal                pulsations noted. RR, S1 - S2 heard, no murmurs, rubs or gallops appreciated.   PUL     : CTA B/L. No wheezes/crackles heard   ABD     : BS +, soft. No tenderness elicited   LE        : No C/C/E. Distal Pulses palpable B/L         Vitals:    09/23/24 1057   BP: 118/68   Pulse: 69   Resp: 16   SpO2: 99%   Weight: 56 kg (123 lb 5.6 oz)   Height: 5' (1.524 m)     Lab Results   Component Value Date    CHOL 191 06/03/2022    CHOL 192 02/27/2020    CHOL 225 (H) 09/08/2011     Lab Results   Component Value Date    HDL 60 06/03/2022    HDL 60 02/27/2020    HDL 64 09/08/2011     Lab Results   Component Value Date    LDLCALC 112.4 06/03/2022    LDLCALC 113.6 02/27/2020    LDLCALC 149.6 09/08/2011     Lab Results   Component Value Date    TRIG 93 06/03/2022    TRIG 92 02/27/2020    TRIG 57 09/08/2011     Lab Results   Component Value Date    CHOLHDL 31.4 06/03/2022    CHOLHDL 31.3 02/27/2020    CHOLHDL 28.4 09/08/2011       Chemistry        Component Value Date/Time     08/08/2019 1417    K 4.3 08/08/2019 1417     08/08/2019 1417    CO2 27 08/08/2019 1417    BUN 11 08/08/2019 1417    CREATININE 0.7 08/08/2019 1417     08/08/2019 1417        Component Value Date/Time    CALCIUM 9.8 08/08/2019 1417    ALKPHOS 69 09/08/2011 0848     "AST 22 09/08/2011 0848    ALT 23 09/08/2011 0848    BILITOT 0.5 09/08/2011 0848    ESTGFRAFRICA >60.0 08/08/2019 1417    EGFRNONAA >60.0 08/08/2019 1417          Lab Results   Component Value Date    TSH 0.14 (L) 03/15/2024     No results found for: "INR", "PROTIME"  Lab Results   Component Value Date    WBC 7.31 02/27/2020    HGB 12.9 02/27/2020    HCT 41.8 02/27/2020    MCV 95 02/27/2020     02/27/2020     BMP  Sodium   Date Value Ref Range Status   08/08/2019 141 136 - 145 mmol/L Final     Potassium   Date Value Ref Range Status   08/08/2019 4.3 3.5 - 5.1 mmol/L Final     Chloride   Date Value Ref Range Status   08/08/2019 104 95 - 110 mmol/L Final     CO2   Date Value Ref Range Status   08/08/2019 27 23 - 29 mmol/L Final     BUN   Date Value Ref Range Status   08/08/2019 11 8 - 23 mg/dL Final     Creatinine   Date Value Ref Range Status   08/08/2019 0.7 0.5 - 1.4 mg/dL Final     Calcium   Date Value Ref Range Status   08/08/2019 9.8 8.7 - 10.5 mg/dL Final     Anion Gap   Date Value Ref Range Status   08/08/2019 10 8 - 16 mmol/L Final     eGFR if    Date Value Ref Range Status   08/08/2019 >60.0 >60 mL/min/1.73 m^2 Final     eGFR if non    Date Value Ref Range Status   08/08/2019 >60.0 >60 mL/min/1.73 m^2 Final     Comment:     Calculation used to obtain the estimated glomerular filtration  rate (eGFR) is the CKD-EPI equation.        CrCl cannot be calculated (Patient's most recent lab result is older than the maximum 7 days allowed.).    Assessment:     1. Other migraine without status migrainosus, not intractable    2. Paroxysmal supraventricular tachycardia    3. Anxiety state    4. Pure hypercholesterolemia        Drinking more coffee  2-3 cups and coke a day   Symptoms of chest tightness with palpitations are about 2-3 times in the month   Has not needed extra doses of her metoprolol  Will get ETT and echo.  If normal, patient will have to cut back on caffeine and " reduce stress    Plan:   Obtain ETT and echo  Continue current therapy  Cardiac low salt diet.  Risk factor modification and excercise program.    All labs and studies reviewed.     F/u in 3m    Isabelle Roque MD  Cardiology Staff

## 2024-09-24 LAB
OHS QRS DURATION: 76 MS
OHS QTC CALCULATION: 407 MS

## 2024-12-11 ENCOUNTER — PATIENT MESSAGE (OUTPATIENT)
Dept: CARDIOLOGY | Facility: CLINIC | Age: 69
End: 2024-12-11
Payer: MEDICARE

## 2024-12-11 ENCOUNTER — PATIENT MESSAGE (OUTPATIENT)
Dept: CARDIOLOGY | Facility: HOSPITAL | Age: 69
End: 2024-12-11
Payer: MEDICARE

## 2024-12-13 ENCOUNTER — PATIENT MESSAGE (OUTPATIENT)
Dept: CARDIOLOGY | Facility: CLINIC | Age: 69
End: 2024-12-13
Payer: MEDICARE

## 2025-01-27 ENCOUNTER — HOSPITAL ENCOUNTER (OUTPATIENT)
Dept: CARDIOLOGY | Facility: HOSPITAL | Age: 70
Discharge: HOME | End: 2025-01-27
Attending: STUDENT IN AN ORGANIZED HEALTH CARE EDUCATION/TRAINING PROGRAM
Payer: MEDICARE

## 2025-01-27 VITALS
WEIGHT: 123 LBS | HEIGHT: 60 IN | SYSTOLIC BLOOD PRESSURE: 118 MMHG | BODY MASS INDEX: 24.15 KG/M2 | DIASTOLIC BLOOD PRESSURE: 68 MMHG

## 2025-01-27 DIAGNOSIS — G43.809 OTHER MIGRAINE WITHOUT STATUS MIGRAINOSUS, NOT INTRACTABLE: ICD-10-CM

## 2025-01-27 DIAGNOSIS — I47.10 PAROXYSMAL SUPRAVENTRICULAR TACHYCARDIA: ICD-10-CM

## 2025-01-27 DIAGNOSIS — R07.89 CHEST TIGHTNESS: ICD-10-CM

## 2025-01-27 DIAGNOSIS — E78.00 PURE HYPERCHOLESTEROLEMIA: ICD-10-CM

## 2025-01-27 DIAGNOSIS — F41.1 ANXIETY STATE: ICD-10-CM

## 2025-01-27 LAB
AORTIC ROOT ANNULUS: 2.38 CM
ASCENDING AORTA: 3.02 CM
AV INDEX (PROSTH): 0.91
AV MEAN GRADIENT: 5 MMHG
AV PEAK GRADIENT: 9 MMHG
AV REGURGITATION PRESSURE HALF TIME: 612 MS
AV VALVE AREA BY VELOCITY RATIO: 2.5 CM²
AV VALVE AREA: 2.9 CM²
AV VELOCITY RATIO: 0.8
BSA FOR ECHO PROCEDURE: 1.54 M2
CV ECHO LV RWT: 0.29 CM
CV STRESS BASE HR: 71 BPM
DIASTOLIC BLOOD PRESSURE: 62 MMHG
DOP CALC AO PEAK VEL: 1.5 M/S
DOP CALC AO VTI: 32.3 CM
DOP CALC LVOT AREA: 3.1 CM2
DOP CALC LVOT DIAMETER: 2 CM
DOP CALC LVOT PEAK VEL: 1.2 M/S
DOP CALC LVOT STROKE VOLUME: 92.6 CM3
DOP CALC RVOT PEAK VEL: 0.74 M/S
DOP CALC RVOT VTI: 16.3 CM
DOP CALCLVOT PEAK VEL VTI: 29.5 CM
E WAVE DECELERATION TIME: 217 MSEC
E/A RATIO: 1
E/E' RATIO: 8 M/S
ECHO LV POSTERIOR WALL: 0.6 CM (ref 0.6–1.1)
FRACTIONAL SHORTENING: 31 % (ref 28–44)
INTERVENTRICULAR SEPTUM: 0.8 CM (ref 0.6–1.1)
IVC DIAMETER: 1.78 CM
IVRT: 88 MSEC
LA MAJOR: 3.6 CM
LA MINOR: 4 CM
LA WIDTH: 3.3 CM
LEFT ATRIUM AREA SYSTOLIC (APICAL 2 CHAMBER): 11.16 CM2
LEFT ATRIUM AREA SYSTOLIC (APICAL 4 CHAMBER): 12.17 CM2
LEFT ATRIUM SIZE: 2.9 CM
LEFT ATRIUM VOLUME INDEX MOD: 20 ML/M2
LEFT ATRIUM VOLUME INDEX: 20 ML/M2
LEFT ATRIUM VOLUME MOD: 30 ML
LEFT ATRIUM VOLUME: 31 CM3
LEFT INTERNAL DIMENSION IN SYSTOLE: 2.9 CM (ref 2.1–4)
LEFT VENTRICLE DIASTOLIC VOLUME INDEX: 50.12 ML/M2
LEFT VENTRICLE DIASTOLIC VOLUME: 76.18 ML
LEFT VENTRICLE END SYSTOLIC VOLUME APICAL 2 CHAMBER: 26.92 ML
LEFT VENTRICLE END SYSTOLIC VOLUME APICAL 4 CHAMBER: 32.63 ML
LEFT VENTRICLE MASS INDEX: 56 G/M2
LEFT VENTRICLE SYSTOLIC VOLUME INDEX: 20.3 ML/M2
LEFT VENTRICLE SYSTOLIC VOLUME: 30.84 ML
LEFT VENTRICULAR INTERNAL DIMENSION IN DIASTOLE: 4.2 CM (ref 3.5–6)
LEFT VENTRICULAR MASS: 85.1 G
LV LATERAL E/E' RATIO: 8.4 M/S
LV SEPTAL E/E' RATIO: 8.4 M/S
LVED V (TEICH): 76.18 ML
LVES V (TEICH): 30.84 ML
LVOT MG: 3.42 MMHG
LVOT MV: 0.86 CM/S
MV PEAK A VEL: 0.76 M/S
MV PEAK E VEL: 0.76 M/S
OHS CV CPX 85 PERCENT MAX PREDICTED HEART RATE MALE: 129
OHS CV CPX ESTIMATED METS: 10
OHS CV CPX MAX PREDICTED HEART RATE: 152
OHS CV CPX PATIENT IS FEMALE: 1
OHS CV CPX PATIENT IS MALE: 0
OHS CV CPX PEAK DIASTOLIC BLOOD PRESSURE: 52 MMHG
OHS CV CPX PEAK HEAR RATE: 130 BPM
OHS CV CPX PEAK RATE PRESSURE PRODUCT: NORMAL
OHS CV CPX PEAK SYSTOLIC BLOOD PRESSURE: 156 MMHG
OHS CV CPX PERCENT MAX PREDICTED HEART RATE ACHIEVED: 89
OHS CV CPX RATE PRESSURE PRODUCT PRESENTING: 8733
OHS CV RV/LV RATIO: 0.67 CM
PISA AR MAX VEL: 3.63 M/S
PISA TR MAX VEL: 2.3 M/S
PV MEAN GRADIENT: 1 MMHG
PV PEAK GRADIENT: 2 MMHG
PV PEAK VELOCITY: 0.83 M/S
RA MAJOR: 4.23 CM
RA PRESSURE ESTIMATED: 3 MMHG
RA WIDTH: 2.66 CM
RIGHT VENTRICLE DIASTOLIC BASEL DIMENSION: 2.8 CM
RIGHT VENTRICULAR END-DIASTOLIC DIMENSION: 2.78 CM
RV TB RVSP: 5 MMHG
SINUS: 2.38 CM
STJ: 2.3 CM
STRESS ECHO POST EXERCISE DUR MIN: 8 MINUTES
STRESS ECHO POST EXERCISE DUR SEC: 16 SECONDS
SYSTOLIC BLOOD PRESSURE: 123 MMHG
TDI LATERAL: 0.09 M/S
TDI SEPTAL: 0.09 M/S
TDI: 0.09 M/S
TR MAX PG: 21 MMHG
TRICUSPID ANNULAR PLANE SYSTOLIC EXCURSION: 2.24 CM
TV REST PULMONARY ARTERY PRESSURE: 24 MMHG
Z-SCORE OF LEFT VENTRICULAR DIMENSION IN END DIASTOLE: -0.58
Z-SCORE OF LEFT VENTRICULAR DIMENSION IN END SYSTOLE: 0.38

## 2025-01-27 PROCEDURE — 93017 CV STRESS TEST TRACING ONLY: CPT

## 2025-01-27 PROCEDURE — 93018 CV STRESS TEST I&R ONLY: CPT | Mod: ,,, | Performed by: INTERNAL MEDICINE

## 2025-01-27 PROCEDURE — 93306 TTE W/DOPPLER COMPLETE: CPT

## 2025-01-27 PROCEDURE — 93306 TTE W/DOPPLER COMPLETE: CPT | Mod: 26,,, | Performed by: INTERNAL MEDICINE

## 2025-01-27 PROCEDURE — 93016 CV STRESS TEST SUPVJ ONLY: CPT | Mod: ,,, | Performed by: INTERNAL MEDICINE

## 2025-02-04 ENCOUNTER — TELEPHONE (OUTPATIENT)
Dept: CARDIOLOGY | Facility: CLINIC | Age: 70
End: 2025-02-04
Payer: MEDICARE

## 2025-02-04 ENCOUNTER — OFFICE VISIT (OUTPATIENT)
Dept: CARDIOLOGY | Facility: CLINIC | Age: 70
End: 2025-02-04
Payer: MEDICARE

## 2025-02-04 VITALS
SYSTOLIC BLOOD PRESSURE: 124 MMHG | WEIGHT: 125.44 LBS | OXYGEN SATURATION: 98 % | BODY MASS INDEX: 24.5 KG/M2 | HEART RATE: 71 BPM | DIASTOLIC BLOOD PRESSURE: 62 MMHG

## 2025-02-04 DIAGNOSIS — I47.10 SVT (SUPRAVENTRICULAR TACHYCARDIA): Primary | ICD-10-CM

## 2025-02-04 DIAGNOSIS — I47.10 PAROXYSMAL SUPRAVENTRICULAR TACHYCARDIA: ICD-10-CM

## 2025-02-04 DIAGNOSIS — R00.2 PALPITATIONS: ICD-10-CM

## 2025-02-04 DIAGNOSIS — E78.00 PURE HYPERCHOLESTEROLEMIA: ICD-10-CM

## 2025-02-04 PROCEDURE — 1101F PT FALLS ASSESS-DOCD LE1/YR: CPT | Mod: CPTII,S$GLB,,

## 2025-02-04 PROCEDURE — 99214 OFFICE O/P EST MOD 30 MIN: CPT | Mod: S$GLB,,,

## 2025-02-04 PROCEDURE — 1160F RVW MEDS BY RX/DR IN RCRD: CPT | Mod: CPTII,S$GLB,,

## 2025-02-04 PROCEDURE — 3074F SYST BP LT 130 MM HG: CPT | Mod: CPTII,S$GLB,,

## 2025-02-04 PROCEDURE — 99999 PR PBB SHADOW E&M-EST. PATIENT-LVL III: CPT | Mod: PBBFAC,,,

## 2025-02-04 PROCEDURE — 3288F FALL RISK ASSESSMENT DOCD: CPT | Mod: CPTII,S$GLB,,

## 2025-02-04 PROCEDURE — 3008F BODY MASS INDEX DOCD: CPT | Mod: CPTII,S$GLB,,

## 2025-02-04 PROCEDURE — 1159F MED LIST DOCD IN RCRD: CPT | Mod: CPTII,S$GLB,,

## 2025-02-04 PROCEDURE — 3078F DIAST BP <80 MM HG: CPT | Mod: CPTII,S$GLB,,

## 2025-02-04 NOTE — PROGRESS NOTES
Subjective:   Patient ID:  Jaylyn May is a 69 y.o. female who presents for evaluation of No chief complaint on file.      HPI 69-year-old female whose current medical conditions include migraines, SVT, HLP, anxiety followed by Dr. Roque in Cardiology Clinic recently having symptoms of chest tightness and palpitations exercise stress test was negative for ischemia, heart ultrasound with normal function mild leakage of valves.  Here today for CV follow-up reports occasional chest discomfort can happen during and without activity with associated palpitations.  Reports compliance with medications.  Also states she had an episode where her fingers went numb for a few while she was in the kitchen cooking, denies any discoloration or pain in arms     EKG 24 NSR    Exercises- weekly, dances  Family hx: maternal aunt- CAD s/p PCI   Past Medical History:   Diagnosis Date    Abnormal Pap smear     over 15 years     COVID-19     End of April    Pituitary cyst     Supraventricular tachycardia        Past Surgical History:   Procedure Laterality Date     SECTION      MANDIBLE FRACTURE SURGERY         Social History     Tobacco Use    Smoking status: Never    Smokeless tobacco: Never   Substance Use Topics    Alcohol use: Yes     Alcohol/week: 8.0 standard drinks of alcohol     Types: 8 Glasses of wine per week     Comment: socally     Drug use: No       Family History   Problem Relation Name Age of Onset    Hypertension Mother      Cataracts Mother      Blindness Maternal Aunt      Hypertension Maternal Aunt      Cataracts Maternal Aunt      Breast cancer Sister      Cancer Maternal Grandmother      Breast cancer Maternal Grandmother      Leukemia Paternal Aunt         Current Outpatient Medications on File Prior to Visit   Medication Sig Dispense Refill    alendronate (FOSAMAX) 70 MG tablet Take 1 tablet by mouth every 7 days.      aspirin (ECOTRIN) 81 MG EC tablet Take 81 mg by mouth once daily.       diphenhydrAMINE-acetaminophen (TYLENOL PM)  mg Tab Take 1 tablet by mouth nightly.      GARLIC EXTRACT ORAL Take 1 capsule by mouth once daily.      LORazepam (ATIVAN) 1 MG tablet Take one tablet by mouth every 6 (six) hours as needed. (Patient taking differently: Take 1 mg by mouth every 6 (six) hours as needed.) 30 tablet 2    metoprolol succinate (TOPROL-XL) 25 MG 24 hr tablet TAKE 1/2 TABLET ONCE DAILY 45 tablet 3    multivitamin (THERAGRAN) per tablet Take 1 tablet by mouth once daily.      simvastatin (ZOCOR) 20 MG tablet Take 1 tablet by mouth once daily.      venlafaxine (EFFEXOR-XR) 150 MG Cp24 Take 150 mg by mouth once daily.      ibandronate (BONIVA) 150 mg tablet take one tablet by mouth every 30 days as directed 3 tablet 3     No current facility-administered medications on file prior to visit.      Wt Readings from Last 3 Encounters:   02/04/25 56.9 kg (125 lb 7.1 oz)   01/27/25 55.8 kg (123 lb)   09/23/24 56 kg (123 lb 5.6 oz)     Temp Readings from Last 3 Encounters:   08/23/20 98.7 °F (37.1 °C) (Temporal)   11/26/19 98.4 °F (36.9 °C) (Tympanic)   08/08/19 97.7 °F (36.5 °C)     BP Readings from Last 3 Encounters:   02/04/25 124/62   01/27/25 118/68   09/23/24 118/68     Pulse Readings from Last 3 Encounters:   02/04/25 71   09/23/24 69   05/25/23 65        Review of Systems   Constitutional: Negative.   HENT: Negative.     Eyes: Negative.    Cardiovascular:  Positive for chest pain and palpitations.   Respiratory: Negative.     Skin: Negative.    Musculoskeletal: Negative.    Gastrointestinal: Negative.    Genitourinary: Negative.    Neurological:  Positive for numbness.   Psychiatric/Behavioral: Negative.         Objective:   Physical Exam  Vitals and nursing note reviewed.   Constitutional:       Appearance: Normal appearance.   HENT:      Head: Normocephalic.   Eyes:      Pupils: Pupils are equal, round, and reactive to light.   Cardiovascular:      Rate and Rhythm: Normal rate and regular  "rhythm.      Heart sounds: Normal heart sounds, S1 normal and S2 normal. No murmur heard.     No S3 or S4 sounds.   Pulmonary:      Effort: Pulmonary effort is normal.      Breath sounds: Normal breath sounds.   Abdominal:      General: Bowel sounds are normal.      Palpations: Abdomen is soft.   Musculoskeletal:         General: Normal range of motion.      Cervical back: Normal range of motion.   Skin:     Capillary Refill: Capillary refill takes less than 2 seconds.   Neurological:      General: No focal deficit present.      Mental Status: She is alert and oriented to person, place, and time.   Psychiatric:         Mood and Affect: Mood normal.         Behavior: Behavior normal.         Thought Content: Thought content normal.         Lab Results   Component Value Date    CHOL 191 06/03/2022    CHOL 192 02/27/2020    CHOL 225 (H) 09/08/2011     Lab Results   Component Value Date    HDL 60 06/03/2022    HDL 60 02/27/2020    HDL 64 09/08/2011     Lab Results   Component Value Date    LDLCALC 112.4 06/03/2022    LDLCALC 113.6 02/27/2020    LDLCALC 149.6 09/08/2011     Lab Results   Component Value Date    TRIG 93 06/03/2022    TRIG 92 02/27/2020    TRIG 57 09/08/2011     Lab Results   Component Value Date    CHOLHDL 31.4 06/03/2022    CHOLHDL 31.3 02/27/2020    CHOLHDL 28.4 09/08/2011       Chemistry        Component Value Date/Time     08/08/2019 1417    K 4.3 08/08/2019 1417     08/08/2019 1417    CO2 27 08/08/2019 1417    BUN 11 08/08/2019 1417    CREATININE 0.7 08/08/2019 1417     08/08/2019 1417        Component Value Date/Time    CALCIUM 9.8 08/08/2019 1417    ALKPHOS 69 09/08/2011 0848    AST 22 09/08/2011 0848    ALT 23 09/08/2011 0848    BILITOT 0.5 09/08/2011 0848    ESTGFRAFRICA >60.0 08/08/2019 1417    EGFRNONAA >60.0 08/08/2019 1417          Lab Results   Component Value Date    TSH 0.14 (L) 03/15/2024     No results found for: "INR", " ""PROTIME"  @RESUFAST(WBC,HGB,HCT,MCV,PLT)  @LABRCNTIP(BNP,BNPTRIAGEBLO)@  CrCl cannot be calculated (Patient's most recent lab result is older than the maximum 7 days allowed.).     Results for orders placed during the hospital encounter of 01/27/25    Echo    Interpretation Summary    Left Ventricle: The left ventricle is normal in size. Ventricular mass is normal. Normal wall thickness. There is normal systolic function with a visually estimated ejection fraction of 55 - 60%. There is normal diastolic function.    Right Ventricle: Normal right ventricular cavity size. Wall thickness is normal. Systolic function is normal.    Aortic Valve: There is mild aortic regurgitation.    Mitral Valve: There is mild regurgitation.    Tricuspid Valve: There is mild regurgitation.    Pulmonary Artery: The estimated pulmonary artery systolic pressure is 24 mmHg.    IVC/SVC: Normal venous pressure at 3 mmHg.     Results for orders placed during the hospital encounter of 01/27/25    Exercise Stress - EKG    Interpretation Summary    The ECG portion of the study is negative for ischemia.    The patient reported no chest pain during the stress test.    The exercise capacity was normal.    The patient exercised for 8 minutes 16 seconds on a Samuel protocol, corresponding to a functional capacity of 10 estimated METS, achieving a peak heart rate of 130 bpm, which is 89% of the age predicted maximum heart rate. Their exercise capacity was normal.    Shortness of breath, claudication and flushing resolved prior to the end of the recovery period.     Assessment:      1. Paroxysmal supraventricular tachycardia    2. Pure hypercholesterolemia        Plan:   Paroxysmal supraventricular tachycardia    Pure hypercholesterolemia      Seven day vital    Metoprolol, limit caffeine intake SVT  Simvastatin, low-fat diet-HLP  Risk factor modifications   Low-sodium diet   Daily exercise as tolerated     Vital  Return to clinic in 6 months or sooner " if needed    Monica Grier, [facility], Cardiology

## 2025-02-04 NOTE — TELEPHONE ENCOUNTER
Spoke to patient she stated that she would be late to appt. She stated that she should be here at 1015. Provider said that is fine.KA

## 2025-02-06 ENCOUNTER — HOSPITAL ENCOUNTER (OUTPATIENT)
Dept: CARDIOLOGY | Facility: HOSPITAL | Age: 70
Discharge: HOME | End: 2025-02-06
Payer: MEDICARE

## 2025-02-06 DIAGNOSIS — R00.2 PALPITATIONS: ICD-10-CM

## 2025-02-06 DIAGNOSIS — I47.10 SVT (SUPRAVENTRICULAR TACHYCARDIA): ICD-10-CM

## 2025-02-06 PROCEDURE — 93244 EXT ECG>48HR<7D REV&INTERPJ: CPT | Mod: ,,, | Performed by: INTERNAL MEDICINE

## 2025-02-20 LAB
OHS CV HOLTER SINUS AVERAGE HR: 77
OHS CV HOLTER SINUS MAX HR: 129
OHS CV HOLTER SINUS MIN HR: 58

## 2025-02-25 ENCOUNTER — RESULTS FOLLOW-UP (OUTPATIENT)
Dept: CARDIOLOGY | Facility: HOSPITAL | Age: 70
End: 2025-02-25
Payer: MEDICARE

## 2025-02-26 NOTE — TELEPHONE ENCOUNTER
Contacted Pt and reviewed results and recommendations-Holter monitor with no concerning arrhythmias, short run of SVT noted. If still having palpitations can try increasing metoprolol to full tablet     PT stated verbal understanding    ----- Message from Monica Grier NP sent at 2/25/2025  9:16 PM CST -----  Holter monitor with no concerning arrhythmias, short run of SVT noted. If still having palpitations can try increasing metoprolol to full tablet  ----- Message -----  From: Shane Gunn MD  Sent: 2/20/2025  10:49 AM CST  To: Monica Grier NP

## 2025-07-08 ENCOUNTER — TELEPHONE (OUTPATIENT)
Dept: OBSTETRICS AND GYNECOLOGY | Facility: CLINIC | Age: 70
End: 2025-07-08
Payer: MEDICARE

## 2025-07-08 DIAGNOSIS — Z12.31 ENCOUNTER FOR SCREENING MAMMOGRAM FOR MALIGNANT NEOPLASM OF BREAST: Primary | ICD-10-CM

## 2025-07-08 NOTE — TELEPHONE ENCOUNTER
Copied from CRM #[id number]. Topic: Appointments - Amb Referral  >> Jul 8, 2025  3:40 PM Altagracia wrote:  Mrs. De La O needs a call back at .[phone] in regards to sending new orders for the patient to have her mammogram done and she also stated that she needs to see if its time for her pap smear.

## 2025-07-16 ENCOUNTER — HOSPITAL ENCOUNTER (OUTPATIENT)
Dept: RADIOLOGY | Facility: HOSPITAL | Age: 70
Discharge: HOME | End: 2025-07-16
Attending: NURSE PRACTITIONER
Payer: MEDICARE

## 2025-07-16 VITALS — HEIGHT: 60 IN | WEIGHT: 125.44 LBS | BODY MASS INDEX: 24.63 KG/M2

## 2025-07-16 DIAGNOSIS — Z12.31 ENCOUNTER FOR SCREENING MAMMOGRAM FOR MALIGNANT NEOPLASM OF BREAST: ICD-10-CM

## 2025-07-16 PROCEDURE — 77063 BREAST TOMOSYNTHESIS BI: CPT | Mod: 26,,, | Performed by: RADIOLOGY

## 2025-07-16 PROCEDURE — 77067 SCR MAMMO BI INCL CAD: CPT | Mod: TC

## 2025-07-16 PROCEDURE — 77067 SCR MAMMO BI INCL CAD: CPT | Mod: 26,,, | Performed by: RADIOLOGY

## 2025-08-04 ENCOUNTER — OFFICE VISIT (OUTPATIENT)
Dept: CARDIOLOGY | Facility: CLINIC | Age: 70
End: 2025-08-04
Payer: MEDICARE

## 2025-08-04 ENCOUNTER — HOSPITAL ENCOUNTER (OUTPATIENT)
Dept: CARDIOLOGY | Facility: HOSPITAL | Age: 70
Discharge: HOME | End: 2025-08-04
Payer: MEDICARE

## 2025-08-04 VITALS
DIASTOLIC BLOOD PRESSURE: 60 MMHG | SYSTOLIC BLOOD PRESSURE: 114 MMHG | OXYGEN SATURATION: 98 % | WEIGHT: 136.69 LBS | BODY MASS INDEX: 26.69 KG/M2 | HEART RATE: 63 BPM

## 2025-08-04 DIAGNOSIS — E78.00 PURE HYPERCHOLESTEROLEMIA: ICD-10-CM

## 2025-08-04 DIAGNOSIS — I47.10 PAROXYSMAL SUPRAVENTRICULAR TACHYCARDIA: Primary | ICD-10-CM

## 2025-08-04 DIAGNOSIS — I47.10 SVT (SUPRAVENTRICULAR TACHYCARDIA): Primary | ICD-10-CM

## 2025-08-04 DIAGNOSIS — I47.10 SVT (SUPRAVENTRICULAR TACHYCARDIA): ICD-10-CM

## 2025-08-04 LAB
OHS QRS DURATION: 80 MS
OHS QTC CALCULATION: 386 MS

## 2025-08-04 PROCEDURE — 93010 ELECTROCARDIOGRAM REPORT: CPT | Mod: ,,, | Performed by: INTERNAL MEDICINE

## 2025-08-04 PROCEDURE — 3008F BODY MASS INDEX DOCD: CPT | Mod: CPTII,S$GLB,,

## 2025-08-04 PROCEDURE — 1101F PT FALLS ASSESS-DOCD LE1/YR: CPT | Mod: CPTII,S$GLB,,

## 2025-08-04 PROCEDURE — 3288F FALL RISK ASSESSMENT DOCD: CPT | Mod: CPTII,S$GLB,,

## 2025-08-04 PROCEDURE — 93005 ELECTROCARDIOGRAM TRACING: CPT

## 2025-08-04 PROCEDURE — 3078F DIAST BP <80 MM HG: CPT | Mod: CPTII,S$GLB,,

## 2025-08-04 PROCEDURE — 99214 OFFICE O/P EST MOD 30 MIN: CPT | Mod: S$GLB,,,

## 2025-08-04 PROCEDURE — 3074F SYST BP LT 130 MM HG: CPT | Mod: CPTII,S$GLB,,

## 2025-08-04 PROCEDURE — 1160F RVW MEDS BY RX/DR IN RCRD: CPT | Mod: CPTII,S$GLB,,

## 2025-08-04 PROCEDURE — 1159F MED LIST DOCD IN RCRD: CPT | Mod: CPTII,S$GLB,,

## 2025-08-04 PROCEDURE — 99999 PR PBB SHADOW E&M-EST. PATIENT-LVL III: CPT | Mod: PBBFAC,,,

## 2025-08-04 RX ORDER — DICLOFENAC SODIUM 10 MG/G
4 GEL TOPICAL 3 TIMES DAILY PRN
COMMUNITY
Start: 2025-03-20

## 2025-08-04 NOTE — PROGRESS NOTES
Subjective:   Patient ID:  Jaylyn May is a 69 y.o. female who presents for evaluation of No chief complaint on file.      HPI 69-year-old female whose current medical conditions include migraines, SVT, HLP, anxiety followed by Dr. Roque in [facility] recently having symptoms of chest tightness and palpitations exercise stress test was negative for ischemia, heart ultrasound with normal function mild leakage of valves.  Here today for CV follow-up reports occasional chest discomfort can happen during and without activity with associated palpitations.  Reports compliance with medications.  Also states she had an episode where her fingers went numb for a few while she was in the kitchen cooking, denies any discoloration or pain in arms     EKG 24 NSR    Exercises- weekly, dances  Family hx: maternal aunt- CAD s/p PCI     2025   Here today for CV follow-up denies any chest pain, angina or anginal equivalent.  States she has not noticed palpitations in the past 6 months.  Blood pressure looks good today heart rate 63.  Still taking a half tablet of her metoprolol    Seven day vital connect with no concerning arrhythmia  Past Medical History:   Diagnosis Date    Abnormal Pap smear     over 15 years     COVID-19     End of April    Pituitary cyst     Supraventricular tachycardia        Past Surgical History:   Procedure Laterality Date     SECTION      MANDIBLE FRACTURE SURGERY         Social History     Tobacco Use    Smoking status: Never    Smokeless tobacco: Never   Substance Use Topics    Alcohol use: Yes     Alcohol/week: 8.0 standard drinks of alcohol     Types: 8 Glasses of wine per week     Comment: socally     Drug use: No       Family History   Problem Relation Name Age of Onset    Hypertension Mother      Cataracts Mother      Blindness Maternal Aunt      Hypertension Maternal Aunt      Cataracts Maternal Aunt      Breast cancer Sister      Cancer Maternal Grandmother       Breast cancer Maternal Grandmother      Leukemia Paternal Aunt         Current Outpatient Medications on File Prior to Visit   Medication Sig Dispense Refill    alendronate (FOSAMAX) 70 MG tablet Take 1 tablet by mouth every 7 days.      aspirin (ECOTRIN) 81 MG EC tablet Take 81 mg by mouth once daily.      diclofenac sodium (VOLTAREN) 1 % Gel Apply 4 g topically 3 (three) times daily as needed.      diphenhydrAMINE-acetaminophen (TYLENOL PM)  mg Tab Take 1 tablet by mouth nightly.      GARLIC EXTRACT ORAL Take 1 capsule by mouth once daily.      LORazepam (ATIVAN) 1 MG tablet Take one tablet by mouth every 6 (six) hours as needed. (Patient taking differently: Take 1 mg by mouth every 6 (six) hours as needed.) 30 tablet 2    metoprolol succinate (TOPROL-XL) 25 MG 24 hr tablet TAKE 1/2 TABLET ONCE DAILY 45 tablet 3    multivitamin (THERAGRAN) per tablet Take 1 tablet by mouth once daily.      simvastatin (ZOCOR) 20 MG tablet Take 1 tablet by mouth once daily.      venlafaxine (EFFEXOR-XR) 150 MG Cp24 Take 150 mg by mouth once daily.      ibandronate (BONIVA) 150 mg tablet take one tablet by mouth every 30 days as directed 3 tablet 3     No current facility-administered medications on file prior to visit.      Wt Readings from Last 3 Encounters:   08/04/25 62 kg (136 lb 11 oz)   07/16/25 56.9 kg (125 lb 7.1 oz)   02/04/25 56.9 kg (125 lb 7.1 oz)     Temp Readings from Last 3 Encounters:   08/23/20 98.7 °F (37.1 °C) (Temporal)   11/26/19 98.4 °F (36.9 °C) (Tympanic)   08/08/19 97.7 °F (36.5 °C)     BP Readings from Last 3 Encounters:   08/04/25 114/60   02/04/25 124/62   01/27/25 118/68     Pulse Readings from Last 3 Encounters:   08/04/25 63   02/04/25 71   09/23/24 69        Review of Systems   Constitutional: Negative.   HENT: Negative.     Eyes: Negative.    Cardiovascular: Negative.    Respiratory: Negative.     Skin: Negative.    Musculoskeletal: Negative.    Gastrointestinal: Negative.    Genitourinary:  Negative.    Neurological: Negative.    Psychiatric/Behavioral: Negative.         Objective:   Physical Exam  Vitals and nursing note reviewed.   Constitutional:       Appearance: Normal appearance.   HENT:      Head: Normocephalic.   Eyes:      Pupils: Pupils are equal, round, and reactive to light.   Cardiovascular:      Rate and Rhythm: Normal rate and regular rhythm.      Heart sounds: Normal heart sounds, S1 normal and S2 normal. No murmur heard.     No S3 or S4 sounds.   Pulmonary:      Effort: Pulmonary effort is normal.      Breath sounds: Normal breath sounds.   Abdominal:      General: Bowel sounds are normal.      Palpations: Abdomen is soft.   Musculoskeletal:         General: Normal range of motion.      Cervical back: Normal range of motion.   Skin:     Capillary Refill: Capillary refill takes less than 2 seconds.   Neurological:      General: No focal deficit present.      Mental Status: She is alert and oriented to person, place, and time.   Psychiatric:         Mood and Affect: Mood normal.         Behavior: Behavior normal.         Thought Content: Thought content normal.         Lab Results   Component Value Date    CHOL 191 06/03/2022    CHOL 192 02/27/2020    CHOL 225 (H) 09/08/2011     Lab Results   Component Value Date    HDL 60 06/03/2022    HDL 60 02/27/2020    HDL 64 09/08/2011     Lab Results   Component Value Date    LDLCALC 112.4 06/03/2022    LDLCALC 113.6 02/27/2020    LDLCALC 149.6 09/08/2011     Lab Results   Component Value Date    TRIG 93 06/03/2022    TRIG 92 02/27/2020    TRIG 57 09/08/2011     Lab Results   Component Value Date    CHOLHDL 31.4 06/03/2022    CHOLHDL 31.3 02/27/2020    CHOLHDL 28.4 09/08/2011       Chemistry        Component Value Date/Time     08/08/2019 1417    K 4.3 08/08/2019 1417     08/08/2019 1417    CO2 27 08/08/2019 1417    BUN 11 08/08/2019 1417    CREATININE 0.7 08/08/2019 1417     08/08/2019 1417        Component Value Date/Time     "CALCIUM 9.8 08/08/2019 1417    ALKPHOS 69 09/08/2011 0848    AST 22 09/08/2011 0848    ALT 23 09/08/2011 0848    BILITOT 0.5 09/08/2011 0848    ESTGFRAFRICA >60.0 08/08/2019 1417    EGFRNONAA >60.0 08/08/2019 1417          Lab Results   Component Value Date    TSH 0.14 (L) 03/15/2024     No results found for: "INR", "PROTIME"  @RESUFAST(WBC,HGB,HCT,MCV,PLT)  @LABRCNTIP(BNP,BNPTRIAGEBLO)@  CrCl cannot be calculated (Patient's most recent lab result is older than the maximum 7 days allowed.).     Results for orders placed during the hospital encounter of 01/27/25    Echo    Interpretation Summary    Left Ventricle: The left ventricle is normal in size. Ventricular mass is normal. Normal wall thickness. There is normal systolic function with a visually estimated ejection fraction of 55 - 60%. There is normal diastolic function.    Right Ventricle: Normal right ventricular cavity size. Wall thickness is normal. Systolic function is normal.    Aortic Valve: There is mild aortic regurgitation.    Mitral Valve: There is mild regurgitation.    Tricuspid Valve: There is mild regurgitation.    Pulmonary Artery: The estimated pulmonary artery systolic pressure is 24 mmHg.    IVC/SVC: Normal venous pressure at 3 mmHg.     Results for orders placed during the hospital encounter of 01/27/25    Exercise Stress - EKG    Interpretation Summary    The ECG portion of the study is negative for ischemia.    The patient reported no chest pain during the stress test.    The exercise capacity was normal.    The patient exercised for 8 minutes 16 seconds on a Samuel protocol, corresponding to a functional capacity of 10 estimated METS, achieving a peak heart rate of 130 bpm, which is 89% of the age predicted maximum heart rate. Their exercise capacity was normal.    Shortness of breath, claudication and flushing resolved prior to the end of the recovery period.     Assessment:      1. Paroxysmal supraventricular tachycardia    2. Pure " hypercholesterolemia          Plan:   Paroxysmal supraventricular tachycardia    Pure hypercholesterolemia        Metoprolol, limit caffeine intake SVT  Simvastatin, low-fat diet-HLP  Risk factor modifications   Low-sodium diet   Daily exercise as tolerated       Return to clinic in 6 months or sooner if needed    Courtney Guillot, FNP-C [facility], Cardiology